# Patient Record
Sex: FEMALE | Race: WHITE | Employment: OTHER | URBAN - METROPOLITAN AREA
[De-identification: names, ages, dates, MRNs, and addresses within clinical notes are randomized per-mention and may not be internally consistent; named-entity substitution may affect disease eponyms.]

---

## 2018-05-15 ENCOUNTER — TELEPHONE (OUTPATIENT)
Dept: INTERNAL MEDICINE CLINIC | Age: 78
End: 2018-05-15

## 2018-05-15 ENCOUNTER — OFFICE VISIT (OUTPATIENT)
Dept: INTERNAL MEDICINE CLINIC | Age: 78
End: 2018-05-15

## 2018-05-15 VITALS
HEART RATE: 76 BPM | BODY MASS INDEX: 28.1 KG/M2 | WEIGHT: 158.6 LBS | RESPIRATION RATE: 12 BRPM | HEIGHT: 63 IN | DIASTOLIC BLOOD PRESSURE: 80 MMHG | TEMPERATURE: 97.3 F | SYSTOLIC BLOOD PRESSURE: 126 MMHG | OXYGEN SATURATION: 96 %

## 2018-05-15 DIAGNOSIS — S46.001S OSTEOARTHRITIS OF RIGHT SHOULDER DUE TO ROTATOR CUFF INJURY: ICD-10-CM

## 2018-05-15 DIAGNOSIS — M54.50 LUMBAR PAIN WITH RADIATION DOWN LEFT LEG: Primary | ICD-10-CM

## 2018-05-15 DIAGNOSIS — M79.605 LUMBAR PAIN WITH RADIATION DOWN LEFT LEG: Primary | ICD-10-CM

## 2018-05-15 DIAGNOSIS — E78.00 HYPERCHOLESTEROLEMIA: ICD-10-CM

## 2018-05-15 DIAGNOSIS — Z78.0 POSTMENOPAUSAL: ICD-10-CM

## 2018-05-15 DIAGNOSIS — M19.111 OSTEOARTHRITIS OF RIGHT SHOULDER DUE TO ROTATOR CUFF INJURY: ICD-10-CM

## 2018-05-15 DIAGNOSIS — F41.9 ANXIETY DISORDER, UNSPECIFIED TYPE: ICD-10-CM

## 2018-05-15 RX ORDER — DESONIDE 0.5 MG/ML
LOTION TOPICAL 2 TIMES DAILY
COMMUNITY

## 2018-05-15 RX ORDER — PREDNISONE 20 MG/1
20 TABLET ORAL
Qty: 7 TAB | Refills: 0 | Status: SHIPPED | OUTPATIENT
Start: 2018-05-15 | End: 2018-09-05

## 2018-05-15 NOTE — MR AVS SNAPSHOT
[address] [facility] 
 
 
 [address], [address] 200 [facility] 
[phone] Patient: Gorge Mendoza MRN: [id number] Visit Information Date & Time Provider Department Dept. Phone Encounter #  
 5/15/2018  8:30 AM Leta Carrera MD [facility] [phone] [id number] Follow-up Instructions Return in about 3 months (around 8/15/2018). Your Appointments 8/8/2018  9:55 AM  
LAB with [facility] NURSE VISIT [facility] ([facility]) Appt Note: lab  
 [address], [address] [address] [address] [place]  
  
   
 [address], [address]  
  
    
 8/15/2018  8:30 AM  
Office Visit with Leta Carrera MD  
[facility]) Appt Note: ov 3mos jeremias  
 [address] [facility], [address] [address] [address] [place]  
  
   
 [address], [address]  
  
    
  
 10/30/2018  1:00 PM  
Any with Chioma Lewis MD  
[facility] ([facility]) Appt Note: Return in about 6 months (around 10/27/2018) for follow up. [address] [facility] [address] [facility]  
  
   
 [address] [facility] [address] [address] Upcoming Health Maintenance Date Due DTaP/Tdap/Td series (1 - Tdap) 5/26/1962 ZOSTER VACCINE AGE 60> 3/26/2001 GLAUCOMA SCREENING Q2Y 5/26/2006 Bone Densitometry (Dexa) Screening 5/26/2006 Pneumococcal 65+ Low/Medium Risk (1 of 2 - PCV13) 5/26/2006 MEDICARE YEARLY EXAM 3/14/2018 Influenza Age 5 to Adult 8/1/2018 Allergies as of 5/15/2018  Review Complete On: 5/2/2018 By: Chioma Lewis MD  
  
 Severity Noted Reaction Type Reactions Hydrocodone Bitartrate High 07/17/2015    Nausea Only Ibuprofen High 07/17/2015    Other (comments) Platelets drop Iodinated Contrast- Oral And Iv Dye High 07/17/2015    Other (comments) Iodine  03/02/2018    Swelling Current Immunizations  Never Reviewed No immunizations on file. Not reviewed this visit You Were Diagnosed With   
  
 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 [phone] Your Updated Medication List  
  
   
This list is accurate as of 5/15/18 10:49 AM.  Always use your most recent med list.  
  
  
  
  
 ALIVE WOMEN'S GUMMY VITAMINS PO Take  by mouth.  
  
 busPIRone 10 mg tablet Commonly known as:  BUSPAR  
  
 celecoxib 200 mg capsule Commonly known as:  CELEBREX Take 200 mg by mouth two (2) times a day. clobetasol 0.05 % external solution Commonly known as:  TEMOVATE  
  
 cyclobenzaprine 5 mg tablet Commonly known as:  FLEXERIL  
CYCLOBENZAPRINE HCL 10MG, 1 (ONE) TABLET TABLET EVERY SIX HOURS, AS NEEDED # 90, 03/17/2015, REF. X3. ACTIVE  
  
 desonide 0.05 % topical lotion Commonly known as:  Dorthula Newcomer Apply  to affected area two (2) times a day. diclofenac 1 % Gel Commonly known as:  VOLTAREN FIBER SUPPLEMENT PO Take  by mouth.  
  
 gabapentin 300 mg capsule Commonly known as:  NEURONTIN Take 300 mg by mouth three (3) times daily. hydroCHLOROthiazide 25 mg tablet Commonly known as:  HYDRODIURIL Take 25 mg by mouth daily. ketoconazole 2 % shampoo Commonly known as:  NIZORAL  
  
 nystatin-triamcinolone 100,000-0.1 unit/gram-% ointment Commonly known as:  MYCOLOG  
nystatin-triamcinolone 100,000 unit/gram-0.1 % topical ointment  
  
 predniSONE 20 mg tablet Commonly known as:  Leverne Lawrence Take 1 Tab by mouth daily (with breakfast). raloxifene 60 mg tablet Commonly known as:  EVISTA  
  
 simvastatin 20 mg tablet Commonly known as:  ZOCOR  
TAKE 1 TABLET BY MOUTH EVERY NIGHT AT BEDTIME. tacrolimus 0.1 % ointment Commonly known as:  PROTOPIC  
tacrolimus 0.1 % topical ointment  
  
 venlafaxine-SR 75 mg capsule Commonly known as:  EFFEXOR-XR Take 75 mg by mouth daily. VITAMIN D2 50,000 unit capsule Generic drug:  ergocalciferol TAKE 1 CAPSULE BY MOUTH EVERY THURSDAY. Prescriptions Sent to Pharmacy Refills  
 predniSONE (DELTASONE) 20 mg tablet 0 Sig: Take 1 Tab by mouth daily (with breakfast). Class: Normal  
 Pharmacy: [facility] [address] [place] [address],[address], [address]  #: [phone] Route: Oral  
  
Follow-up Instructions Return in about 3 months (around 8/15/2018). Patient Instructions Advance Directives: Care Instructions Your Care Instructions An advance directive is a legal way to state your wishes at the end of your life. It tells your family and your doctor what to do if you can no longer say what you want. There are two main types of advance directives. You can change them any time that your wishes change. · A living will tells your family and your doctor your wishes about life support and other treatment. · A durable power of  for health care lets you name a person to make treatment decisions for you when you can't speak for yourself. This person is called a health care agent. If you do not have an advance directive, decisions about your medical care may be made by a doctor or a  who doesn't know you. It may help to think of an advance directive as a gift to the people who care for you. If you have one, they won't have to make tough decisions by themselves. Follow-up care is a key part of your treatment and safety. Be sure to make and go to all appointments, and call your doctor if you are having problems. It's also a good idea to know your test results and keep a list of the medicines you take. How can you care for yourself at home? · Discuss your wishes with your loved ones and your doctor. This way, there are no surprises. · Many states have a unique form. Or you might use a universal form that has been approved by many states. This kind of form can sometimes be completed and stored online. Your electronic copy will then be available wherever you have a connection to the Internet. In most cases, doctors will respect your wishes even if you have a form from a different state. · You don't need a  to do an advance directive. But you may want to get legal advice. · Think about these questions when you prepare an advance directive: ¨ Who do you want to make decisions about your medical care if you are not able to? Many people choose a family member or close friend. ¨ Do you know enough about life support methods that might be used? If not, talk to your doctor so you understand. ¨ What are you most afraid of that might happen? You might be afraid of having pain, losing your independence, or being kept alive by machines. ¨ Where would you prefer to die? Choices include your home, a hospital, or a nursing home. ¨ Would you like to have information about hospice care to support you and your family? ¨ Do you want to donate organs when you die? ¨ Do you want certain Taoism practices performed before you die? If so, put your wishes in the advance directive. · Read your advance directive every year, and make changes as needed. When should you call for help? Be sure to contact your doctor if you have any questions. Where can you learn more? Go to http://robbin-duglas.info/. Enter R264 in the search box to learn more about \"Advance Directives: Care Instructions. \" Current as of: September 24, 2016 Content Version: 11.4 © 2549-3128 [employer]. Care instructions adapted under license by [employer] (which disclaims liability or warranty for this information). If you have questions about a medical condition or this instruction, always ask your healthcare professional. [address] any warranty or liability for your use of this information. Introducing [facility] & HEALTH SERVICES! Dear Princess Matta: Thank you for requesting a Connolly account. Our records indicate that you already have an active Connolly account. You can access your account anytime at [email or web address] Did you know that you can access your hospital and ER discharge instructions at any time in Connolly? You can also review all of your test results from your hospital stay or ER visit. Additional Information If you have questions, please visit the Frequently Asked Questions section of the Connolly website at [email or web address] Remember, Connolly is NOT to be used for urgent needs. For medical emergencies, dial 911. Now available from your iPhone and Android! Please provide this summary of care documentation to your next provider. Your primary care clinician is listed as Khoa Sewell. If you have any questions after today's visit, please call [phone].

## 2018-05-15 NOTE — PROGRESS NOTES
1. Have you been to the ER, urgent care clinic or hospitalized since your last visit? NO.     2. Have you seen or consulted any other health care providers outside of the [address] [place] [address] since your last visit (Include any pap smears or colon screening)? YES  Dr Rogelio Nice ortho, Dr Hernández July [facility], spine doctor Dr Andrzej Mariscal, Dr Neo Sethi Neurology for spine issues, Dr Nikia Hernandez, she will be having a CT scan on 5/23 for feelings of fullness even though she has not ate anything, Dr Ileana Mathews Rheumatology     Do you have an Advanced Directive? NO    Would you like information on Advanced Directives?  Yes

## 2018-05-15 NOTE — PATIENT INSTRUCTIONS
Advance Directives: Care Instructions  Your Care Instructions  An advance directive is a legal way to state your wishes at the end of your life. It tells your family and your doctor what to do if you can no longer say what you want. There are two main types of advance directives. You can change them any time that your wishes change. · A living will tells your family and your doctor your wishes about life support and other treatment. · A durable power of  for health care lets you name a person to make treatment decisions for you when you can't speak for yourself. This person is called a health care agent. If you do not have an advance directive, decisions about your medical care may be made by a doctor or a  who doesn't know you. It may help to think of an advance directive as a gift to the people who care for you. If you have one, they won't have to make tough decisions by themselves. Follow-up care is a key part of your treatment and safety. Be sure to make and go to all appointments, and call your doctor if you are having problems. It's also a good idea to know your test results and keep a list of the medicines you take. How can you care for yourself at home? · Discuss your wishes with your loved ones and your doctor. This way, there are no surprises. · Many states have a unique form. Or you might use a universal form that has been approved by many states. This kind of form can sometimes be completed and stored online. Your electronic copy will then be available wherever you have a connection to the Internet. In most cases, doctors will respect your wishes even if you have a form from a different state. · You don't need a  to do an advance directive. But you may want to get legal advice. · Think about these questions when you prepare an advance directive:  ¨ Who do you want to make decisions about your medical care if you are not able to?  Many people choose a family member or close friend. ¨ Do you know enough about life support methods that might be used? If not, talk to your doctor so you understand. ¨ What are you most afraid of that might happen? You might be afraid of having pain, losing your independence, or being kept alive by machines. ¨ Where would you prefer to die? Choices include your home, a hospital, or a nursing home. ¨ Would you like to have information about hospice care to support you and your family? ¨ Do you want to donate organs when you die? ¨ Do you want certain Taoism practices performed before you die? If so, put your wishes in the advance directive. · Read your advance directive every year, and make changes as needed. When should you call for help? Be sure to contact your doctor if you have any questions. Where can you learn more? Go to http://robbin-duglas.info/. Enter R264 in the search box to learn more about \"Advance Directives: Care Instructions. \"  Current as of: September 24, 2016  Content Version: 11.4  © 3002-1455 [employer]wise[employer]orporated. Care instructions adapted under license by [employer] (which disclaims liability or warranty for this information). If you have questions about a medical condition or this instruction, always ask your healthcare professional. [address] any warranty or liability for your use of this information.

## 2018-05-16 PROBLEM — N32.81 OVERACTIVE BLADDER: Status: ACTIVE | Noted: 2018-05-16

## 2018-05-16 PROBLEM — Z87.440 HISTORY OF RECURRENT UTIS: Status: ACTIVE | Noted: 2018-05-16

## 2018-05-16 NOTE — PROGRESS NOTES
JUSTEN Allison is a 69 y.o. female with relevant past medical history of chronic pain, arthritis, hyperlipidemia, HTN, recurrent UTIs, OAB, who presents today to establish medical care. Patient's PCP is retiring. Overall the patient feels well, except for chronic low back pain and R shoulder pain secondary to rotator cuff injury. Patient is following with ortho and is undergoing work up. She has had corticosteroid injection to her R shoulder, once with great control of her pain, but a second injection did not have the same response. She is not interested much in doing PT. She is right handed. Denies any h/o trauma or surgeries. She tells me her back pain radiates to her left lower extremity, with shooting pain, severe. Denies any motor or sensory deficits. Has an MRI pending for evaluation of this pain. Urology notes from 3/2/18 noted. OAB, life style changes recommended before medication trial. Recurrent UTIs (3 in the past year), Caesar Ek on UC from 12/2017. UTI prevention discussed, cranberry recommended. ROS  As above included in HPI. Otherwise 11 point review of systems negative including constitutional, skin, HENT, eyes, respiratory, cardiovascular, gastrointestinal, genitourinary, musculoskeletal, endocrine, hematologic, allergy, and neurologic. Past Medical History  Past Medical History:   Diagnosis Date    Arthritis     Breast tumor     Chronic pain     Hypercholesterolemia      History reviewed. No pertinent surgical history. Family History  Family History   Problem Relation Age of Onset    Hypertension Mother     Diabetes Mother     Stroke Father     Heart Disease Brother        Social History  She  reports that she has never smoked. She has never used smokeless tobacco.   History   Alcohol Use    Yes     Comment: socially       Immunization History    There is no immunization history on file for this patient.     Allergies  Allergies   Allergen Reactions    Hydrocodone Bitartrate Nausea Only    Ibuprofen Other (comments)     Platelets drop     Iodinated Contrast- Oral And Iv Dye Other (comments)    Iodine Swelling       Medications  Current Outpatient Prescriptions   Medication Sig    desonide (TRIDESILON) 0.05 % topical lotion Apply  to affected area two (2) times a day.  psyllium seed, with sugar, (FIBER SUPPLEMENT PO) Take  by mouth.  multivit-min/folic [id number] (ALIVE WOMEN'S GUMMY VITAMINS PO) Take  by mouth.  predniSONE (DELTASONE) 20 mg tablet Take 1 Tab by mouth daily (with breakfast).  clobetasol (TEMOVATE) 0.05 % external solution     cyclobenzaprine (FLEXERIL) 5 mg tablet CYCLOBENZAPRINE HCL 10MG, 1 (ONE) TABLET TABLET EVERY SIX HOURS, AS NEEDED # 90, 03/17/2015, REF. X3. ACTIVE    diclofenac (VOLTAREN) 1 % gel     nystatin-triamcinolone (MYCOLOG) 100,000-0.1 unit/gram-% ointment nystatin-triamcinolone 100,000 unit/gram-0.1 % topical ointment    busPIRone (BUSPAR) 10 mg tablet     celecoxib (CELEBREX) 200 mg capsule Take 200 mg by mouth two (2) times a day.  VITAMIN D2 50,000 unit capsule TAKE 1 CAPSULE BY MOUTH EVERY THURSDAY.  gabapentin (NEURONTIN) 300 mg capsule Take 300 mg by mouth three (3) times daily.  hydroCHLOROthiazide (HYDRODIURIL) 25 mg tablet Take 25 mg by mouth daily.  ketoconazole (NIZORAL) 2 % shampoo     raloxifene (EVISTA) 60 mg tablet     simvastatin (ZOCOR) 20 mg tablet TAKE 1 TABLET BY MOUTH EVERY NIGHT AT BEDTIME.  venlafaxine-SR (EFFEXOR-XR) 75 mg capsule Take 75 mg by mouth daily.  tacrolimus (PROTOPIC) 0.1 % ointment tacrolimus 0.1 % topical ointment     No current facility-administered medications for this visit. Visit Vitals    /80 (BP 1 Location: Left arm, BP Patient Position: Sitting)    Pulse 76    Temp 97.3 °F (36.3 °C) (Oral)    Resp 12    Ht 5' 3\" (1.6 m)    Wt 158 lb 9.6 oz (71.9 kg)    SpO2 96%    BMI 28.09 kg/m2     Body mass index is 28.09 kg/(m^2).     Physical Exam Constitutional: She is oriented to person, place, and time and well-developed, well-nourished, and in no distress. HENT:   Head: Normocephalic and atraumatic. Right Ear: External ear normal.   Left Ear: External ear normal.   Nose: Nose normal.   Mouth/Throat: Oropharynx is clear and moist.   Eyes: EOM are normal. Pupils are equal, round, and reactive to light. Neck: Normal range of motion. Neck supple. Cardiovascular: Normal rate, regular rhythm, normal heart sounds and intact distal pulses. Pulmonary/Chest: Effort normal and breath sounds normal. No respiratory distress. Abdominal: Soft. Bowel sounds are normal.   Musculoskeletal: Normal range of motion. She exhibits no edema. Right shoulder pain with internal rotation and abduction. Full ROM. Neurological: She is alert and oriented to person, place, and time. Skin: Skin is warm. No rash noted. No erythema. No pallor. Psychiatric: Mood and affect normal.   Nursing note and vitals reviewed.         REVIEW OF DATA    Labs  Office Visit on 04/27/2018   Component Date Value Ref Range Status    Color (UA POC) 04/27/2018 Yellow   Final    Clarity (UA POC) 04/27/2018 Clear   Final    Glucose (UA POC) 04/27/2018 Negative  Negative Final    Bilirubin (UA POC) 04/27/2018 Negative  Negative Final    Ketones (UA POC) 04/27/2018 Negative  Negative Final    Specific gravity (UA POC) 04/27/2018 1.010  1.001 - 1.035 Final    Blood (UA POC) 04/27/2018 Trace  Negative Final    pH (UA POC) 04/27/2018 7.0  4.6 - 8.0 Final    Protein (UA POC) 04/27/2018 Negative  Negative Final    Urobilinogen (UA POC) 04/27/2018 0.2 mg/dL  0.2 - 1 Final    Nitrites (UA POC) 04/27/2018 Negative  Negative Final    Leukocyte esterase (UA POC) 04/27/2018 Negative  Negative Final         CT Results (most recent):    Results from Hospital Encounter encounter on 11/30/15   CT CHEST WO CONT   Narrative EXAMINATION: CT chest without contrast.    HISTORY:  Thoracic spine pain. Right chest and rib pain 9-10 weeks. No history  of trauma. TECHNIQUE:  Multiple contiguous axial CT images at 5 mm increments were obtained from the  apex of the lungs to the  domes of the diaphragm without intravenous contrast.    FINDINGS:    Minimal bibasilar discoid atelectasis versus scarring. No lung masses. Pretracheal lymph node 12 x 18 mm. Several smaller right paratracheal lymph  nodes. Small aortic pulmonic lymph nodes. No gross hilar lymph node enlargement. Mild degenerative spurring minimal thoracic levels. No compression deformities. No blastic or lytic lesions in the thoracic spine. No discrete rib lesions  identified. No evidence of healing rib fracture. IMPRESSION:  1. Nonspecific prominent pretracheal lymph nodes likely reactive. Recommend  followup in 6 months with intravenous contrast to ensure stability. 2.  Mild degenerative changes of the thoracic spine without evidence of acute  compression fracture or lesion. 3.  No discrete rib lesions identified. ST/acw    Electronically signed by: Vivek Jacques Date:  11/30/2015 21:11         XR Results (most recent):    Results from [place] encounter on 02/16/16   XR RIBS UNILATERAL   Narrative HISTORY: Right rib pain. IMPRESSION: Three views of the right ribs reveal no rib fracture, hemothorax, or  pneumothorax. VDL/pab    Electronically signed by: Le Lockwood Date:  02/16/2016 14:43       CT   All Micro Results     None             HCM  I will obtain records from previous PCP Dr. Joce Hernandez. Per patient up to date. DIAGNOSIS AND PLAN  Patient Active Problem List   Diagnosis Code    Breast tumor D49.3    Chronic pain G89.29    Hypercholesterolemia E78.00    Arthritis M19.90    History of recurrent UTIs Z87.440    Overactive bladder N32.81     1. Lumbar pain with radiation down left leg  - Pending MRI for evaluation.   - Following with ortho.   - Pain responds to gabapentin partiallly and celecoxib  - Trial of prednisone for radiculopathic symptoms while awaiting assessment and management recommendations by ortho after imaging study. 2. 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  - Following with ortho. - May benefit from intra-articular corticosteroid injections (done in the past with variable response) and PT. 3. Hypercholesterolemia/HTN  - Controlled with simvastatin. Patient had lipid panel done by prior PCP ([address]) 4/2018.   - Controlled with HCTZ, patient reports use for diuresis of LE edema occasionally. 4. Anxiety disorder, postmenopausal status  Has been taking venlafaxine for hot flashes and buspirone for anxiety, prescribed by GYN, recently seen by them. Up to date with pap smear and mammograms (records requested). Follow-up Disposition:  Return in about 3 months (around 8/15/2018). Elen Macario Mt, MD

## 2018-07-16 ENCOUNTER — OFFICE VISIT (OUTPATIENT)
Dept: INTERNAL MEDICINE CLINIC | Age: 78
End: 2018-07-16

## 2018-07-16 VITALS
HEART RATE: 95 BPM | DIASTOLIC BLOOD PRESSURE: 60 MMHG | HEIGHT: 63 IN | TEMPERATURE: 98.4 F | BODY MASS INDEX: 28.53 KG/M2 | SYSTOLIC BLOOD PRESSURE: 104 MMHG | OXYGEN SATURATION: 98 % | RESPIRATION RATE: 14 BRPM | WEIGHT: 161 LBS

## 2018-07-16 DIAGNOSIS — M79.605 LUMBAR PAIN WITH RADIATION DOWN LEFT LEG: ICD-10-CM

## 2018-07-16 DIAGNOSIS — Z01.818 PREOPERATIVE CLEARANCE: Primary | ICD-10-CM

## 2018-07-16 DIAGNOSIS — S46.001S OSTEOARTHRITIS OF RIGHT SHOULDER DUE TO ROTATOR CUFF INJURY: ICD-10-CM

## 2018-07-16 DIAGNOSIS — M19.111 OSTEOARTHRITIS OF RIGHT SHOULDER DUE TO ROTATOR CUFF INJURY: ICD-10-CM

## 2018-07-16 DIAGNOSIS — M54.50 LUMBAR PAIN WITH RADIATION DOWN LEFT LEG: ICD-10-CM

## 2018-07-16 RX ORDER — DESONIDE 0.5 MG/ML
LOTION TOPICAL 2 TIMES DAILY
Qty: 59 ML | Status: CANCELLED | OUTPATIENT
Start: 2018-07-16

## 2018-07-16 RX ORDER — CYCLOBENZAPRINE HCL 5 MG
TABLET ORAL
Qty: 60 TAB | Refills: 2 | Status: SHIPPED | OUTPATIENT
Start: 2018-07-16 | End: 2018-08-15 | Stop reason: SDUPTHER

## 2018-07-16 RX ORDER — DICLOFENAC SODIUM 10 MG/G
GEL TOPICAL 4 TIMES DAILY
Qty: 100 G | Refills: 1 | Status: SHIPPED | OUTPATIENT
Start: 2018-07-16

## 2018-07-16 NOTE — PROGRESS NOTES
Chief Complaint   Patient presents with    Pre-op Exam     Patient present for a medical clearance for right shoulder surgery to be performed by Dr. Sven Orozco on July 20, 2018 at [facility]. Patient states that her EKG and blood work was completed before this visit. Health Maintenance Due   Topic Date Due    DTaP/Tdap/Td series (1 - Tdap) 05/26/1962    ZOSTER VACCINE AGE 60>  03/26/2001    GLAUCOMA SCREENING Q2Y  05/26/2006    Bone Densitometry (Dexa) Screening  05/26/2006    Pneumococcal 65+ Low/Medium Risk (1 of 2 - PCV13) 05/26/2006    MEDICARE YEARLY EXAM  03/14/2018     1. Have you been to the ER, urgent care clinic or hospitalized since your last visit? NO.     2. Have you seen or consulted any other health care providers outside of the [address] [place] [address] since your last visit (Include any pap smears or colon screening)? YES, Last seen on July 2, 2018 with Dr. Irving Lake from [facility]. Do you have an Advanced Directive? YES, Patient states she has it somewhere in her home, but her  knows what she wants.

## 2018-07-16 NOTE — PROGRESS NOTES
HPI     Yarelis Gutierres is a 69 y.o. female with relevant past medical history of chronic pain, arthritis, hyperlipidemia, HTN, recurrent UTIs, OAB, who presents today to preop clearance for R shoulder rotator cuff repair, by Dr. Adonay Ferrer on 7/20/18 with general anesthesia. Overall the patient feels well, except for chronic low back pain and R shoulder pain secondary to rotator cuff injury. Denies any h/o trauma or surgeries. Reports shoulder pain is moderate to severe, worse with movement. Patient denies any chest pain, palpitations, leg swelling, dizziness, headaches, nausea, vomiting, diarrhea, abdominal pain, urinary symptoms including dysuria, urinary frequency, or hematuria. ROS  As above included in HPI. Otherwise 11 point review of systems negative including constitutional, skin, HENT, eyes, respiratory, cardiovascular, gastrointestinal, genitourinary, musculoskeletal, endocrine, hematologic, allergy, and neurologic. Past Medical History  Past Medical History:   Diagnosis Date    Arthritis     Breast tumor     Chronic pain     Hypercholesterolemia      History reviewed. No pertinent surgical history. Family History  Family History   Problem Relation Age of Onset    Hypertension Mother     Diabetes Mother     Stroke Father     Heart Disease Brother        Social History  She  reports that she has quit smoking. She has never used smokeless tobacco.   History   Alcohol Use    0.6 oz/week    1 Glasses of wine per week     Comment: socially       Immunization History    There is no immunization history on file for this patient.     Allergies  Allergies   Allergen Reactions    Hydrocodone Bitartrate Nausea Only    Ibuprofen Other (comments)     Platelets drop     Iodinated Contrast- Oral And Iv Dye Other (comments)    Iodine Swelling       Medications  Current Outpatient Prescriptions   Medication Sig    desonide (TRIDESILON) 0.05 % topical lotion Apply  to affected area two (2) times a day.    psyllium seed, with sugar, (FIBER SUPPLEMENT PO) Take  by mouth.  clobetasol (TEMOVATE) 0.05 % external solution     diclofenac (VOLTAREN) 1 % gel     nystatin-triamcinolone (MYCOLOG) 100,000-0.1 unit/gram-% ointment nystatin-triamcinolone 100,000 unit/gram-0.1 % topical ointment    busPIRone (BUSPAR) 10 mg tablet     celecoxib (CELEBREX) 200 mg capsule Take 200 mg by mouth two (2) times a day.  VITAMIN D2 50,000 unit capsule TAKE 1 CAPSULE BY MOUTH EVERY THURSDAY.  gabapentin (NEURONTIN) 300 mg capsule Take 300 mg by mouth four (4) times daily.  hydroCHLOROthiazide (HYDRODIURIL) 25 mg tablet Take 25 mg by mouth daily.  ketoconazole (NIZORAL) 2 % shampoo     raloxifene (EVISTA) 60 mg tablet     simvastatin (ZOCOR) 20 mg tablet TAKE 1 TABLET BY MOUTH EVERY NIGHT AT BEDTIME.  predniSONE (DELTASONE) 20 mg tablet Take 1 Tab by mouth daily (with breakfast).  cyclobenzaprine (FLEXERIL) 5 mg tablet CYCLOBENZAPRINE HCL 10MG, 1 (ONE) TABLET TABLET EVERY SIX HOURS, AS NEEDED # 90, 03/17/2015, REF. X3. ACTIVE    tacrolimus (PROTOPIC) 0.1 % ointment tacrolimus 0.1 % topical ointment    venlafaxine-SR (EFFEXOR-XR) 75 mg capsule Take 75 mg by mouth daily. No current facility-administered medications for this visit. Visit Vitals    /60 (BP 1 Location: Left arm, BP Patient Position: Sitting)    Pulse 95    Temp 98.4 °F (36.9 °C) (Oral)    Resp 14    Ht 5' 3\" (1.6 m)    Wt 161 lb (73 kg)    SpO2 98%    BMI 28.52 kg/m2     Body mass index is 28.52 kg/(m^2). Physical Exam   Constitutional: She is oriented to person, place, and time and well-developed, well-nourished, and in no distress. HENT:   Head: Normocephalic and atraumatic. Right Ear: External ear normal.   Left Ear: External ear normal.   Nose: Nose normal.   Mouth/Throat: Oropharynx is clear and moist.   Eyes: EOM are normal. Pupils are equal, round, and reactive to light. Neck: Normal range of motion. Neck supple. Cardiovascular: Normal rate, regular rhythm, normal heart sounds and intact distal pulses. Pulmonary/Chest: Effort normal and breath sounds normal. No respiratory distress. Abdominal: Soft. Bowel sounds are normal.   Musculoskeletal: Normal range of motion. She exhibits no edema. Right shoulder pain with internal rotation and abduction. Full ROM. Neurological: She is alert and oriented to person, place, and time. Skin: Skin is warm. No rash noted. No erythema. No pallor. Psychiatric: Mood and affect normal.   Nursing note and vitals reviewed. REVIEW OF DATA    Labs  No visits with results within 1 Month(s) from this visit. Latest known visit with results is:    Office Visit on 04/27/2018   Component Date Value Ref Range Status    Color (UA POC) 04/27/2018 Yellow   Final    Clarity (UA POC) 04/27/2018 Clear   Final    Glucose (UA POC) 04/27/2018 Negative  Negative Final    Bilirubin (UA POC) 04/27/2018 Negative  Negative Final    Ketones (UA POC) 04/27/2018 Negative  Negative Final    Specific gravity (UA POC) 04/27/2018 1.010  1.001 - 1.035 Final    Blood (UA POC) 04/27/2018 Trace  Negative Final    pH (UA POC) 04/27/2018 7.0  4.6 - 8.0 Final    Protein (UA POC) 04/27/2018 Negative  Negative Final    Urobilinogen (UA POC) 04/27/2018 0.2 mg/dL  0.2 - 1 Final    Nitrites (UA POC) 04/27/2018 Negative  Negative Final    Leukocyte esterase (UA POC) 04/27/2018 Negative  Negative Final         CT Results (most recent):    Results from Hospital Encounter encounter on 11/30/15   CT CHEST WO CONT   Narrative EXAMINATION: CT chest without contrast.    HISTORY:  Thoracic spine pain. Right chest and rib pain 9-10 weeks. No history  of trauma.     TECHNIQUE:  Multiple contiguous axial CT images at 5 mm increments were obtained from the  apex of the lungs to the  domes of the diaphragm without intravenous contrast.    FINDINGS:    Minimal bibasilar discoid atelectasis versus scarring. No lung masses. Pretracheal lymph node 12 x 18 mm. Several smaller right paratracheal lymph  nodes. Small aortic pulmonic lymph nodes. No gross hilar lymph node enlargement. Mild degenerative spurring minimal thoracic levels. No compression deformities. No blastic or lytic lesions in the thoracic spine. No discrete rib lesions  identified. No evidence of healing rib fracture. IMPRESSION:  1. Nonspecific prominent pretracheal lymph nodes likely reactive. Recommend  followup in 6 months with intravenous contrast to ensure stability. 2.  Mild degenerative changes of the thoracic spine without evidence of acute  compression fracture or lesion. 3.  No discrete rib lesions identified. ST/acw    Electronically signed by: Audrey Lyon Date:  11/30/2015 21:11         XR Results (most recent):    Results from [place] encounter on 02/16/16   XR RIBS UNILATERAL   Narrative HISTORY: Right rib pain. IMPRESSION: Three views of the right ribs reveal no rib fracture, hemothorax, or  pneumothorax. VDL/pab    Electronically signed by: Kyree Weaver Date:  02/16/2016 14:43       CT   All Micro Results     None             HCM  I will obtain records from previous PCP Dr. Lion Glasgow. Per patient up to date. DIAGNOSIS AND PLAN  Patient Active Problem List   Diagnosis Code    Breast tumor D49.3    Chronic pain G89.29    Hypercholesterolemia E78.00    Arthritis M19.90    History of recurrent UTIs Z87.440    Overactive bladder N32.81     1. Lumbar pain with radiation down left leg  - Following with ortho. - Pain responds to gabapentin partiallly and celecoxib  - Trial of prednisone for radiculopathic symptoms  Helped according to patient    2. 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  Surgical Preoperative Clearance  -Patient here for preop clearance, according to patient planning to do a rotator cuff surgical repair, by Dr. Sunitha Lucas on July 20, 2018, with general anesthesia moderate risk procedure, and a low risk patient, hemodynamically stable and with no symptoms or signs of cardiovascular decompensation at this time. Labs and EKG done on July 12, 2018 reviewed, EKG unremarkable, CBC, basic metabolic panel and coagulation factors all within normal range. The patient cleared for the planned right shoulder rotator cuff repair. I will try to obtain records from most recent surgical evaluation including surgical plan, and follow-up with patient after procedure. Follow-up Disposition: Not on File     Elen Pendleton MD

## 2018-07-16 NOTE — Clinical Note
Could you please fax my notes along with the patient's labs and EKG for preop clearance with Dr. Daniel Marcus, ortho, [address] and request from them their last notes on the patient with their surgical plan.  Thanks

## 2018-07-16 NOTE — MR AVS SNAPSHOT
Nahum Ego 
 
 
 [address], [facility] [address] [facility] 
[phone] Patient: Alin Grant MRN: [id number] DPE:[id number]/[id number] Visit Information Date & Time Provider Department Dept. Phone Encounter #  
 7/16/2018  2:30 PM Donna Fleming MD [facility] [address] [place] [address] [phone] [id number] Your Appointments 8/8/2018  9:55 AM  
LAB with IOC NURSE VISIT [facility] [address] [place] [address] ([address]) Appt Note: lab  
 [address], [address] [place] [address] [place]  
  
   
 [address], [address]  
  
    
 8/15/2018 11:30 AM  
Office Visit with Donna Fleming MD  
[facility] [address] [place] [address] [address] [place] [address]) Appt Note: ov 3mos jeremias; ov 3mos jeremias  
 [address] [facility] [address] [address] [place]  
  
   
 [address], [address]  
  
    
  
 10/30/2018  1:00 PM  
Any with Cathleen Siddiqi MD  
[facility] ([address] [place] [address]) Appt Note: Return in about 6 months (around 10/27/2018) for follow up. [address] [facility] [address] [address] [address]  
  
   
 [address] [facility] 2301 [facility] 100 200 [facility] Upcoming Health Maintenance Date Due DTaP/Tdap/Td series (1 - Tdap) 5/26/1962 ZOSTER VACCINE AGE 60> 3/26/2001 GLAUCOMA SCREENING Q2Y 5/26/2006 Bone Densitometry (Dexa) Screening 5/26/2006 Pneumococcal 65+ Low/Medium Risk (1 of 2 - PCV13) 5/26/2006 MEDICARE YEARLY EXAM 3/14/2018 Influenza Age 5 to Adult 8/1/2018 Allergies as of 7/16/2018  Review Complete On: 7/16/2018 By: Kerry Jerez Severity Noted Reaction Type Reactions Hydrocodone Bitartrate High 07/17/2015    Nausea Only Ibuprofen High 07/17/2015    Other (comments) Platelets drop Iodinated Contrast- Oral And Iv Dye High 07/17/2015    Other (comments) Iodine  03/02/2018    Swelling Current Immunizations  Never Reviewed No immunizations on file. Not reviewed this visit You Were Diagnosed With   
  
 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 [phone] Your Updated Medication List  
  
   
This list is accurate as of 7/16/18  3:37 PM.  Always use your most recent med list.  
  
  
  
  
 busPIRone 10 mg tablet Commonly known as:  BUSPAR  
  
 celecoxib 200 mg capsule Commonly known as:  CELEBREX Take 200 mg by mouth two (2) times a day. clobetasol 0.05 % external solution Commonly known as:  TEMOVATE  
  
 cyclobenzaprine 5 mg tablet Commonly known as:  FLEXERIL  
CYCLOBENZAPRINE HCL 10MG, 1 (ONE) TABLET TABLET EVERY SIX HOURS, AS NEEDED # 90, 03/17/2015, REF. X3. ACTIVE  
  
 desonide 0.05 % topical lotion Commonly known as:  Neita Sieve Apply  to affected area two (2) times a day. diclofenac 1 % Gel Commonly known as:  VOLTAREN FIBER SUPPLEMENT PO Take  by mouth.  
  
 gabapentin 300 mg capsule Commonly known as:  NEURONTIN Take 300 mg by mouth four (4) times daily. hydroCHLOROthiazide 25 mg tablet Commonly known as:  HYDRODIURIL  
 Take 25 mg by mouth daily. ketoconazole 2 % shampoo Commonly known as:  NIZORAL  
  
 nystatin-triamcinolone 100,000-0.1 unit/gram-% ointment Commonly known as:  MYCOLOG  
nystatin-triamcinolone 100,000 unit/gram-0.1 % topical ointment  
  
 predniSONE 20 mg tablet Commonly known as:  Nanine Knife Take 1 Tab by mouth daily (with breakfast). raloxifene 60 mg tablet Commonly known as:  EVISTA  
  
 simvastatin 20 mg tablet Commonly known as:  ZOCOR  
TAKE 1 TABLET BY MOUTH EVERY NIGHT AT BEDTIME. tacrolimus 0.1 % ointment Commonly known as:  PROTOPIC  
tacrolimus 0.1 % topical ointment  
  
 venlafaxine-SR 75 mg capsule Commonly known as:  EFFEXOR-XR Take 75 mg by mouth daily. VITAMIN D2 50,000 unit capsule Generic drug:  ergocalciferol TAKE 1 CAPSULE BY MOUTH EVERY THURSDAY. Introducing [facility]! Dear Jayne Lal: Thank you for requesting a at[employer] account. Our records indicate that you already have an active atVenu account. You can access your account anytime at [email or web address] Did you know that you can access your hospital and ER discharge instructions at any time in atVenu? You can also review all of your test results from your hospital stay or ER visit. Additional Information If you have questions, please visit the Frequently Asked Questions section of the atVenu website at [email or web address] Remember, atVenu is NOT to be used for urgent needs. For medical emergencies, dial 911. Now available from your iPhone and Android! Please provide this summary of care documentation to your next provider. Your primary care clinician is listed as Rocio Ling. If you have any questions after today's visit, please call [phone].

## 2018-07-17 ENCOUNTER — DOCUMENTATION ONLY (OUTPATIENT)
Dept: INTERNAL MEDICINE CLINIC | Age: 78
End: 2018-07-17

## 2018-07-17 ENCOUNTER — TELEPHONE (OUTPATIENT)
Dept: INTERNAL MEDICINE CLINIC | Age: 78
End: 2018-07-17

## 2018-07-17 NOTE — PROGRESS NOTES
Med clearance note, labs & ekg results (printed out by Dr Pietro Oreilly) were faxed to Dr Merlinda Hastings at [facility] at [phone].

## 2018-07-30 RX ORDER — SIMVASTATIN 20 MG/1
TABLET, FILM COATED ORAL
Qty: 90 TAB | Refills: 3 | Status: SHIPPED | OUTPATIENT
Start: 2018-07-30 | End: 2019-04-29 | Stop reason: SDUPTHER

## 2018-07-30 NOTE — TELEPHONE ENCOUNTER
Pt stated she was getting rx zocor from Dr. Dori Doherty, she is no longer being seen by her and req a refill on it,pls [facility] to [facility] on [facility] CHERELLE

## 2018-08-15 ENCOUNTER — HOSPITAL ENCOUNTER (OUTPATIENT)
Dept: LAB | Age: 78
Discharge: HOME | End: 2018-08-15
Payer: MEDICARE

## 2018-08-15 ENCOUNTER — OFFICE VISIT (OUTPATIENT)
Dept: INTERNAL MEDICINE CLINIC | Age: 78
End: 2018-08-15

## 2018-08-15 VITALS
RESPIRATION RATE: 18 BRPM | HEART RATE: 87 BPM | BODY MASS INDEX: 28.53 KG/M2 | SYSTOLIC BLOOD PRESSURE: 120 MMHG | TEMPERATURE: 97.6 F | HEIGHT: 63 IN | WEIGHT: 161 LBS | OXYGEN SATURATION: 99 % | DIASTOLIC BLOOD PRESSURE: 60 MMHG

## 2018-08-15 DIAGNOSIS — F41.9 ANXIETY DISORDER, UNSPECIFIED TYPE: ICD-10-CM

## 2018-08-15 DIAGNOSIS — M19.90 OSTEOARTHRITIS, UNSPECIFIED OSTEOARTHRITIS TYPE, UNSPECIFIED SITE: ICD-10-CM

## 2018-08-15 DIAGNOSIS — R20.0 NUMBNESS AND TINGLING: ICD-10-CM

## 2018-08-15 DIAGNOSIS — E55.9 VITAMIN D DEFICIENCY: ICD-10-CM

## 2018-08-15 DIAGNOSIS — E78.5 HYPERLIPIDEMIA, UNSPECIFIED HYPERLIPIDEMIA TYPE: ICD-10-CM

## 2018-08-15 DIAGNOSIS — R20.2 NUMBNESS AND TINGLING: ICD-10-CM

## 2018-08-15 DIAGNOSIS — R73.03 PREDIABETES: ICD-10-CM

## 2018-08-15 DIAGNOSIS — M85.80 OSTEOPENIA, UNSPECIFIED LOCATION: ICD-10-CM

## 2018-08-15 DIAGNOSIS — M67.911 ROTATOR CUFF DISORDER, RIGHT: Primary | ICD-10-CM

## 2018-08-15 LAB
ALBUMIN SERPL-MCNC: 3.7 G/DL (ref 3.4–5)
ALBUMIN/GLOB SERPL: 1.1 {RATIO} (ref 0.8–1.7)
ALP SERPL-CCNC: 62 U/L (ref 45–117)
ALT SERPL-CCNC: 31 U/L (ref 13–56)
ANION GAP SERPL CALC-SCNC: 9 MMOL/L (ref 3–18)
AST SERPL-CCNC: 21 U/L (ref 15–37)
BASOPHILS # BLD: 0 K/UL (ref 0–0.1)
BASOPHILS NFR BLD: 0 % (ref 0–2)
BILIRUB SERPL-MCNC: 0.2 MG/DL (ref 0.2–1)
BUN SERPL-MCNC: 16 MG/DL (ref 7–18)
BUN/CREAT SERPL: 24 (ref 12–20)
CALCIUM SERPL-MCNC: 9.2 MG/DL (ref 8.5–10.1)
CHLORIDE SERPL-SCNC: 94 MMOL/L (ref 100–108)
CHOLEST SERPL-MCNC: 130 MG/DL
CO2 SERPL-SCNC: 29 MMOL/L (ref 21–32)
CREAT SERPL-MCNC: 0.68 MG/DL (ref 0.6–1.3)
DIFFERENTIAL METHOD BLD: ABNORMAL
EOSINOPHIL # BLD: 0.2 K/UL (ref 0–0.4)
EOSINOPHIL NFR BLD: 3 % (ref 0–5)
ERYTHROCYTE [DISTWIDTH] IN BLOOD BY AUTOMATED COUNT: 13.3 % (ref 11.6–14.5)
GLOBULIN SER CALC-MCNC: 3.4 G/DL (ref 2–4)
GLUCOSE SERPL-MCNC: 90 MG/DL (ref 74–99)
HBA1C MFR BLD: 5.8 % (ref 4.2–5.6)
HCT VFR BLD AUTO: 37 % (ref 35–45)
HDLC SERPL-MCNC: 54 MG/DL (ref 40–60)
HDLC SERPL: 2.4 {RATIO} (ref 0–5)
HGB BLD-MCNC: 12.5 G/DL (ref 12–16)
LDLC SERPL CALC-MCNC: 47.8 MG/DL (ref 0–100)
LIPID PROFILE,FLP: NORMAL
LYMPHOCYTES # BLD: 1.6 K/UL (ref 0.9–3.6)
LYMPHOCYTES NFR BLD: 26 % (ref 21–52)
MCH RBC QN AUTO: 30.6 PG (ref 24–34)
MCHC RBC AUTO-ENTMCNC: 33.8 G/DL (ref 31–37)
MCV RBC AUTO: 90.7 FL (ref 74–97)
MONOCYTES # BLD: 0.8 K/UL (ref 0.05–1.2)
MONOCYTES NFR BLD: 13 % (ref 3–10)
NEUTS SEG # BLD: 3.5 K/UL (ref 1.8–8)
NEUTS SEG NFR BLD: 58 % (ref 40–73)
PLATELET # BLD AUTO: 269 K/UL (ref 135–420)
PMV BLD AUTO: 10 FL (ref 9.2–11.8)
POTASSIUM SERPL-SCNC: 3.6 MMOL/L (ref 3.5–5.5)
PROT SERPL-MCNC: 7.1 G/DL (ref 6.4–8.2)
RBC # BLD AUTO: 4.08 M/UL (ref 4.2–5.3)
SODIUM SERPL-SCNC: 132 MMOL/L (ref 136–145)
T4 FREE SERPL-MCNC: 1 NG/DL (ref 0.7–1.5)
TRIGL SERPL-MCNC: 141 MG/DL (ref ?–150)
TSH SERPL DL<=0.05 MIU/L-ACNC: 1.9 UIU/ML (ref 0.36–3.74)
VLDLC SERPL CALC-MCNC: 28.2 MG/DL
WBC # BLD AUTO: 6.1 K/UL (ref 4.6–13.2)

## 2018-08-15 PROCEDURE — 80061 LIPID PANEL: CPT | Performed by: INTERNAL MEDICINE

## 2018-08-15 PROCEDURE — 80053 COMPREHEN METABOLIC PANEL: CPT | Performed by: INTERNAL MEDICINE

## 2018-08-15 PROCEDURE — 85025 COMPLETE CBC W/AUTO DIFF WBC: CPT | Performed by: INTERNAL MEDICINE

## 2018-08-15 PROCEDURE — 82306 VITAMIN D 25 HYDROXY: CPT | Performed by: INTERNAL MEDICINE

## 2018-08-15 PROCEDURE — 84439 ASSAY OF FREE THYROXINE: CPT | Performed by: INTERNAL MEDICINE

## 2018-08-15 PROCEDURE — 83036 HEMOGLOBIN GLYCOSYLATED A1C: CPT | Performed by: INTERNAL MEDICINE

## 2018-08-15 RX ORDER — DOCUSATE SODIUM 100 MG/1
CAPSULE, LIQUID FILLED ORAL
Refills: 0 | COMMUNITY
Start: 2018-07-20 | End: 2019-08-28 | Stop reason: ALTCHOICE

## 2018-08-15 RX ORDER — CYCLOBENZAPRINE HCL 10 MG
TABLET ORAL
Qty: 90 TAB | Refills: 2 | Status: SHIPPED | OUTPATIENT
Start: 2018-08-15 | End: 2019-07-26 | Stop reason: SDUPTHER

## 2018-08-15 RX ORDER — BUSPIRONE HYDROCHLORIDE 10 MG/1
10 TABLET ORAL 3 TIMES DAILY
Qty: 90 TAB | Refills: 3 | Status: SHIPPED | OUTPATIENT
Start: 2018-08-15

## 2018-08-15 NOTE — MR AVS SNAPSHOT
[address] [facility] 
 
 
 [address], [address] [facility] 
[phone] Patient: Shari Leal MRN: [id number] SPENCER:[birth year] Visit Information Date & Time Provider Department Dept. Phone Encounter #  
 8/15/2018 11:30 AM Rancho Sanchez MD [facility]  Follow-up Instructions Return in about 4 months (around 12/15/2018). Your Appointments 12/12/2018 10:00 AM  
Office Visit with Rancho Sanchez MD  
[facility] Appt Note: 4 month f/u  
 54[address] [facility], [facility] [address] [address] [address] [facility] [place]  
  
   
 [address], [address]  
  
    
  
 10/30/2018  1:00 PM  
Any with Magaly Bunn MD  
[facility] ([facility] Appt Note: Return in about 6 months (around 10/27/2018) for follow up. [address] [facility] [address] [address] [address]  
  
   
 [address]57 [facility] [address] [place] [address] 200 [facility] Upcoming Health Maintenance Date Due DTaP/Tdap/Td series (1 - Tdap) 5/26/1962 ZOSTER VACCINE AGE 60> 3/26/2001 GLAUCOMA SCREENING Q2Y 5/26/2006 Bone Densitometry (Dexa) Screening 5/26/2006 Pneumococcal 65+ Low/Medium Risk (1 of 2 - PCV13) 5/26/2006 MEDICARE YEARLY EXAM 3/14/2018 Influenza Age 5 to Adult 8/1/2018 Allergies as of 8/15/2018  Review Complete On: 8/15/2018 By: Rancho Sanchez MD  
  
 Severity Noted Reaction Type Reactions Hydrocodone Bitartrate High 07/17/2015    Nausea Only Ibuprofen High 07/17/2015    Other (comments) Platelets drop Iodinated Contrast- Oral And Iv Dye High 07/17/2015    Other (comments) Iodine  03/02/2018    Swelling Current Immunizations  Never Reviewed No immunizations on file. Not reviewed this visit You Were Diagnosed With   
 Codes Comments Rotator cuff disorder, right    -  Primary ICD-10-CM: E80.023 ICD-9-CM: 726.10 Osteoarthritis, unspecified osteoarthritis type, unspecified site     ICD-10-CM: M19.90 ICD-9-CM: 715.90 Numbness and tingling     ICD-10-CM: R20.0, R20.2 ICD-9-CM: 991. 0 Vitamin D deficiency     ICD-10-CM: E55.9 ICD-9-CM: 268.9 Osteopenia, unspecified location     ICD-10-CM: M85.80 ICD-9-CM: 733.90 Anxiety disorder, unspecified type     ICD-10-CM: F41.9 ICD-9-CM: 300.00 Hyperlipidemia, unspecified hyperlipidemia type     ICD-10-CM: E78.5 ICD-9-CM: 272.4 Prediabetes     ICD-10-CM: R73.03 
ICD-9-CM: 790.29 Lumbar pain with radiation down left leg     ICD-10-CM: M54.5 ICD-9-CM: 724.2 Vitals BP Pulse Temp Resp Height(growth percentile) Weight(growth percentile) 120/60 (BP 1 Location: Left arm, BP Patient Position: Sitting) 87 97.6 °F (36.4 °C) (Oral) 18 5' 3\" (1.6 m) 161 lb (73 kg) SpO2 BMI OB Status Smoking Status 99% 28.52 kg/m2 Menopause Former Smoker Vitals History BMI and BSA Data Body Mass Index Body Surface Area 28.52 kg/m 2 1.8 m 2 [facility]erred [facility] Name Phone [facility] [facility] [address] [phone] Your Updated Medication List  
  
   
This list is accurate as of 8/15/18 12:31 PM.  Always use your most recent med list.  
  
  
  
  
 busPIRone 10 mg tablet Commonly known as:  BUSPAR Take 1 Tab by mouth three (3) times daily. celecoxib 200 mg capsule Commonly known as:  CELEBREX Take 200 mg by mouth two (2) times a day. clobetasol 0.05 % external solution Commonly known as:  TEMOVATE  
  
 COL-RITE 100 mg capsule Generic drug:  docusate sodium  
take 1 capsule by mouth once daily  
  
 cyclobenzaprine 10 mg tablet Commonly known as:  FLEXERIL  
CYCLOBENZAPRINE HCL 10MG, 1 (ONE) TABLET TABLET EVERY 8 HOURS, AS NEEDED # 90, 03/17/2015, REF. X3. ACTIVE  
  
 desonide 0.05 % topical lotion Commonly known as:  Lady Elvira Apply  to affected area two (2) times a day. * diclofenac 1 % Gel Commonly known as:  VOLTAREN  
  
 * diclofenac 1 % Gel Commonly known as:  VOLTAREN Apply  to affected area four (4) times daily. FIBER SUPPLEMENT PO Take  by mouth.  
  
 gabapentin 300 mg capsule Commonly known as:  NEURONTIN Take 300 mg by mouth four (4) times daily. hydroCHLOROthiazide 25 mg tablet Commonly known as:  HYDRODIURIL Take 25 mg by mouth daily. ketoconazole 2 % shampoo Commonly known as:  NIZORAL  
  
 nystatin-triamcinolone 100,000-0.1 unit/gram-% ointment Commonly known as:  MYCOLOG  
nystatin-triamcinolone 100,000 unit/gram-0.1 % topical ointment  
  
 predniSONE 20 mg tablet Commonly known as:  Susen Arbour Take 1 Tab by mouth daily (with breakfast). raloxifene 60 mg tablet Commonly known as:  EVISTA  
  
 simvastatin 20 mg tablet Commonly known as:  ZOCOR  
TAKE 1 TABLET BY MOUTH EVERY NIGHT AT BEDTIME. tacrolimus 0.1 % ointment Commonly known as:  PROTOPIC  
tacrolimus 0.1 % topical ointment  
  
 venlafaxine-SR 75 mg capsule Commonly known as:  EFFEXOR-XR Take 75 mg by mouth daily. VITAMIN D2 50,000 unit capsule Generic drug:  ergocalciferol TAKE 1 CAPSULE BY MOUTH EVERY THURSDAY. * Notice: This list has 2 medication(s) that are the same as other medications prescribed for you. Read the directions carefully, and ask your doctor or other care provider to review them with you. Prescriptions Sent to Pharmacy Refills  
 cyclobenzaprine (FLEXERIL) 10 mg tablet 2 Sig: CYCLOBENZAPRINE HCL 10MG, 1 (ONE) TABLET TABLET EVERY 8 HOURS, AS NEEDED # 90, 03/17/2015, REF. X3. ACTIVE  Class: Normal  
 [facility] #: [phone]  
 busPIRone (BUSPAR) 10 mg tablet 3  
 Sig: Take 1 Tab by mouth three (3) times daily. Class: Normal  
 Pharmacy: [id number] [address] [facility], [address]  #: [phone] Route: Oral  
  
Follow-up Instructions Return in about 4 months (around 12/15/2018). To-Do List   
 08/15/2018 Lab:  CBC WITH AUTOMATED DIFF Around 08/15/2018 Lab:  HEMOGLOBIN A1C W/O EAG   
  
 08/15/2018 Lab:  LIPID PANEL   
  
 08/15/2018 Lab:  METABOLIC PANEL, COMPREHENSIVE   
  
 08/15/2018 Lab:  TSH AND FREE T4   
  
 08/15/2018 Lab:  VITAMIN D, 25 HYDROXY   
  
 09/04/2018 10:00 AM  
  Appointment with Nenita Metz PT at [facility] PT Taylor DE SOUZA ([phone]) Introducing [address]! Dear Raegan Olivarez: Thank you for requesting a [employer] account. Our records indicate that you already have an active [employer] account. You can access your account anytime at [email or web address] Did you know that you can access your hospital and ER discharge instructions at any time in [employer] sent[employer]? You can also review all of your test results from your hospital stay or ER visit. Additional Information If you have questions, please visit the Frequently Asked Questions section of the [employer] website at [email or web address] Remember, [employer] sent[employer]ics is NOT to be used for urgent needs. For medical emergencies, dial 911. Now available from your iPhone and Android! Please provide this summary of care documentation to your next provider. Your primary care clinician is listed as Aman Rios. If you have any questions after today's visit, please call [phone].

## 2018-08-15 NOTE — PROGRESS NOTES
[facility]     Lorrie Oconnor is a 69 y.o. female with relevant past medical history of chronic pain, arthritis, hyperlipidemia, HTN, recurrent UTIs, OAB, osteopenia, vit D deficiency, prediabetes, neuropathy, who presents today for follow up after R shoulder rotator cuff repair, by Dr. Amie Lynn on 7/20/18 with general anesthesia, unventful. Overall the patient feels well, except for chronic low back pain and R shoulder pain secondary recent surgery. Patient denies any chest pain, palpitations, leg swelling, dizziness, headaches, nausea, vomiting, diarrhea, abdominal pain, urinary symptoms including dysuria, urinary frequency, or hematuria. She does report occasional intermittent BL toes numbness and tingling, not painful, no local skin changes in color or temperature. This problem she says it is not new, it has been on going for many months now. She denies any leg pain, leg swelling, recent trauma or h/o LE surgery. She bring records from previous PCP about health maintenance. Copied below. ROS  As above included in [facility]. Otherwise 11 point review of systems negative including constitutional, skin, HENT, eyes, respiratory, cardiovascular, gastrointestinal, genitourinary, musculoskeletal, endocrine, hematologic, allergy, and neurologic. Past Medical History  Past Medical History:   Diagnosis Date    Arthritis     Breast tumor     Chronic pain     Hypercholesterolemia      No past surgical history on file. Family History  Family History   Problem Relation Age of Onset    Hypertension Mother     Diabetes Mother     Stroke Father     Heart Disease Brother        Social History  She  reports that she has quit smoking. She has never used smokeless tobacco.   History   Alcohol Use    0.6 oz/week    1 Glasses of wine per week     Comment: socially       Immunization History    There is no immunization history on file for this patient.     Allergies  Allergies   Allergen Reactions    Hydrocodone Bitartrate Nausea Only    Ibuprofen Other (comments)     Platelets drop     Iodinated Contrast- Oral And Iv Dye Other (comments)    Iodine Swelling       Medications  Current Outpatient Prescriptions   Medication Sig    COL-RITE 100 mg capsule take 1 capsule by mouth once daily    cyclobenzaprine (FLEXERIL) 10 mg tablet CYCLOBENZAPRINE HCL 10MG, 1 (ONE) TABLET TABLET EVERY 8 HOURS, AS NEEDED # 90, 03/17/2015, REF. X3. ACTIVE    busPIRone (BUSPAR) 10 mg tablet Take 1 Tab by mouth three (3) times daily.  simvastatin (ZOCOR) 20 mg tablet TAKE 1 TABLET BY MOUTH EVERY NIGHT AT BEDTIME.  diclofenac (VOLTAREN) 1 % gel Apply  to affected area four (4) times daily.  desonide (TRIDESILON) 0.05 % topical lotion Apply  to affected area two (2) times a day.  psyllium seed, with sugar, (FIBER SUPPLEMENT PO) Take  by mouth.  predniSONE (DELTASONE) 20 mg tablet Take 1 Tab by mouth daily (with breakfast).  clobetasol (TEMOVATE) 0.05 % external solution     diclofenac (VOLTAREN) 1 % gel     nystatin-triamcinolone (MYCOLOG) 100,000-0.1 unit/gram-% ointment nystatin-triamcinolone 100,000 unit/gram-0.1 % topical ointment    tacrolimus (PROTOPIC) 0.1 % ointment tacrolimus 0.1 % topical ointment    celecoxib (CELEBREX) 200 mg capsule Take 200 mg by mouth two (2) times a day.  VITAMIN D2 50,000 unit capsule TAKE 1 CAPSULE BY MOUTH EVERY THURSDAY.  gabapentin (NEURONTIN) 300 mg capsule Take 300 mg by mouth four (4) times daily.  hydroCHLOROthiazide (HYDRODIURIL) 25 mg tablet Take 25 mg by mouth daily.  ketoconazole (NIZORAL) 2 % shampoo     raloxifene (EVISTA) 60 mg tablet     venlafaxine-SR (EFFEXOR-XR) 75 mg capsule Take 75 mg by mouth daily. No current facility-administered medications for this visit.           Visit Vitals    /60 (BP 1 Location: Left arm, BP Patient Position: Sitting)    Pulse 87    Temp 97.6 °F (36.4 °C) (Oral)    Resp 18    Ht 5' 3\" (1.6 m)    Wt 161 lb (73 kg)    SpO2 99%    BMI 28.52 kg/m2     Body mass index is 28.52 kg/(m^2). Physical Exam   Constitutional: She is oriented to person, place, and time and well-developed, well-nourished, and in no distress. HENT:   Head: Normocephalic and atraumatic. Right Ear: External ear normal.   Left Ear: External ear normal.   Nose: Nose normal.   Mouth/Throat: Oropharynx is clear and moist.   Eyes: EOM are normal. Pupils are equal, round, and reactive to light. Neck: Normal range of motion. Neck supple. Cardiovascular: Normal rate, regular rhythm, normal heart sounds and intact distal pulses. Pulmonary/Chest: Effort normal and breath sounds normal. No respiratory distress. Abdominal: Soft. Bowel sounds are normal.   Musculoskeletal: Normal range of motion. She exhibits no edema. Right shoulder pain with internal rotation and abduction. Full ROM. Neurological: She is alert and oriented to person, place, and time. Skin: Skin is warm. No rash noted. No erythema. No pallor. Psychiatric: Mood and affect normal.   Nursing note and vitals reviewed. REVIEW OF DATA    Labs  No visits with results within 1 Month(s) from this visit.   Latest known visit with results is:    Office Visit on 04/27/2018   Component Date Value Ref Range Status    Color (UA POC) 04/27/2018 Yellow   Final    Clarity (UA POC) 04/27/2018 Clear   Final    Glucose (UA POC) 04/27/2018 Negative  Negative Final    Bilirubin (UA POC) 04/27/2018 Negative  Negative Final    Ketones (UA POC) 04/27/2018 Negative  Negative Final    Specific gravity (UA POC) 04/27/2018 1.010  1.001 - 1.035 Final    Blood (UA POC) 04/27/2018 Trace  Negative Final    pH (UA POC) 04/27/2018 7.0  4.6 - 8.0 Final    Protein (UA POC) 04/27/2018 Negative  Negative Final    Urobilinogen (UA POC) 04/27/2018 0.2 mg/dL  0.2 - 1 Final    Nitrites (UA POC) 04/27/2018 Negative  Negative Final    Leukocyte esterase (UA POC) 04/27/2018 Negative  Negative Final         CT Results (most recent):    Results from [place] encounter on 11/30/15   CT CHEST WO CONT   Narrative EXAMINATION: CT chest without contrast.    HISTORY:  Thoracic spine pain. Right chest and rib pain 9-10 weeks. No history  of trauma. TECHNIQUE:  Multiple contiguous axial CT images at 5 mm increments were obtained from the  apex of the lungs to the  domes of the diaphragm without intravenous contrast.    FINDINGS:    Minimal bibasilar discoid atelectasis versus scarring. No lung masses. Pretracheal lymph node 12 x 18 mm. Several smaller right paratracheal lymph  nodes. Small aortic pulmonic lymph nodes. No gross hilar lymph node enlargement. Mild degenerative spurring minimal thoracic levels. No compression deformities. No blastic or lytic lesions in the thoracic spine. No discrete rib lesions  identified. No evidence of healing rib fracture. IMPRESSION:  1. Nonspecific prominent pretracheal lymph nodes likely reactive. Recommend  followup in 6 months with intravenous contrast to ensure stability. 2.  Mild degenerative changes of the thoracic spine without evidence of acute  compression fracture or lesion. 3.  No discrete rib lesions identified. ST/acw    Electronically signed by: Charles Larry Date:  11/30/2015 21:11         XR Results (most recent):    Results from [place] encounter on 02/16/16   XR RIBS UNILATERAL   Narrative HISTORY: Right rib pain. IMPRESSION: Three views of the right ribs reveal no rib fracture, hemothorax, or  pneumothorax. VDL/pab    Electronically signed by: Channing Reyes   Date:  02/16/2016 14:43       CT   All Micro Results     None             HCM  Last mammogram 1/30/18 per patient unremarkable  Last colonoscopy 11/12/2014 per patient unremarkable  Last DEXA scan 7/13/17 per patient osteopenia  Last glaucoma screening 8/17/17 per patient unremarkable  Pap smear 5/21/18 benign, neg HPV  VZV vaccine 4/27/18 first dose Shingrix  Pneumococcal vaccination 3/3/11  Influenza vaccine 9/19/17  DIAGNOSIS AND PLAN  Patient Active Problem List   Diagnosis Code    Breast tumor D49.3    Chronic pain G89.29    Hypercholesterolemia E78.00    Arthritis M19.90    History of recurrent UTIs Z87.440    Overactive bladder N32.81     1. Numbness and Tingling  Of BL Toes  - Following with ortho. - Pain responds to gabapentin partiallly and celecoxib  - Trial of prednisone for radiculopathic symptoms  Helped according to patient  - will check HbA1c given h/o prediabetes, concern for worsening to diabetes causing neuropathy, will call patient with results. 2. 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 Surgical Preoperative Clearance  - S/p rotator cuff surgical repair, by Dr. Harinder Felix on July 20, 2201, with no complications, planning to transition to PT/rehab soon. Patient requesting flexeril refill, reports it helps with her pain. 3. Vit D deficiency  4. Osteopenia  - Per patient could not tolerate bisphosphonates before due to GI side effects  - Will monitor vitamin D levels. Depending on results ergocalciferol therapy dosage modification. I will contact patient with results. - Recommended to start Calcium carbonate 600mg PO daily  - Patient reports last DEXA bone scan was done 7/13/2017. Repeat in 1 year  - Following with rheumatology    5. Anxiety disorder  Stable. Buspirone refill requested- done    6. Hyperlipidemia  - Lipid panel today, c/w simvastatin as prescribed for now    7. Prediabetes  Will check HbA1c, concern for progression, causing neuropathy. Will contact patient with results. Follow-up Disposition:  Return in about 4 months (around 12/15/2018). Elen Morales MD

## 2018-08-16 LAB — 25(OH)D3 SERPL-MCNC: 75 NG/ML (ref 30–100)

## 2018-09-04 ENCOUNTER — HOSPITAL ENCOUNTER (OUTPATIENT)
Dept: PHYSICAL THERAPY | Age: 78
Discharge: HOME | End: 2018-09-04
Payer: MEDICARE

## 2018-09-04 PROCEDURE — G8984 CARRY CURRENT STATUS: HCPCS

## 2018-09-04 PROCEDURE — G8985 CARRY GOAL STATUS: HCPCS

## 2018-09-04 PROCEDURE — 97110 THERAPEUTIC EXERCISES: CPT

## 2018-09-04 PROCEDURE — 97162 PT EVAL MOD COMPLEX 30 MIN: CPT

## 2018-09-04 PROCEDURE — 97140 MANUAL THERAPY 1/> REGIONS: CPT

## 2018-09-04 NOTE — PROGRESS NOTES
[facility] - [facility]              [address]        [place], [address]   [phone] fax    Plan of Care/ Statement of Necessity for Physical Therapy Services    Patient name: Danika Mayes Start of Care: 2018   Referral source: Jared Riley* : 1940    Medical Diagnosis: Pain in right shoulder [M25.511]   Onset Date:DoS 2018    Treatment Diagnosis: s/p Massive RCR   Prior Hospitalization: see medical history Provider#: [id number]   Medications: Verified on Patient summary List    Comorbidities: Arthritis, Osteoporosis, Right CMC arthritis, Back Pain   Prior Level of Function: RHD, (I) Functional ADLs, (I) Driving, Retired, No previous cervical nor shoulder injuries      The Plan of Care and following information is based on the information from the initial evaluation. Assessment:    Patient is a RHD female who presents s/p right RCR 2018 secondary to chronic discomfort with patient reporting prior to undergoing surgical intervention receival of physical therapy services and steroid injection with minimal long-term benefit. Secondary to patient with massive rotator cuff tear patient with use of sling x6 weeks without prescription of physical therapy services until after 6 weeks post-operatively to increase post-operative success. Patient presents with complete healing of arthroscopic surgical incisions with a normal neurovascular screen with patient subjectively reporting full compliance with MD prescribed post-operative precautions. Patient demonstrates appropriate right shoulder PROM, in accordance with post-operative state, with empty end-feels noted at end-range in all directions. Diminished scapular lift up demonstrated with poor scapulothoracic rhythm with UE elevation demonstrated.  Reviewed with patient post-operative precautions and modification of self-care and household ADLs to improve independence and reduce risk of reinjury. Patient to be progressed in accordance with patient tolerance and MD protocol to promote improved ease with return to PLOF and full functional use of the right UE.      Patient will continue to benefit from skilled PT services to modify and progress therapeutic interventions, address functional mobility deficits, address ROM deficits, address strength deficits, analyze and address soft tissue restrictions, analyze and cue movement patterns and analyze and modify body mechanics/ergonomics to attain remaining goals. Key Information:    BP: 124/78 mmHg  Posture: Bilateral rounded shoulders with forward head posture     Surgical Incision: 5 fully healed arthroscopic incisions visualized. Neurological Screen: 2+ Radial Pulse, Intact sensation to light touch     ROM:  [] Unable to assess at this time                                             AROM                                                                PROM    Left Right   Left Right   Flexion 150 80 Flexion   135   Extension 50 NT Extension       Abduction 118 48 Scaption/ABD   105   Functional ER C7 Right Ear ER @ 90 Degrees   40   Functional IR T7 NT IR @ 90 Degrees       **Right Elbow 0-140 degrees     Strength:   [] Unable to assess at this time                                                                             L (1-5) R (1-5)   Flexors 5 2+   Abductors 4 2+   External Rotators 4 4   Internal Rotators 5 5   Extensors 5 5   Elbow Flexion 5 5   Elbow Extension 5 5      Joint Mobility: Normal scapular mobility with limited scapular inferior angle lift off     Evaluation Complexity History MEDIUM  Complexity : 1-2 comorbidities / personal factors will impact the outcome/ POC ; Examination MEDIUM Complexity : 3 Standardized tests and measures addressing body structure, function, activity limitation and / or participation in recreation  ;Presentation MEDIUM Complexity : Evolving with changing characteristics  ; Clinical Decision Making MEDIUM Complexity : FOTO score of 26-74  Overall Complexity Rating: MEDIUM  Problem List: pain affecting function, decrease ROM, decrease strength, edema affecting function, decrease ADL/ functional abilitiies, decrease activity tolerance, decrease flexibility/ joint mobility and decrease transfer abilities   Treatment Plan may include any combination of the following: Therapeutic exercise, Therapeutic activities, Neuromuscular re-education, Physical agent/modality, Manual therapy, Patient education, Self Care training, Functional mobility training and Home safety training  Patient / Family readiness to learn indicated by: asking questions, trying to perform skills and interest  Persons(s) to be included in education: patient (P)  Barriers to Learning/Limitations: None  Patient Goal (s): I want to be pain-free and be able to freely use my right arm  Patient Self Reported Health Status: good  Rehabilitation Potential: good    Short Term Goals: To be accomplished in 3 weeks:  1. Patient will subjectively report full compliance with prescribed HEP. 2. Patient will demonstrate right shoulder flexion and abduction PROM >/=160 degrees to improve ease with bathing. 3. Patient will demonstrate right shoulder flexion and abduction AROM >/=140 degrees to improve ease with functional overhead ADLs. Long Term Goals: To be accomplished in 6 weeks:  1. Patient will demonstrate a significant functional improvement as demonstrated by a score of >/=61 on FOTO. 2. Patient will demonstrate right shoulder flexion, abduction and ER MMT >/=4+/5 to improve ease with overhead lifting. 3. Patient will demonstrate right shoulder functional ER >/=C4 and functional IR >/=L3 to improve ease with dressing. Frequency / Duration: Patient to be seen 2 times per week for 8 weeks.     Patient/ Caregiver education and instruction: Diagnosis, prognosis, self care, activity modification and exercises   [x]  Plan of care has been reviewed with PTA    G-Codes (GP)  Carry   Current  CK= 40-59%   [id number] Goal  CJ= 20-39%    The severity rating is based on clinical judgment and the FOTO score. Certification Period: 9/4/2018 - 11/3/2018  Yudelka Strauss, PT 9/4/2018 8:24 AM  ________________________________________________________________________    I certify that the above Therapy Services are being furnished while the patient is under my care. I agree with the treatment plan and certify that this therapy is necessary.     Physician's Signature:____________Date:_________TIME:________    ** Signature, Date and Time must be completed for valid certification **  Please sign and return to [facility] - [address], [address]   [phone] fax

## 2018-09-04 NOTE — PROGRESS NOTES
PT DAILY TREATMENT NOTE - [facility]     Patient Name: Gurwinder Campbell  Date:2018  : 1940  [x]  Patient  Verified  [place] / Plan: [place] & B / Product Type: Medicare /    In time:1005  Out time:1053  Total Treatment Time (min): 48  Total Timed Codes (min): 23  1:1 Treatment Time ( only): 50   Visit #: 1 of 16    Treatment Area: Pain in right shoulder [M25.511]    Physical Therapy Evaluation - Shoulder    SUBJECTIVE      Any medication changes, allergies to medications, adverse drug reactions, diagnosis change, or new procedure performed?: [x] No    [] Yes (see summary sheet for update)    Subjective functional status/changes:     PLOF: RHD, (I) Functional ADLs, (I) Driving, Retired, No previous cervical nor shoulder injuries  Current Functional Status: Limited use of right UE with functional household and self-care ADLs  Work Hx: Retired  Living Situation: Lives with   Comorbidities: Arthritis, Osteoporosis, Right CMC arthritis, Back Pain  Medications: None taken    Pain Intensity (0-10, VAS): Current 2, Worst 5, Best 0    Patient Goals: \"I want to be pain-free and be able to freely use my right arm\"    OBJECTIVE EXAMINATION    BP: 124/78 mmHg  Posture: Bilateral rounded shoulders with forward head posture    Surgical Incision: 5 fully healed arthroscopic incisions visualized.    Neurological Screen: 2+ Radial Pulse, Intact sensation to light touch    ROM:  [] Unable to assess at this time                                             AROM                                                                PROM   Left Right  Left Right   Flexion 150 80 Flexion  135   Extension 50 NT Extension     Abduction 118 48 Scaption/ABD  105   Functional ER C7 Right Ear ER @ 90 Degrees  4-   Functional IR T7 NT IR @ 90 Degrees     **Right Elbow 0-140 degrees    Strength:   [] Unable to assess at this time                                                                            L (1-5) R (1-5)   Flexors 5 2+   Abductors 4 2+   External Rotators 4 4   Internal Rotators 5 5   Extensors 5 5   Elbow Flexion 5 5   Elbow Extension 5 5     Joint Mobility: Normal scapular mobility with limited scapular inferior angle lift off    OBJECTIVE    25 min [x]Eval                  []Re-Eval     8 min Therapeutic Exercise:  [x] See flow sheet : Patient educated regarding completion of prescribed HEP and provided with written HEP instructions, Patient educated regarding physical therapy post-operative protocol   Rationale: increase ROM and increase strength to improve the patients ability to improve ease with household ADLs    5 min Therapeutic Activity:  [x]  See flow sheet : Reviewed modifications with completion of self-care and household ADLs to promote improved independence without risk of reinjury, Reviewed importance of regaining a normalized arm swing   Rationale: increase ROM and increase strength  to improve the patients ability to improve ease with self-care ADLs     10 min Manual Therapy:    Left Sidelying, Right Scapular Medial Glide  Supine, Right Shoulder Physiological Flexion Grade II-III Mobilization  Supine, Right Shoulder Physiological Flexion Grade II-III Mobilization   Rationale: decrease pain, increase ROM and increase tissue extensibility to improve ease with functional ADLs          With   [] TE   [] TA   [] neuro   [] other: Patient Education: [x] Review HEP    [] Progressed/Changed HEP based on:   [] positioning   [] body mechanics   [] transfers   [] heat/ice application    [] other:      Pain Level (0-10 scale) post treatment: 2    ASSESSMENT/Changes in Function: Patient is a RHD female who presents s/p right RCR 7/20/2018 secondary to chronic discomfort with patient reporting prior to undergoing surgical intervention receival of physical therapy services and steroid injection with minimal long-term benefit.  Secondary to patient with massive rotator cuff tear patient with use of sling x6 weeks without prescription of physical therapy services until after 6 weeks post-operatively to increase post-operative success. Patient presents with complete healing of arthroscopic surgical incisions with a normal neurovascular screen with patient subjectively reporting full compliance with MD prescribed post-operative precautions. Patient demonstrates appropriate right shoulder PROM, in accordance with post-operative state, with empty end-feels noted at end-range in all directions. Diminished scapular lift up demonstrated with poor scapulothoracic rhythm with UE elevation demonstrated. Reviewed with patient post-operative precautions and modification of self-care and household ADLs to improve independence and reduce risk of reinjury. Patient to be progressed in accordance with patient tolerance and MD protocol to promote improved ease with return to PLOF and full functional use of the right UE. Patient will continue to benefit from skilled PT services to modify and progress therapeutic interventions, address functional mobility deficits, address ROM deficits, address strength deficits, analyze and address soft tissue restrictions, analyze and cue movement patterns and analyze and modify body mechanics/ergonomics to attain remaining goals. [x]  See Plan of Care  []  See progress note/recertification  []  See Discharge Summary         Progress towards goals / Updated goals:    Short Term Goals: To be accomplished in 3 weeks:  1. Patient will subjectively report full compliance with prescribed HEP. Eval: HEP provided  2. Patient will demonstrate right shoulder flexion and abduction PROM >/=160 degrees to improve ease with bathing. Eval: Right Shoulder Flexion  PROM 135 deg, Right Shoulder Abduction PROM 105 deg  3. Patient will demonstrate right shoulder flexion and abduction AROM >/=140 degrees to improve ease with functional overhead ADLs.   Eval; Right Shoulder Flexion AROM 80 deg, Right Shoulder Abduction AROM 48 deg    Long Term Goals: To be accomplished in 6 weeks:  1. Patient will demonstrate a significant functional improvement as demonstrated by a score of >/=61 on FOTO. Eval: FOTO = 50  2. Patient will demonstrate right shoulder flexion, abduction and ER MMT >/=4+/5 to improve ease with overhead lifting. Eval: Flexion 2+/5, Abduction 2+/5, ER 4/5  3. Patient will demonstrate right shoulder functional ER >/=C4 and functional IR >/=L3 to improve ease with dressing.   Eval; Functional ER = Right Ear, Functional IR NT    PLAN  [x]  Upgrade activities as tolerated     []  Continue plan of care  []  Update interventions per flow sheet       []  Discharge due to:_  []  Other:_      Sharron Nguyen PT 9/4/2018  8:23 AM    Future Appointments  Date Time Provider Amalia Concepcion   9/4/2018 10:00 AM Sharron Nguyen PT [facility]   9/5/2018 11:30 AM [facility]   12/12/2018 10:00 AM Delfina Koehler MD [facility]

## 2018-09-05 ENCOUNTER — OFFICE VISIT (OUTPATIENT)
Dept: INTERNAL MEDICINE CLINIC | Age: 78
End: 2018-09-05

## 2018-09-05 VITALS
BODY MASS INDEX: 28.7 KG/M2 | DIASTOLIC BLOOD PRESSURE: 58 MMHG | OXYGEN SATURATION: 98 % | RESPIRATION RATE: 14 BRPM | HEART RATE: 97 BPM | HEIGHT: 63 IN | SYSTOLIC BLOOD PRESSURE: 122 MMHG | TEMPERATURE: 97.9 F | WEIGHT: 162 LBS

## 2018-09-05 DIAGNOSIS — M25.471 ANKLE EDEMA, BILATERAL: Primary | ICD-10-CM

## 2018-09-05 DIAGNOSIS — M25.472 ANKLE EDEMA, BILATERAL: Primary | ICD-10-CM

## 2018-09-05 NOTE — PROGRESS NOTES
1. Have you been to the ER, urgent care clinic or hospitalized since your last visit? NO.     2. Have you seen or consulted any other health care providers outside of the [address] [place] [address] since your last visit (Include any pap smears or colon screening)? NO      Do you have an Advanced Directive?  Yes

## 2018-09-05 NOTE — PROGRESS NOTES
HPI/History  Gurwinder Campbell is a 69 y.o.  female who presents for evaluation. Pt reports having bilat ankle edema last week but improved to resolution since the weekend. No signs of thrombosis at any point; no cardiopulmonary sxs. No other sxs/complaints. Excess salt intake generally an issue per report. Also less active following shoulder surgery but no immobility. She consistently uses celebrex and some other meds that may predispose to edema as below. Labs on 8/15 unremarkable aside from sodium being slightly low. Weight stable. Patient Active Problem List   Diagnosis Code    Breast tumor D49.3    Chronic pain G89.29    Hypercholesterolemia E78.00    Arthritis M19.90    History of recurrent UTIs Z87.440    Overactive bladder N32.81     Past Medical History:   Diagnosis Date    Arthritis     Breast tumor     Chronic pain     Hypercholesterolemia      History reviewed. No pertinent surgical history. Social History     Social History    Marital status:      Spouse name: N/A    Number of children: N/A    Years of education: N/A     Occupational History    Not on file. Social History Main Topics    Smoking status: Former Smoker    Smokeless tobacco: Never Used      Comment: in college    Alcohol use 0.6 oz/week     1 Glasses of wine per week      Comment: socially    Drug use: No    Sexual activity: Not Currently     Partners: Male     Other Topics Concern    Not on file     Social History Narrative     Family History   Problem Relation Age of Onset    Hypertension Mother     Diabetes Mother     Stroke Father     Heart Disease Brother      Current Outpatient Prescriptions   Medication Sig    COL-RITE 100 mg capsule take 1 capsule by mouth once daily    cyclobenzaprine (FLEXERIL) 10 mg tablet CYCLOBENZAPRINE HCL 10MG, 1 (ONE) TABLET TABLET EVERY 8 HOURS, AS NEEDED # 90, 03/17/2015, REF.  X3. ACTIVE    busPIRone (BUSPAR) 10 mg tablet Take 1 Tab by mouth three (3) times daily.  simvastatin (ZOCOR) 20 mg tablet TAKE 1 TABLET BY MOUTH EVERY NIGHT AT BEDTIME.  diclofenac (VOLTAREN) 1 % gel Apply  to affected area four (4) times daily.  desonide (TRIDESILON) 0.05 % topical lotion Apply  to affected area two (2) times a day.  psyllium seed, with sugar, (FIBER SUPPLEMENT PO) Take  by mouth.  clobetasol (TEMOVATE) 0.05 % external solution     diclofenac (VOLTAREN) 1 % gel     nystatin-triamcinolone (MYCOLOG) 100,000-0.1 unit/gram-% ointment nystatin-triamcinolone 100,000 unit/gram-0.1 % topical ointment    tacrolimus (PROTOPIC) 0.1 % ointment tacrolimus 0.1 % topical ointment    celecoxib (CELEBREX) 200 mg capsule Take 200 mg by mouth two (2) times a day.  VITAMIN D2 50,000 unit capsule TAKE 1 CAPSULE BY MOUTH EVERY THURSDAY.  gabapentin (NEURONTIN) 300 mg capsule Take 300 mg by mouth four (4) times daily.  hydroCHLOROthiazide (HYDRODIURIL) 25 mg tablet Take 25 mg by mouth daily.  ketoconazole (NIZORAL) 2 % shampoo     raloxifene (EVISTA) 60 mg tablet     venlafaxine-SR (EFFEXOR-XR) 75 mg capsule Take 75 mg by mouth daily. No current facility-administered medications for this visit. Allergies   Allergen Reactions    Hydrocodone Bitartrate Nausea Only    Ibuprofen Other (comments)     Platelets drop     Iodinated Contrast- Oral And Iv Dye Other (comments)    Iodine Swelling       Review of Systems  Aside from those included in HPI, remainder of ROS negative. Physical Examination  Visit Vitals    /58 (BP 1 Location: Left arm, BP Patient Position: Sitting)    Pulse 97    Temp 97.9 °F (36.6 °C) (Oral)    Resp 14    Ht 5' 3\" (1.6 m)    Wt 162 lb (73.5 kg)    SpO2 98%    BMI 28.7 kg/m2       General - Alert and in no acute distress. Pt appears well, comfortable, and in good spirits. Pleasant, engaging. Nontoxic. Not anxious, non-diaphoretic.   Mental status - Appropriate mood, behavior, speech content, dress, and thought processes. Pulm - No cough. Full, complete sentences. No tachypnea, retractions, or cyanosis. Good respiratory effort. Clear to auscultation bilat. No appreciable wheezes, rales, or rhonchi. Cardiovascular - Normal rate, regular rhythm. No appreciable murmurs or gallops noted. Extremities - No peripheral or LE edema noted currently. No signs of thrombosis; negative Verónica's bilat. Distal pulses intact. No other findings. Assessment and Plan  1. Bilat ankle edema last week which has resolved. No concerning sxs at any point or currently. Likely multifactorial and discussed at length. For now, elevate and be mindful of excess sodium. Other considerations and measures discussed. Will monitor for now with further planning (labs, other) as warranted and pending progression. Pt happily agrees with plan. PLEASE NOTE:   This document has been produced using voice recognition software. Unrecognized errors in transcription may be present.     [facility] [place]  [phone]  9/5/2018

## 2018-09-05 NOTE — MR AVS SNAPSHOT
[address] [facility] 
 
 
 [address], [address] 
[phone] Patient: Irene Garvey MRN: [id number] Visit Information Date & Time Provider Department Dept. Phone Encounter #  
 2018 11:30 AM Rafael Peng [facility] [id number] Your Appointments 2018 10:00 AM  
Office Visit with Marleny cA MD  
[facility]) Appt Note: 4 month f/u  
 [address] [facility], [address] [id number] [address] [place]  
  
   
 [address], [address] Upcoming Health Maintenance Date Due DTaP/Tdap/Td series (1 - Tdap) 1961 ZOSTER VACCINE AGE 60> 3/26/2001 GLAUCOMA SCREENING Q2Y 2006 Bone Densitometry (Dexa) Screening 2006 Pneumococcal 65+ Low/Medium Risk (1 of 2 - PCV13) 2006 MEDICARE YEARLY EXAM 3/14/2018 Influenza Age 5 to Adult 2018 Allergies as of 2018  Review Complete On: 2018 By: Rachele Zarate LPN Severity Noted Reaction Type Reactions Hydrocodone Bitartrate High 2015    Nausea Only Ibuprofen High 2015    Other (comments) Platelets drop Iodinated Contrast- Oral And Iv Dye High 2015    Other (comments) Iodine  2018    Swelling Current Immunizations  Never Reviewed No immunizations on file. Not reviewed this visit Vitals BP Pulse Temp Resp Height(growth percentile) Weight(growth percentile) 122/58 (BP 1 Location: Left arm, BP Patient Position: Sitting) 97 97.9 °F (36.6 °C) (Oral) 14 5' 3\" (1.6 m) 162 lb (73.5 kg) SpO2 BMI OB Status Smoking Status 98% 28.7 kg/m2 Menopause Former Smoker Vitals History BMI and BSA Data Body Mass Index Body Surface Area 28.7 kg/m 2 1.81 m 2 Preferred Pharmacy Pharmacy Name Phone [facility] [address] [facility], [facility] [address] [phone] Your Updated Medication List  
  
   
This list is accurate as of 9/5/18 11:36 AM.  Always use your most recent med list.  
  
  
  
  
 busPIRone 10 mg tablet Commonly known as:  BUSPAR Take 1 Tab by mouth three (3) times daily. celecoxib 200 mg capsule Commonly known as:  CELEBREX Take 200 mg by mouth two (2) times a day. clobetasol 0.05 % external solution Commonly known as:  TEMOVATE  
  
 COL-RITE 100 mg capsule Generic drug:  docusate sodium  
take 1 capsule by mouth once daily  
  
 cyclobenzaprine 10 mg tablet Commonly known as:  FLEXERIL  
CYCLOBENZAPRINE HCL 10MG, 1 (ONE) TABLET TABLET EVERY 8 HOURS, AS NEEDED # 90, 03/17/2015, REF. X3. ACTIVE  
  
 desonide 0.05 % topical lotion Commonly known as:  Maisha Shashank Apply  to affected area two (2) times a day. * diclofenac 1 % Gel Commonly known as:  VOLTAREN  
  
 * diclofenac 1 % Gel Commonly known as:  VOLTAREN Apply  to affected area four (4) times daily. FIBER SUPPLEMENT PO Take  by mouth.  
  
 gabapentin 300 mg capsule Commonly known as:  NEURONTIN Take 300 mg by mouth four (4) times daily. hydroCHLOROthiazide 25 mg tablet Commonly known as:  HYDRODIURIL Take 25 mg by mouth daily. ketoconazole 2 % shampoo Commonly known as:  NIZORAL  
  
 nystatin-triamcinolone 100,000-0.1 unit/gram-% ointment Commonly known as:  MYCOLOG  
nystatin-triamcinolone 100,000 unit/gram-0.1 % topical ointment  
  
 predniSONE 20 mg tablet Commonly known as:  Kelsea Aver Take 1 Tab by mouth daily (with breakfast). raloxifene 60 mg tablet Commonly known as:  EVISTA  
  
 simvastatin 20 mg tablet Commonly known as:  ZOCOR  
TAKE 1 TABLET BY MOUTH EVERY NIGHT AT BEDTIME. tacrolimus 0.1 % ointment Commonly known as:  PROTOPIC  
tacrolimus 0.1 % topical ointment  
  
 venlafaxine-SR 75 mg capsule Commonly known as:  EFFEXOR-XR Take 75 mg by mouth daily. VITAMIN D2 50,000 unit capsule Generic drug:  ergocalciferol TAKE 1 CAPSULE BY MOUTH EVERY THURSDAY. * Notice: This list has 2 medication(s) that are the same as other medications prescribed for you. Read the directions carefully, and ask your doctor or other care provider to review them with you. To-Do List   
 09/07/2018 9:00 AM  
  Appointment with Becca Weathers PT at [facility]  [address] [facility] ([phone])  
  
 09/10/2018 11:00 AM  
  Appointment with Becca Weathers PT at [facility]  [address] [facility] ([phone])  
  
 09/13/2018 8:30 AM  
  Appointment with Becca Weatehrs PT at [facility]  [address] [facility] ([phone])  
  
 09/18/2018 11:30 AM  
  Appointment with Becca Weathers PT at [facility]  [address] [facility] ([phone])  
  
 09/20/2018 10:00 AM  
  Appointment with Carlos Ellsworth PT at [facility]  [address] [facility] ([phone])  
  
 09/24/2018 10:30 AM  
  Appointment with Becca Weathers PT at [facility]  [address] [facility] ([phone])  
  
 09/26/2018 10:00 AM  
  Appointment with Carlos Ellsworth PT at [facility]  [address] [facility] ([phone])  
  
 10/01/2018 10:30 AM  
  Appointment with Becca Weathers PT at [facility]  [address] [facility] ([phone]) 10/04/2018 11:00 AM  
  Appointment with Becca Weathers PT at [facility]  [address] [facility] ([phone]) 10/09/2018 9:00 AM  
  Appointment with Becca Weathers PT at [facility]  [address] [facility] ([phone])  
  
 10/11/2018 10:00 AM  
  Appointment with Becca Weathers PT at [facility]  [address] ([phone]) Introducing [facility]! Dear Yenni Barillas: Thank you for requesting a [email or web address] account. Our records indicate that you already have an active [email or web address] account. You can access your account anytime at [email or web address] Did you know that you can access your hospital and ER discharge instructions at any time in [email or web address]?   You can also review all of your test results from your hospital stay or ER visit. Additional Information If you have questions, please visit the Frequently Asked Questions section of the [employer] website at [email or web address] Remember, [employer] is NOT to be used for urgent needs. For medical emergencies, dial 911. Now available from your iPhone and Android! Please provide this summary of care documentation to your next provider. Your primary care clinician is listed as Jase Dudley. If you have any questions after today's visit, please call [phone].

## 2018-09-07 ENCOUNTER — HOSPITAL ENCOUNTER (OUTPATIENT)
Dept: PHYSICAL THERAPY | Age: 78
Discharge: HOME | End: 2018-09-07
Payer: MEDICARE

## 2018-09-07 PROCEDURE — 97110 THERAPEUTIC EXERCISES: CPT

## 2018-09-07 PROCEDURE — 97140 MANUAL THERAPY 1/> REGIONS: CPT

## 2018-09-07 NOTE — PROGRESS NOTES
PT DAILY TREATMENT NOTE - [facility]     Patient Name: Danika Mayes  Date:2018  : 1940  [x]  Patient  Verified  [place] / Plan: [place] & B / Product Type: Medicare /    In time:0905  Out [id number]  Total Treatment Time (min): 43  Total Timed Codes (min): 33  1:1 Treatment Time ( only): 33   Visit #: 2 of 16    Treatment Area: Pain in right shoulder [M25.511]    SUBJECTIVE  Pain Level (0-10 scale): 3  Any medication changes, allergies to medications, adverse drug reactions, diagnosis change, or new procedure performed?: [x] No    [] Yes (see summary sheet for update)  Subjective functional status/changes:   [] No changes reported  Patient reports initiation of HEP as prescribed.     OBJECTIVE    Modality rationale: decrease inflammation and decrease pain to improve the patients ability to improve ease with sleep   Min Type Additional Details   10 [x]  Ice     []  heat  []  Ice massage Position: Reclined  Location: Right Shoulder, Post-Tx     23 min Therapeutic Exercise:  [x] See flow sheet : Emphasis placed on improving available shoulder AROM and strength and promotion of right shoulder PROM/AAROM   Rationale: increase ROM and increase strength to improve the patients ability to improve ease with self-care ADLs    10 min Manual Therapy:    Left Sidelying, Right Scapular Medial Glide  Supine, Right Shoulder Physiological Flexion Grade II-III Mobilization  Supine, Right Shoulder Physiological Flexion Grade II-III Mobilization   Rationale: decrease pain, increase ROM and increase tissue extensibility to improve ease with functional ADLs        With   [] TE   [] TA   [] neuro   [] other: Patient Education: [x] Review HEP    [] Progressed/Changed HEP based on:   [] positioning   [] body mechanics   [] transfers   [] heat/ice application    [] other:      Pain Level (0-10 scale) post treatment: 0    ASSESSMENT/Changes in Function: Initiated exercises per PoC with emphasis placed on promoting regaining of functional right shoulder AROM/AAROM, in accordance with MD prescription, with maintenance of correct scapulohumeral mechanics. Patient will continue to benefit from skilled PT services to modify and progress therapeutic interventions, address functional mobility deficits, address ROM deficits, address strength deficits, analyze and address soft tissue restrictions, analyze and cue movement patterns and analyze and modify body mechanics/ergonomics to attain remaining goals. []  See Plan of Care  []  See progress note/recertification  []  See Discharge Summary         Progress towards goals / Updated goals:    Short Term Goals: To be accomplished in 3 weeks:  1. Patient will subjectively report full compliance with prescribed HEP. Eval: HEP provided  Current: Met, HEP performance reported as prescribed, 9/7/2018  2. Patient will demonstrate right shoulder flexion and abduction PROM >/=160 degrees to improve ease with bathing. Eval: Right Shoulder Flexion  PROM 135 deg, Right Shoulder Abduction PROM 105 deg  3. Patient will demonstrate right shoulder flexion and abduction AROM >/=140 degrees to improve ease with functional overhead ADLs. Eval; Right Shoulder Flexion AROM 80 deg, Right Shoulder Abduction AROM 48 deg     Long Term Goals: To be accomplished in 6 weeks:  1. Patient will demonstrate a significant functional improvement as demonstrated by a score of >/=61 on FOTO. Eval: FOTO = 50  2. Patient will demonstrate right shoulder flexion, abduction and ER MMT >/=4+/5 to improve ease with overhead lifting. Eval: Flexion 2+/5, Abduction 2+/5, ER 4/5  3. Patient will demonstrate right shoulder functional ER >/=C4 and functional IR >/=L3 to improve ease with dressing.   Eval; Functional ER = Right Ear, Functional IR NT    PLAN  [x]  Upgrade activities as tolerated     [x]  Continue plan of care  []  Update interventions per flow sheet       []  Discharge due to:_  []  Other:_ Ivelisse Ledezma, PT 9/7/2018  6:54 AM    Future Appointments  Date Time Provider Amalia Concepcion   9/7/2018 9:00 AM Ivelisse Ledezma, PT [facility]   9/10/2018 11:00 AM Ivelisse Ledezma, PT [facility]   9/13/2018 8:30 AM Ivelisse Ledezma, PT [facility]   9/18/2018 11:30 AM Ivelisse Ledezma, PT [facility]   9/20/2018 10:00 AM Kristal Langston, PT [facility]   9/24/2018 10:30 AM Ivelisse Ledezma, PT [facility]   9/26/2018 10:00 AM Kristal Langston, PT [facility]   10/1/2018 10:30 AM Ivelisse Ledezma, PT [facility]   10/4/2018 11:00 AM Ivelisse Ledezma, PT [facility]   10/9/2018 9:00 AM Ivelisse Ledezma, PT [facility]   10/11/2018 10:00 AM Ivelisse Ledezma, PT [facility]   12/12/2018 10:00 AM Tai Loo MD [facility]

## 2018-09-10 ENCOUNTER — HOSPITAL ENCOUNTER (OUTPATIENT)
Dept: PHYSICAL THERAPY | Age: 78
Discharge: HOME | End: 2018-09-10
Payer: MEDICARE

## 2018-09-10 PROCEDURE — 97140 MANUAL THERAPY 1/> REGIONS: CPT

## 2018-09-10 PROCEDURE — 97110 THERAPEUTIC EXERCISES: CPT

## 2018-09-10 NOTE — PROGRESS NOTES
PT DAILY TREATMENT NOTE - [facility]     Patient Name: Shari Leal  Date:9/10/2018  : 1940  [x]  Patient  Verified  Payor: Jez Taylor / Plan: [place] & B / Product Type: Medicare /    In time:1105  Out time:1155  Total Treatment Time (min): 50  Total Timed Codes (min): 40  1:1 Treatment Time ([facility] only): 40   Visit #: 3 of 16    Treatment Area: Pain in right shoulder [M25.511]    SUBJECTIVE  Pain Level (0-10 scale): 2  Any medication changes, allergies to medications, adverse drug reactions, diagnosis change, or new procedure performed?: [x] No    [] Yes (see summary sheet for update)  Subjective functional status/changes:   [] No changes reported  Patient reports the ability to lift her arm up overhead in front of her this AM without pain.     OBJECTIVE    Modality rationale: decrease inflammation and decrease pain to improve the patients ability to improve ease with sleep   Min Type Additional Details   10 [x]  Ice     []  heat  []  Ice massage Position: Reclined  Location: Right Shoulder, Post-Tx     25 min Therapeutic Exercise:  [x] See flow sheet : Emphasis placed on improving available shoulder AROM and strength and promotion of right shoulder PROM/AAROM   Rationale: increase ROM and increase strength to improve the patients ability to improve ease with self-care ADLs     15   min Manual Therapy:    Left Sidelying, Right Scapular Medial Glide  Supine, Right Shoulder Physiological Flexion Grade II-III Mobilization  Supine, Right Shoulder Physiological Flexion Grade II-III Mobilization   Rationale: decrease pain, increase ROM and increase tissue extensibility to improve ease with functional ADLs        With   [] TE   [] TA   [] neuro   [] other: Patient Education: [x] Review HEP    [] Progressed/Changed HEP based on:   [] positioning   [] body mechanics   [] transfers   [] heat/ice application    [] other:      Pain Level (0-10 scale) post treatment: 0    ASSESSMENT/Changes in Function: Patient demonstrates a significant objective improvement in right shoulder AROM with success with progression of AAROM exercises demonstrated. Patient progressing well in accordance with post-operative protocol. Patient will continue to benefit from skilled PT services to modify and progress therapeutic interventions, address functional mobility deficits, address ROM deficits, address strength deficits, analyze and address soft tissue restrictions and analyze and cue movement patterns to attain remaining goals. []  See Plan of Care  []  See progress note/recertification  []  See Discharge Summary         Progress towards goals / Updated goals:    Short Term Goals: To be accomplished in 3 weeks:  1. Patient will subjectively report full compliance with prescribed HEP. Eval: HEP provided  Current: Met, HEP performance reported as prescribed, 9/7/2018  2. Patient will demonstrate right shoulder flexion and abduction PROM >/=160 degrees to improve ease with bathing. Eval: Right Shoulder Flexion  PROM 135 deg, Right Shoulder Abduction PROM 105 deg  3. Patient will demonstrate right shoulder flexion and abduction AROM >/=140 degrees to improve ease with functional overhead ADLs. Eval; Right Shoulder Flexion AROM 80 deg, Right Shoulder Abduction AROM 48 deg  Current: Progressing, Right Shoulder Flexion AROM 127 deg, Right Shoulder Abduction AROM 73 deg, 9/10/2018      Long Term Goals: To be accomplished in 6 weeks:  1. Patient will demonstrate a significant functional improvement as demonstrated by a score of >/=61 on FOTO. Eval: FOTO = 50  2. Patient will demonstrate right shoulder flexion, abduction and ER MMT >/=4+/5 to improve ease with overhead lifting. Eval: Flexion 2+/5, Abduction 2+/5, ER 4/5  3. Patient will demonstrate right shoulder functional ER >/=C4 and functional IR >/=L3 to improve ease with dressing.   Eval; Functional ER = Right Ear, Functional IR NT    PLAN  [x]  Upgrade activities as tolerated     [x]  Continue plan of care  []  Update interventions per flow sheet       []  Discharge due to:_  []  Other:_      Pratik Garcia, PT 9/10/2018  7:09 AM    Future Appointments  Date Time Provider Amalia Carlene   9/10/2018 11:00 AM Pratik Garcia, PT [facility]   9/13/2018 8:30 AM Pratik Garcia, PT [facility]   9/18/2018 11:30 AM Pratik Garcia, PT [facility]   9/20/2018 10:00 AM Charmel Barthel, PT [facility]   9/24/2018 10:30 AM Pratik Garcia, PT [facility]   9/26/2018 10:00 AM Charmel Barthel, PT [facility]   10/1/2018 10:30 AM Pratik Garcia, PT [facility]   10/4/2018 11:00 AM Pratik Garcia, PT [facility]   10/9/2018 9:00 AM Pratik Garcia, PT [facility]   10/11/2018 10:00 AM Pratik Garcia, PT [facility]   12/12/2018 10:00 AM Uriel Breen MD [facility]

## 2018-09-13 ENCOUNTER — APPOINTMENT (OUTPATIENT)
Dept: PHYSICAL THERAPY | Age: 78
End: 2018-09-13
Payer: MEDICARE

## 2018-09-18 ENCOUNTER — HOSPITAL ENCOUNTER (OUTPATIENT)
Dept: PHYSICAL THERAPY | Age: 78
Discharge: HOME | End: 2018-09-18
Payer: MEDICARE

## 2018-09-18 PROCEDURE — 97110 THERAPEUTIC EXERCISES: CPT

## 2018-09-18 PROCEDURE — 97140 MANUAL THERAPY 1/> REGIONS: CPT

## 2018-09-18 NOTE — PROGRESS NOTES
PT DAILY TREATMENT NOTE - [facility]     Patient Name: Adelia Thomas  Date:2018  : 1940  [x]  Patient  Verified  [place] / Plan: [place] & B / Product Type: Medicare /    In time:1130  Out time:1220  Total Treatment Time (min): 50  Total Timed Codes (min): 40  1:1 Treatment Time ( [address] only): 23   Visit #: 4 of 16    Treatment Area: Pain in right shoulder [M25.511]    SUBJECTIVE  Pain Level (0-10 scale): 1130  Any medication changes, allergies to medications, adverse drug reactions, diagnosis change, or new procedure performed?: [x] No    [] Yes (see summary sheet for update)  Subjective functional status/changes:   [] No changes reported  Patient reports slight increase in pain this AM secondary to overuse of the right shoulder over the weekend.     OBJECTIVE    Modality rationale: decrease inflammation and decrease pain to improve the patients ability to improve ease with sleep   Min Type Additional Details   10 [x]  Ice     []  heat  []  Ice massage Position: Reclined  Location: Right Shoulder, Post-Tx     30 min Therapeutic Exercise:  [x] See flow sheet : Emphasis placed on improving available shoulder AROM and strength and promotion of right shoulder PROM/AAROM   Rationale: increase ROM and increase strength to improve the patients ability to improve ease with self-care ADLs      10 min Manual Therapy:    Supine, Right Shoulder Physiological Flexion Grade II-III Mobilization  Supine, Right Shoulder Physiological Scaption Grade II-III Mobilization  Supine, Right Shoulder Physiological Abduction Grade II-III Mobilization  Supine, Right Shoulder Physiological ER (90 deg abduction) Grade II-III Mobilization   Rationale: decrease pain, increase ROM and increase tissue extensibility to improve ease with functional ADLs                                                           With   [] TE   [] TA   [] neuro   [] other: Patient Education: [x] Review HEP    [] Progressed/Changed HEP based on:   [] positioning   [] body mechanics   [] transfers   [] heat/ice application    [] other:      Pain Level (0-10 scale) post treatment: 2    ASSESSMENT/Changes in Function: Patient educated regarding importance of a graded return to PLOF with continued protection of the right shoulder with completion of functional ADLs. Patient noted with a significant objective improvement in available right shoulder AROM. Patient will continue to benefit from skilled PT services to modify and progress therapeutic interventions, address functional mobility deficits, address ROM deficits, address strength deficits, analyze and address soft tissue restrictions, analyze and cue movement patterns and analyze and modify body mechanics/ergonomics to attain remaining goals. []  See Plan of Care  []  See progress note/recertification  []  See Discharge Summary         Progress towards goals / Updated goals:    Short Term Goals: To be accomplished in 3 weeks:  1. Patient will subjectively report full compliance with prescribed HEP. Eval: HEP provided  Current: Met, HEP performance reported as prescribed, 9/7/2018  2. Patient will demonstrate right shoulder flexion and abduction PROM >/=160 degrees to improve ease with bathing. Eval: Right Shoulder Flexion  PROM 135 deg, Right Shoulder Abduction PROM 105 deg  3. Patient will demonstrate right shoulder flexion and abduction AROM >/=140 degrees to improve ease with functional overhead ADLs. Eval; Right Shoulder Flexion AROM 80 deg, Right Shoulder Abduction AROM 48 deg  Current: Progressing, Right Shoulder Flexion AROM 132 deg, Right Shoulder Abduction AROM 100 deg, 9/17/2018      Long Term Goals: To be accomplished in 6 weeks:  1. Patient will demonstrate a significant functional improvement as demonstrated by a score of >/=61 on FOTO. Eval: FOTO = 50  2.  Patient will demonstrate right shoulder flexion, abduction and ER MMT >/=4+/5 to improve ease with overhead lifting. Eval: Flexion 2+/5, Abduction 2+/5, ER 4/5  3. Patient will demonstrate right shoulder functional ER >/=C4 and functional IR >/=L3 to improve ease with dressing.   Eval; Functional ER = Right Ear, Functional IR NT    PLAN  [x]  Upgrade activities as tolerated     [x]  Continue plan of care  []  Update interventions per flow sheet       []  Discharge due to:_  []  Other:_      Kenna Ahr, PT 9/18/2018  8:26 AM    Future Appointments  Date Time Provider Amalia Concepcion   9/18/2018 11:30 AM Anatolya Ahr, PT [facility]   9/20/2018 10:00 AM Citlaly Ward, PT [facility]   9/24/2018 10:30 AM Anatolya Ahr, PT [facility]   9/26/2018 10:00 AM Citlaly Ward, PT [facility]   10/1/2018 10:30 AM Anatolya Ahr, PT [facility]   10/4/2018 11:00 AM Coriesea Ahr, PT [facility]   10/9/2018 9:00 AM Anatolya Ahr, PT [facility]   10/11/2018 10:00 AM Thresea Ahr, PT [facility]   12/12/2018 10:00 AM Albaro Mcmullen MD [facility]

## 2018-09-20 ENCOUNTER — HOSPITAL ENCOUNTER (OUTPATIENT)
Dept: PHYSICAL THERAPY | Age: 78
Discharge: HOME | End: 2018-09-20
Payer: MEDICARE

## 2018-09-20 PROCEDURE — 97110 THERAPEUTIC EXERCISES: CPT | Performed by: PHYSICAL THERAPIST

## 2018-09-20 PROCEDURE — 97140 MANUAL THERAPY 1/> REGIONS: CPT | Performed by: PHYSICAL THERAPIST

## 2018-09-20 NOTE — PROGRESS NOTES
PT DAILY TREATMENT NOTE - [facility]     Patient Name: Paula Liu  Date:2018  : 1940  [x]  Patient  Verified  [place] / Plan: [place] & B / Product Type: Medicare /    In time:10:05  Out time:11:10  Total Treatment Time (min): 65  Total Timed Codes (min): 55  1:1 Treatment Time ( [address] only): 30   Visit #: 5 of 16    Treatment Area: Pain in right shoulder [M25.511]    SUBJECTIVE  Pain Level (0-10 scale): 10  Any medication changes, allergies to medications, adverse drug reactions, diagnosis change, or new procedure performed?: [x] No    [] Yes (see summary sheet for update)  Subjective functional status/changes:   [] No changes reported  Patient reports that her mobility has been improving and she is using her arm more. OBJECTIVE    45 min Therapeutic Exercise:  [] See flow sheet :   Rationale: increase ROM, increase strength and improve coordination to improve the patients ability to increase their functional activity level. 10 min Manual Therapy:  Grade 2 & 3 GH joint mobs in supine to increase elevation and rotation. Rationale: decrease pain, increase ROM and increase tissue extensibility to increase ease of motion to improve function. With   [] TE   [] TA   [] neuro   [] other: Patient Education: [x] Review HEP    [] Progressed/Changed HEP based on:   [] positioning   [] body mechanics   [] transfers   [] heat/ice application    [] other:      Other Objective/Functional Measures: PROM is [facility]. She has a springy end feel to ROM. Pain Level (0-10 scale) post treatment: 1/10    ASSESSMENT/Changes in Function: Patient with decreased subjective c/o pain with improving mobility and function.     Patient will continue to benefit from skilled PT services to modify and progress therapeutic interventions, address functional mobility deficits, address ROM deficits, address strength deficits, analyze and address soft tissue restrictions, analyze and cue movement patterns and analyze and modify body mechanics/ergonomics to attain remaining goals. [x]  See Plan of Care  []  See progress note/recertification  []  See Discharge Summary         Progress towards goals / Updated goals:     Short Term Goals: To be accomplished in 3 weeks:  1. Patient will subjectively report full compliance with prescribed HEP. Eval: HEP provided  Current: Met, HEP performance reported as prescribed, 9/7/2018  2. Patient will demonstrate right shoulder flexion and abduction PROM >/=160 degrees to improve ease with bathing. Eval: Right Shoulder Flexion  PROM 135 deg, Right Shoulder Abduction PROM 105 deg  3. Patient will demonstrate right shoulder flexion and abduction AROM >/=140 degrees to improve ease with functional overhead ADLs. Eval; Right Shoulder Flexion AROM 80 deg, Right Shoulder Abduction AROM 48 deg  Current: Progressing, Right Shoulder Flexion AROM 132 deg, Right Shoulder Abduction AROM 100 deg, 9/17/2018      Long Term Goals: To be accomplished in 6 weeks:  1. Patient will demonstrate a significant functional improvement as demonstrated by a score of >/=61 on FOTO. Eval: FOTO = 50  2. Patient will demonstrate right shoulder flexion, abduction and ER MMT >/=4+/5 to improve ease with overhead lifting. Eval: Flexion 2+/5, Abduction 2+/5, ER 4/5  3. Patient will demonstrate right shoulder functional ER >/=C4 and functional IR >/=L3 to improve ease with dressing.   Eval; Functional ER = Right Ear, Functional IR NT    PLAN  [x]  Upgrade activities as tolerated     [x]  Continue plan of care  []  Update interventions per flow sheet       []  Discharge due to:_  []  Other:_      Carmela Dias, PT 9/20/2018  10:17 AM    Future Appointments  Date Time Provider Amalia Concepcion   9/24/2018 5:30 PM Ashwini Ortiz [facility]   9/26/2018 10:00 AM [facility]ela Dias, PT [facility]   10/1/2018 10:30 AM Sharron Nguyen PT [facility]   10/4/2018 11:00 AM Sharron Nguyen PT [facility]   10/9/2018 9:00 AM Dianna Lovelace, PT [facility]   10/11/2018 10:00 AM Dianna Lovelace PT [facility]   12/12/2018 10:00 AM Tae Morton MD [facility]

## 2018-09-24 ENCOUNTER — HOSPITAL ENCOUNTER (OUTPATIENT)
Dept: PHYSICAL THERAPY | Age: 78
Discharge: HOME | End: 2018-09-24
Payer: MEDICARE

## 2018-09-24 PROCEDURE — 97110 THERAPEUTIC EXERCISES: CPT

## 2018-09-24 PROCEDURE — 97140 MANUAL THERAPY 1/> REGIONS: CPT

## 2018-09-24 NOTE — PROGRESS NOTES
PT DAILY TREATMENT NOTE - [facility]     Patient Name: Shari Leal  Date:2018  : 1940  [x]  Patient  Verified  [place] / Plan: [place] & B / Product Type: Medicare /    In time:532 Out time:5:30  Total Treatment Time (min): 58  Total Timed Codes (min): 48  1:1 Treatment Time ( [address] only): 45  Visit #: 6 of 16    Treatment Area: Pain in right shoulder [M25.511]    SUBJECTIVE  Pain Level (0-10 scale): 3  Any medication changes, allergies to medications, adverse drug reactions, diagnosis change, or new procedure performed?: [x] No    [] Yes (see summary sheet for update)  Subjective functional status/changes:   [] No changes reported  Pt notes she went to the Dr with her  and Dr mentioned not hold anything heavier than a cell phone. Notes she was in a lot of pain after performing the bicep curls with the hand weight last visit.     OBJECTIVE     Modality rationale: decrease inflammation and decrease pain to improve the patients ability to increase ease with ADLs   Min Type Additional Details    [] Estim:  []Unatt       []IFC  []Premod                        []Other:  []w/ice   []w/heat  Position:  Location:    [] Estim: []Att    []TENS instruct  []NMES                    []Other:  []w/US   []w/ice   []w/heat  Position:  Location:    []  Traction: [] Cervical       []Lumbar                       [] Prone          []Supine                       []Intermittent   []Continuous Lbs:  [] before manual  [] after manual    []  Ultrasound: []Continuous   [] Pulsed                           []1MHz   []3MHz W/cm2:  Location:    []  Iontophoresis with dexamethasone         Location: [] Take home patch   [] In clinic   10 [x]  Ice     []  heat  []  Ice massage  []  Laser   []  Anodyne Position:reclined  Location:right shoulder    []  Laser with stim  []  Other:  Position:  Location:    []  Vasopneumatic Device Pressure:       [] lo [] med [] hi   Temperature: [] lo [] med [] hi   [] Skin assessment post-treatment:  []intact []redness- no adverse reaction    []redness - adverse reaction:       38 min Therapeutic Exercise:  [x] See flow sheet :   Rationale: increase ROM, increase strength and improve coordination to improve the patients ability to increase ease with ADls      10 min Manual Therapy:  Shoulder prom, grade 2-3 HG joint mobs, slight distraction   Rationale: decrease pain, increase ROM and increase tissue extensibility to increase ease with functional tasks          With   [] TE   [] TA   [] neuro   [] other: Patient Education: [x] Review HEP    [] Progressed/Changed HEP based on:   [] positioning   [] body mechanics   [] transfers   [] heat/ice application    [] other:           Pain Level (0-10 scale) post treatment: 2    ASSESSMENT/Changes in Function: had pt perform bicep curls without weight today secondary to complaints of intense pain last visit. Patient will continue to benefit from skilled PT services to modify and progress therapeutic interventions, address functional mobility deficits, address ROM deficits, address strength deficits, analyze and address soft tissue restrictions, analyze and cue movement patterns, analyze and modify body mechanics/ergonomics, assess and modify postural abnormalities, address imbalance/dizziness and instruct in home and community integration to attain remaining goals. [x]  See Plan of Care  []  See progress note/recertification  []  See Discharge Summary         Progress towards goals / Updated goals:  Short Term Goals: To be accomplished in 3 weeks:  1. Patient will subjectively report full compliance with prescribed HEP. Eval: HEP provided  Current: Met, HEP performance reported as prescribed, 9/7/2018  2. Patient will demonstrate right shoulder flexion and abduction PROM >/=160 degrees to improve ease with bathing. Eval: Right Shoulder Flexion  PROM 135 deg, Right Shoulder Abduction PROM 105 deg  3.  Patient will demonstrate right shoulder flexion and abduction AROM >/=140 degrees to improve ease with functional overhead ADLs. Eval; Right Shoulder Flexion AROM 80 deg, Right Shoulder Abduction AROM 48 deg  Current: Progressing, Right Shoulder Flexion AROM 132 deg, Right Shoulder Abduction AROM 100 deg, 9/17/2018      Long Term Goals: To be accomplished in 6 weeks:  1. Patient will demonstrate a significant functional improvement as demonstrated by a score of >/=61 on FOTO. Eval: FOTO = 50  Current:Progressing, FOTO=52  2. Patient will demonstrate right shoulder flexion, abduction and ER MMT >/=4+/5 to improve ease with overhead lifting. Eval: Flexion 2+/5, Abduction 2+/5, ER 4/5  3. Patient will demonstrate right shoulder functional ER >/=C4 and functional IR >/=L3 to improve ease with dressing.   Eval; Functional ER = Right Ear, Functional IR NT    PLAN  [x]  Upgrade activities as tolerated     [x]  Continue plan of care  []  Update interventions per flow sheet       []  Discharge due to:_  []  Other:_      Nury Chand 9/24/2018  5:37 PM    Future Appointments  Date Time Provider Amalia Concepcion   9/26/2018 10:00 AM Marcelina Sharma PT [facility] [address]   10/1/2018 10:30 AM Garrick Isaacs PT [facility] [address]   10/4/2018 11:00 AM Garrick Isaacs PT [facility] [address]   10/9/2018 9:00 AM Garrick Isaacs PT [facility] [address]   10/11/2018 10:00 AM Garrick Isaacs PT [facility] [address]   12/12/2018 10:00 AM Cm Harvey MD [facility]

## 2018-09-26 ENCOUNTER — HOSPITAL ENCOUNTER (OUTPATIENT)
Dept: PHYSICAL THERAPY | Age: 78
Discharge: HOME | End: 2018-09-26
Payer: MEDICARE

## 2018-09-26 PROCEDURE — 97140 MANUAL THERAPY 1/> REGIONS: CPT | Performed by: PHYSICAL THERAPIST

## 2018-09-26 PROCEDURE — 97110 THERAPEUTIC EXERCISES: CPT | Performed by: PHYSICAL THERAPIST

## 2018-09-26 RX ORDER — HYDROCHLOROTHIAZIDE 25 MG/1
TABLET ORAL
Qty: 90 TAB | Refills: 1 | Status: SHIPPED | OUTPATIENT
Start: 2018-09-26 | End: 2019-03-26 | Stop reason: SDUPTHER

## 2018-09-26 NOTE — PROGRESS NOTES
PT DAILY TREATMENT NOTE - [facility]     Patient Name: Umu Crump  Date:2018  : 1940  [x]  Patient  Verified  [place] / Plan: [place] & B / Product Type: Medicare /    In time:10:00  Out time:10:53  Total Treatment Time (min): 53  Total Timed Codes (min): 43  1:1 Treatment Time ( [address] only): 30   Visit #: 7 of 16    Treatment Area: Pain in right shoulder [M25.511]    SUBJECTIVE  Pain Level (0-10 scale): 1/10  Any medication changes, allergies to medications, adverse drug reactions, diagnosis change, or new procedure performed?: [x] No    [] Yes (see summary sheet for update)  Subjective functional status/changes:   [] No changes reported  Patient reports she is doing well, she is using her right arm more for normal ADLs. OBJECTIVE    Modality rationale: decrease inflammation and decrease pain to improve the patients ability to increase ease of motion to improve function.    Min Type Additional Details    [] Estim:  []Unatt       []IFC  []Premod                        []Other:  []w/ice   []w/heat  Position:  Location:    [] Estim: []Att    []TENS instruct  []NMES                    []Other:  []w/US   []w/ice   []w/heat  Position:  Location:    []  Traction: [] Cervical       []Lumbar                       [] Prone          []Supine                       []Intermittent   []Continuous Lbs:  [] before manual  [] after manual    []  Ultrasound: []Continuous   [] Pulsed                           []1MHz   []3MHz W/cm2:  Location:    []  Iontophoresis with dexamethasone         Location: [] Take home patch   [] In clinic   10 [x]  Ice     []  heat  []  Ice massage  []  Laser   []  Anodyne Position: sitting reclined  Location:right shoulder    []  Laser with stim  []  Other:  Position:  Location:    []  Vasopneumatic Device Pressure:       [] lo [] med [] hi   Temperature: [] lo [] med [] hi   [] Skin assessment post-treatment:  []intact []redness- no adverse reaction    []redness - adverse reaction:     33 min Therapeutic Exercise:  [] See flow sheet :   Rationale: increase ROM and increase strength to improve the patients ability to increase their functional activity level. 10 min Manual Therapy:  Grade 3 & 4 GH mobs    Rationale: increase ROM and increase tissue extensibility to increase ease of motion to improve function. With   [] TE   [] TA   [] neuro   [] other: Patient Education: [x] Review HEP    [] Progressed/Changed HEP based on:   [] positioning   [] body mechanics   [] transfers   [] heat/ice application    [] other:      Other Objective/Functional Measures: Passive motion with WFL. She has a springy end feel. She notes some point tenderness over the anterior aspect of the right GH joint, bicipital groove. Pain Level (0-10 scale) post treatment: 1/10    ASSESSMENT/Changes in Function: Patient with improved functional activity level. Patient will continue to benefit from skilled PT services to modify and progress therapeutic interventions, address functional mobility deficits, address ROM deficits, address strength deficits, analyze and address soft tissue restrictions, analyze and cue movement patterns, analyze and modify body mechanics/ergonomics, assess and modify postural abnormalities and instruct in home and community integration to attain remaining goals. [x]  See Plan of Care  []  See progress note/recertification  []  See Discharge Summary         Progress towards goals / Updated goals:  Short Term Goals: To be accomplished in 3 weeks:  1. Patient will subjectively report full compliance with prescribed HEP. Eval: HEP provided  Current: Met, HEP performance reported as prescribed, 9/7/2018  2. Patient will demonstrate right shoulder flexion and abduction PROM >/=160 degrees to improve ease with bathing. Eval: Right Shoulder Flexion  PROM 135 deg, Right Shoulder Abduction PROM 105 deg  3.  Patient will demonstrate right shoulder flexion and abduction AROM >/=140 degrees to improve ease with functional overhead ADLs. Eval; Right Shoulder Flexion AROM 80 deg, Right Shoulder Abduction AROM 48 deg  Current: Progressing, Right Shoulder Flexion AROM 132 deg, Right Shoulder Abduction AROM 100 deg, 9/17/2018      Long Term Goals: To be accomplished in 6 weeks:  1. Patient will demonstrate a significant functional improvement as demonstrated by a score of >/=61 on FOTO. Eval: FOTO = 50  Current:Progressing, FOTO=52  2. Patient will demonstrate right shoulder flexion, abduction and ER MMT >/=4+/5 to improve ease with overhead lifting. Eval: Flexion 2+/5, Abduction 2+/5, ER 4/5  3. Patient will demonstrate right shoulder functional ER >/=C4 and functional IR >/=L3 to improve ease with dressing.   Eval; Functional ER = Right Ear, Functional IR NT    PLAN  [x]  Upgrade activities as tolerated     [x]  Continue plan of care  []  Update interventions per flow sheet       []  Discharge due to:_  []  Other:_      Fadi Haider PT 9/26/2018  10:14 AM    Future Appointments  Date Time Provider Amalia Concepcion   10/1/2018 10:30 AM Clide Poser, PT [facility]   10/4/2018 11:00 AM Clide Poser, PT [facility]   10/9/2018 9:00 AM Clide Poser, PT [facility]   10/11/2018 10:00 AM Clide Poser, PT [facility]   12/12/2018 10:00 AM Carina Shah MD [facility]

## 2018-10-01 ENCOUNTER — HOSPITAL ENCOUNTER (OUTPATIENT)
Dept: PHYSICAL THERAPY | Age: 78
Discharge: HOME | End: 2018-10-01
Payer: MEDICARE

## 2018-10-01 PROCEDURE — 97140 MANUAL THERAPY 1/> REGIONS: CPT

## 2018-10-01 PROCEDURE — 97110 THERAPEUTIC EXERCISES: CPT

## 2018-10-01 PROCEDURE — 97112 NEUROMUSCULAR REEDUCATION: CPT

## 2018-10-01 NOTE — PROGRESS NOTES
PT DAILY TREATMENT NOTE - [facility]     Patient Name: Iwona Parry  Date:10/1/2018  : 1940  [x]  Patient  Verified  [place] / Plan: [place] & B / Product Type: Medicare /    In BHCF:0782  Out time:1126  Total Treatment Time (min): 55  Total Timed Codes (min): 45  1:1 Treatment Time ( only): 45   Visit #: 8 of 16    Treatment Area: Pain in right shoulder [M25.511]    SUBJECTIVE  Pain Level (0-10 scale): 1  Any medication changes, allergies to medications, adverse drug reactions, diagnosis change, or new procedure performed?: [x] No    [] Yes (see summary sheet for update)  Subjective functional status/changes:   [] No changes reported  Patient reports greatest difficulty with placement of the hand behind the back (functional IR)    OBJECTIVE    Modality rationale: decrease inflammation and decrease pain to improve the patients ability to improve ease with sleep   Min Type Additional Details   10 [x]  Ice     []  heat  []  Ice massage Position: Supine  Location: Right Shoulder, Post-Tx     20 min Therapeutic Exercise:  [x] See flow sheet : Emphasis placed on improving available shoulder AROM and strength and promotion of right shoulder PROM/AAROM   Rationale: increase ROM and increase strength to improve the patients ability to improve ease with self-care ADLs      10 min Manual Therapy:    Supine, Right Shoulder Physiological Flexion Grade II-III Mobilization  Supine, Right Shoulder Physiological Scaption Grade II-III Mobilization  Supine, Right Shoulder Physiological Abduction Grade II-III Mobilization  Supine, Right Shoulder Physiological ER (90 deg abduction) Grade II-III Mobilization   Rationale: decrease pain, increase ROM and increase tissue extensibility to improve ease with functional ADLs                                    15 min Neuromuscular Re-education:  [x]  See flow sheet: Emphasis placed on promotion of activation and recruitment of the scapulothoracic and glenohumeral musculature   Rationale: increase ROM and increase strength  to improve the patients ability to improve ease with overhead lifting      With   [] TE   [] TA   [] neuro   [] other: Patient Education: [x] Review HEP    [] Progressed/Changed HEP based on:   [] positioning   [] body mechanics   [] transfers   [] heat/ice application    [] other:      Pain Level (0-10 scale) post treatment: 0    ASSESSMENT/Changes in Function: Further progressed right shoulder AAROM and AROM exercises with good tolerance with patient objectively demonstrating continued appropriate progression of shoulder AROM and strength. Patient provided with updated HEP to allow for independent progression. Patient will continue to benefit from skilled PT services to modify and progress therapeutic interventions, address functional mobility deficits, address ROM deficits, address strength deficits, analyze and address soft tissue restrictions, analyze and cue movement patterns, analyze and modify body mechanics/ergonomics and assess and modify postural abnormalities to attain remaining goals. []  See Plan of Care  []  See progress note/recertification  []  See Discharge Summary         Progress towards goals / Updated goals:    Short Term Goals: To be accomplished in 3 weeks:  1. Patient will subjectively report full compliance with prescribed HEP. Eval: HEP provided  Current: Met, HEP performance reported as prescribed, 9/7/2018  2. Patient will demonstrate right shoulder flexion and abduction PROM >/=160 degrees to improve ease with bathing. Eval: Right Shoulder Flexion  PROM 135 deg, Right Shoulder Abduction PROM 105 deg  Current: Progressing, Right Shoulder Flexion  PROM 160 deg, Right Shoulder Abduction PROM 145 deg, 10/1/2018  3. Patient will demonstrate right shoulder flexion and abduction AROM >/=140 degrees to improve ease with functional overhead ADLs.   Eval; Right Shoulder Flexion AROM 80 deg, Right Shoulder Abduction AROM 48 deg  Current: Progressing, Right Shoulder Flexion AROM 135 deg, Right Shoulder Abduction AROM 120 deg, 10/1/2018      Long Term Goals: To be accomplished in 6 weeks:  1. Patient will demonstrate a significant functional improvement as demonstrated by a score of >/=61 on FOTO. Eval: FOTO = 50  Current:Progressing, FOTO=52  2. Patient will demonstrate right shoulder flexion, abduction and ER MMT >/=4+/5 to improve ease with overhead lifting. Eval: Flexion 2+/5, Abduction 2+/5, ER 4/5  3. Patient will demonstrate right shoulder functional ER >/=C4 and functional IR >/=L3 to improve ease with dressing.   Eval; Functional ER = Right Ear, Functional IR NT  Current: Progressing, Functional ER C2, Functional IR Right SIJ, 10/1/2018    PLAN  [x]  Upgrade activities as tolerated     [x]  Continue plan of care  []  Update interventions per flow sheet       []  Discharge due to:_  []  Other:_      Sandra Moss PT 10/1/2018  7:24 AM    Future Appointments  Date Time Provider Amalia Concepcion   10/1/2018 10:30 AM Sandra Moss PT [facility]   10/4/2018 11:00 AM Sandra Moss PT [facility]   10/9/2018 9:00 AM Sandra Moss PT [facility]   10/11/2018 10:00 AM Sandra Moss PT [facility]   12/12/2018 10:00 AM Ann Hare MD [facility]

## 2018-10-04 ENCOUNTER — HOSPITAL ENCOUNTER (OUTPATIENT)
Dept: PHYSICAL THERAPY | Age: 78
Discharge: HOME | End: 2018-10-04
Payer: MEDICARE

## 2018-10-04 PROCEDURE — G8984 CARRY CURRENT STATUS: HCPCS

## 2018-10-04 PROCEDURE — G8985 CARRY GOAL STATUS: HCPCS

## 2018-10-04 PROCEDURE — 97140 MANUAL THERAPY 1/> REGIONS: CPT

## 2018-10-04 PROCEDURE — 97112 NEUROMUSCULAR REEDUCATION: CPT

## 2018-10-04 NOTE — PROGRESS NOTES
PT DAILY TREATMENT NOTE - [facility]     Patient Name: Monika Escobar  Date:10/4/2018  : 1940  [x]  Patient  Verified  [place] / Plan: [place] & B / Product Type: Medicare /    In time:1101  Out time:1155  Total Treatment Time (min): 54  Total Timed Codes (min): 25  1:1 Treatment Time (MC only): 44   Visit #: 9 of 16    Treatment Area: Pain in right shoulder [M25.511]    SUBJECTIVE  Pain Level (0-10 scale): 1.5   Any medication changes, allergies to medications, adverse drug reactions, diagnosis change, or new procedure performed?: [x] No    [] Yes (see summary sheet for update)  Subjective functional status/changes:   [] No changes reported  Patient reports increased achiness of the right shoulder yesterday with relief today.     OBJECTIVE    Modality rationale: decrease inflammation and decrease pain to improve the patients ability to improve ease with sleep   Min Type Additional Details   10 [x]  Ice     []  heat  []  Ice massage Position: Reclined  Location: Right Shoulder, Post-Tx     20 min Therapeutic Exercise:  [x] See flow sheet : Emphasis placed on improving available shoulder AROM and strength and promotion of right shoulder PROM/AAROM   Rationale: increase ROM and increase strength to improve the patients ability to improve ease with self-care ADLs      10 min Manual Therapy:    Supine, Right Shoulder Physiological Flexion Grade II-III Mobilization  Supine, Right Shoulder Physiological Scaption Grade II-III Mobilization  Supine, Right Shoulder Physiological Abduction Grade II-III Mobilization  Supine, Right Shoulder Physiological ER (90 deg abduction) Grade II-III Mobilization  Supine, Right GH Inferior Grade I-II Mobilization (OPP)   Rationale: decrease pain, increase ROM and increase tissue extensibility to improve ease with functional ADLs      14 min Neuromuscular Re-education:  [x]  See flow sheet: Emphasis placed on promotion of activation and recruitment of the scapulothoracic and glenohumeral musculature   Rationale: increase ROM and increase strength  to improve the patients ability to improve ease with overhead lifting         With   [] TE   [] TA   [] neuro   [] other: Patient Education: [x] Review HEP    [] Progressed/Changed HEP based on:   [] positioning   [] body mechanics   [] transfers   [] heat/ice application    [] other:      Pain Level (0-10 scale) post treatment: 0    ASSESSMENT/Changes in Function: Patient continues to demonstrate appropriate post-surgical progression with caution having been taken with exercise progression secondary to massive rotator cuff tear. Patient demonstrates right shoulder AROM as follow: Flexion 135 deg, Abduction 120 deg, Functional ER C2, Functional IR Right SIJ. Patient subjectively reports improved functional ease with use of the right UE with completion of self-care and household ADLs. Patient would benefit from continuation of PT services    Patient will continue to benefit from skilled PT services to modify and progress therapeutic interventions, address functional mobility deficits, address ROM deficits, address strength deficits, analyze and address soft tissue restrictions, analyze and cue movement patterns and analyze and modify body mechanics/ergonomics to attain remaining goals. []  See Plan of Care  []  See progress note/recertification  []  See Discharge Summary         Progress towards goals / Updated goals:    Short Term Goals: To be accomplished in 3 weeks:  1. Patient will subjectively report full compliance with prescribed HEP. Eval: HEP provided  Current: Met, HEP performance reported as prescribed, 9/7/2018  2. Patient will demonstrate right shoulder flexion and abduction PROM >/=160 degrees to improve ease with bathing.   Eval: Right Shoulder Flexion  PROM 135 deg, Right Shoulder Abduction PROM 105 deg  Current: Progressing, Right Shoulder Flexion  PROM 160 deg, Right Shoulder Abduction PROM 145 deg, 10/1/2018  3. Patient will demonstrate right shoulder flexion and abduction AROM >/=140 degrees to improve ease with functional overhead ADLs. Eval; Right Shoulder Flexion AROM 80 deg, Right Shoulder Abduction AROM 48 deg  Current: Progressing, Right Shoulder Flexion AROM 135 deg, Right Shoulder Abduction AROM 120 deg, 10/1/2018      Long Term Goals: To be accomplished in 6 weeks:  1. Patient will demonstrate a significant functional improvement as demonstrated by a score of >/=61 on FOTO. Eval: FOTO = 50  Current: Progressing, FOTO = 52, 9/24/2018  2. Patient will demonstrate right shoulder flexion, abduction and ER MMT >/=4+/5 to improve ease with overhead lifting. Eval: Flexion 2+/5, Abduction 2+/5, ER 4/5  Current: Progressing,  Flexion 3+/5, Abduction 3+/5, ER 4/5, 10/4/2018  3. Patient will demonstrate right shoulder functional ER >/=C4 and functional IR >/=L3 to improve ease with dressing.   Eval; Functional ER = Right Ear, Functional IR NT  Current: Progressing, Functional ER C2, Functional IR Right SIJ, 10/1/2018    PLAN  [x]  Upgrade activities as tolerated     [x]  Continue plan of care  []  Update interventions per flow sheet       []  Discharge due to:_  []  Other:_      Melissa Bingham PT 10/4/2018  7:56 AM    Future Appointments  Date Time Provider Amalia Concepcion   10/4/2018 11:00 AM Melissa Bingham PT [facility]   10/9/2018 9:00 AM Melissa Binhgam PT [facility]   10/11/2018 10:00 AM Melissa Bingham PT [facility]   10/12/2018 11:20 AM [facility] NURSE VISIT [facility] SATURNINO WYATT   12/12/2018 10:00 AM Kate Tong MD [facility]

## 2018-10-04 NOTE — PROGRESS NOTES
[facility] - [facility]              [address]        [place], [address] [place]   [phone] fax    Medicare Progress Report    Patient name: Destiny Chin Start of Care: 2018   Referral source: Hugo Mann* : 1940   Medical/Treatment Diagnosis: Pain in right shoulder [M25.511] Onset Date:DoS 2018     Prior Hospitalization: see medical history Provider#: [id number]   Medications: Verified on Patient Summary List    Comorbidities: Arthritis, Osteoporosis, Right CMC arthritis, Back Pain   Prior Level of Function: RHD, (I) Functional ADLs, (I) Driving, Retired, No previous cervical nor shoulder injuries  Visits from Start of Care: 9    Missed Visits: 0    Reporting Period: 2018 to 10/4/2018    Subjective Reports:     Short Term Goals: To be accomplished in 3 weeks:  1. Patient will subjectively report full compliance with prescribed HEP. Eval: HEP provided  At PN: Met, HEP performance reported as prescribed  2. Patient will demonstrate right shoulder flexion and abduction PROM >/=160 degrees to improve ease with bathing. Eval: Right Shoulder Flexion  PROM 135 deg, Right Shoulder Abduction PROM 105 deg  At PN: Progressing, Right Shoulder Flexion  PROM 160 deg, Right Shoulder Abduction PROM 145 deg  3. Patient will demonstrate right shoulder flexion and abduction AROM >/=140 degrees to improve ease with functional overhead ADLs. Eval; Right Shoulder Flexion AROM 80 deg, Right Shoulder Abduction AROM 48 deg  At PN: Progressing, Right Shoulder Flexion AROM 135 deg, Right Shoulder Abduction AROM 120 deg      Long Term Goals: To be accomplished in 6 weeks:  1. Patient will demonstrate a significant functional improvement as demonstrated by a score of >/=61 on FOTO. Eval: FOTO = 50  At PN: Progressing, FOTO = 52  2. Patient will demonstrate right shoulder flexion, abduction and ER MMT >/=4+/5 to improve ease with overhead lifting.   Eval: Flexion 2+/5, Abduction 2+/5, ER 4/5  At PN: Progressing,  Flexion 3+/5, Abduction 3+/5, ER 4/5  3. Patient will demonstrate right shoulder functional ER >/=C4 and functional IR >/=L3 to improve ease with dressing. Eval; Functional ER = Right Ear, Functional IR NT  At PN: Progressing, Functional ER C2, Functional IR Right SIJ    Key functional changes: See above goals. Problems/ barriers to goal attainment: None. Assessment / Recommendations:    Patient continues to demonstrate appropriate post-surgical progression with caution having been taken with exercise progression secondary to massive rotator cuff tear. Patient demonstrates right shoulder AROM as follow: Flexion 135 deg, Abduction 120 deg, Functional ER C2, Functional IR Right SIJ. Patient subjectively reports improved functional ease with use of the right UE with completion of self-care and household ADLs. Patient would benefit from continuation of PT services     Patient will continue to benefit from skilled PT services to modify and progress therapeutic interventions, address functional mobility deficits, address ROM deficits, address strength deficits, analyze and address soft tissue restrictions, analyze and cue movement patterns and analyze and modify body mechanics/ergonomics to attain remaining goals. Problem List: pain affecting function, decrease ROM, decrease strength, decrease ADL/ functional abilitiies, decrease activity tolerance, decrease flexibility/ joint mobility and decrease transfer abilities   Treatment Plan: Therapeutic exercise, Therapeutic activities, Neuromuscular re-education, Physical agent/modality, Manual therapy, Patient education, Self Care training, Functional mobility training and Home safety training    Updated Goals: Continue with unmet goals above.     Frequency / Duration: Patient to be seen 2 times per week for 5 weeks:    G-Codes (GP)  Carry   Current  CK= 40-59%    Goal  CJ= 20-39%    The severity rating is based on clinical judgment and the FOTO score.       Nori Ramos, LUIS MANUEL 10/4/2018 7:57 AM

## 2018-10-09 ENCOUNTER — HOSPITAL ENCOUNTER (OUTPATIENT)
Dept: PHYSICAL THERAPY | Age: 78
Discharge: HOME | End: 2018-10-09
Payer: MEDICARE

## 2018-10-09 PROCEDURE — 97110 THERAPEUTIC EXERCISES: CPT

## 2018-10-09 PROCEDURE — 97140 MANUAL THERAPY 1/> REGIONS: CPT

## 2018-10-09 PROCEDURE — 97112 NEUROMUSCULAR REEDUCATION: CPT

## 2018-10-09 NOTE — PROGRESS NOTES
PT DAILY TREATMENT NOTE - [facility]     Patient Name: Rylee Cristobal  Date:10/9/2018  : 1940  [x]  Patient  Verified  Payor: Tiesha Nakul / Plan: [place] & B / Product Type: Medicare /    In time:901  Out time:956  Total Treatment Time (min): 55  Total Timed Codes (min): 45  1:1 Treatment Time ( only): 38   Visit #: 10 of 16    Treatment Area: Pain in right shoulder [M25.511]    SUBJECTIVE  Pain Level (0-10 scale): 1  Any medication changes, allergies to medications, adverse drug reactions, diagnosis change, or new procedure performed?: [x] No    [] Yes (see summary sheet for update)  Subjective functional status/changes:   [] No changes reported  Patient reports MD follow up with MD reporting appropriate post-surgical improvement in ROM but continued deficits in strength    OBJECTIVE    Modality rationale: decrease inflammation and decrease pain to improve the patients ability to improve ease with sleep   Min Type Additional Details   10 [x]  Ice     []  heat  []  Ice massage Position: Reclined  Location: Right Shoulder, Post-Tx     15 min Therapeutic Exercise:  [x] See flow sheet : Emphasis placed on improving available shoulder AROM and strength and promotion of right shoulder PROM/AAROM   Rationale: increase ROM and increase strength to improve the patients ability to improve ease with self-care ADLs      10 min Manual Therapy:    Supine, Right Shoulder Physiological Flexion Grade II-III Mobilization  Supine, Right Shoulder Physiological Scaption Grade II-III Mobilization  Supine, Right Shoulder Physiological Abduction Grade II-III Mobilization  Supine, Right GH Inferior Grade I-II Mobilization (OPP)  Supine, Right GH AP Grade I-II Mobilization (OPP)   Rationale: decrease pain, increase ROM and increase tissue extensibility to improve ease with functional ADLs      20 min Neuromuscular Re-education:  [x]  See flow sheet: Emphasis placed on promotion of activation and recruitment of the scapulothoracic and glenohumeral musculature   Rationale: increase ROM and increase strength  to improve the patients ability to improve ease with overhead lifting        With   [] TE   [] TA   [] neuro   [] other: Patient Education: [x] Review HEP    [] Progressed/Changed HEP based on:   [] positioning   [] body mechanics   [] transfers   [] heat/ice application    [] other:      Pain Level (0-10 scale) post treatment: 0    ASSESSMENT/Changes in Function: Continue to progress strengthening exercises to promote improved functional ease with utilization of the right UE with ADLs. Patient with continued limitations with progression of resistance utilized with right shoulder strengthening secondary to pain experienced. Patient will continue to benefit from skilled PT services to modify and progress therapeutic interventions, address functional mobility deficits, address ROM deficits, address strength deficits, analyze and address soft tissue restrictions and analyze and cue movement patterns to attain remaining goals. []  See Plan of Care  []  See progress note/recertification  []  See Discharge Summary         Progress towards goals / Updated goals:    Short Term Goals: To be accomplished in 3 weeks:  1. Patient will subjectively report full compliance with prescribed HEP. At PN: Met, HEP performance reported as prescribed, 9/7/2018  2. Patient will demonstrate right shoulder flexion and abduction PROM >/=160 degrees to improve ease with bathing. At PN: Progressing, Right Shoulder Flexion  PROM 160 deg, Right Shoulder Abduction PROM 145 deg, 10/1/2018  Current: Remains, Right Shoulder Flexion  PROM 160 deg, Right Shoulder Abduction PROM 145 deg, 10/9/2018  3. Patient will demonstrate right shoulder flexion and abduction AROM >/=140 degrees to improve ease with functional overhead ADLs.   At PN: Progressing, Right Shoulder Flexion AROM 135 deg, Right Shoulder Abduction AROM 120 deg, 10/1/2018      Long Term Goals: To be accomplished in 6 weeks:  1. Patient will demonstrate a significant functional improvement as demonstrated by a score of >/=61 on FOTO. At PN: Lebron Griggs = 52, 9/24/2018  2. Patient will demonstrate right shoulder flexion, abduction and ER MMT >/=4+/5 to improve ease with overhead lifting. At PN: Progressing,  Flexion 3+/5, Abduction 3+/5, ER 4/5, 10/4/2018  3. Patient will demonstrate right shoulder functional ER >/=C4 and functional IR >/=L3 to improve ease with dressing.   At PN: Progressing, Functional ER C2, Functional IR Right SIJ, 10/1/2018    PLAN  [x]  Upgrade activities as tolerated     [x]  Continue plan of care  []  Update interventions per flow sheet       []  Discharge due to:_  []  Other:_      Javi Dukes PT 10/9/2018  8:21 AM    Future Appointments  Date Time Provider Amalia Concepcion   10/9/2018 9:00 AM [facility]ukes PT [facility]   10/11/2018 10:00 AM Javi Dukes PT [facility]   10/12/2018 11:20 AM [facility] NURSE VISIT [facility] WYATT   12/12/2018 10:00 AM Annette Strong MD [facility]

## 2018-10-11 ENCOUNTER — HOSPITAL ENCOUNTER (OUTPATIENT)
Dept: PHYSICAL THERAPY | Age: 78
Discharge: HOME | End: 2018-10-11
Payer: MEDICARE

## 2018-10-11 PROCEDURE — 97140 MANUAL THERAPY 1/> REGIONS: CPT

## 2018-10-11 PROCEDURE — 97112 NEUROMUSCULAR REEDUCATION: CPT

## 2018-10-11 NOTE — PROGRESS NOTES
PT DAILY TREATMENT NOTE - [facility]     Patient Name: Payal Cazares  Date:10/11/2018  : 1940  [x]  Patient  Verified  Payor: Peyman Alonsoings / Plan: [place] / Product Type: Medicare /    In time:1000  Out time:1056  Total Treatment Time (min): 56  Total Timed Codes (min): 46  1:1 Treatment Time ( [address] only): 30   Visit #: 11 of 16    Treatment Area: Pain in right shoulder [M25.511]    SUBJECTIVE  Pain Level (0-10 scale): 2  Any medication changes, allergies to medications, adverse drug reactions, diagnosis change, or new procedure performed?: [x] No    [] Yes (see summary sheet for update)  Subjective functional status/changes:   [] No changes reported  Patient reports non-compliance with HEP over last two days due to hectic schedule.      OBJECTIVE    Modality rationale: decrease inflammation and decrease pain to improve the patients ability to improve ease with sleep   Min Type Additional Details   10 [x]  Ice     []  heat  []  Ice massage Position: Reclined  Location: Right Shoulder, Post-Tx     20 min Therapeutic Exercise:  [x] See flow sheet : Emphasis placed on improving available shoulder AROM and strength and promotion of right shoulder PROM/AAROM   Rationale: increase ROM and increase strength to improve the patients ability to improve ease with self-care ADLs      8 min Manual Therapy:    Supine, Right Shoulder Physiological Flexion Grade II-III Mobilization  Supine, Right Shoulder Physiological Scaption Grade II-III Mobilization  Supine, Right Shoulder Physiological Abduction Grade II-III Mobilization  Supine, Right GH Inferior Grade I-II Mobilization (OPP)  Supine, Right Shoulder Multi-Directional Rhythmic Stabilization (90 deg flexion, scapular protraction - at wrist)  Supine, Right UE PNF D2 Rhythmic Initiation   Rationale: decrease pain, increase ROM and increase tissue extensibility to improve ease with functional ADLs      18 min Neuromuscular Re-education:  [x]  See flow sheet: Emphasis placed on promotion of activation and recruitment of the scapulothoracic and glenohumeral musculature   Rationale: increase ROM and increase strength  to improve the patients ability to improve ease with overhead lifting        With   [] TE   [] TA   [] neuro   [] other: Patient Education: [x] Review HEP    [] Progressed/Changed HEP based on:   [] positioning   [] body mechanics   [] transfers   [] heat/ice application    [] other:      Pain Level (0-10 scale) post treatment: 0    ASSESSMENT/Changes in Function: Initiated utilization of multi-directional UE rhythmic stabilizations to promote improved scapulohumeral rhythm with UE elevation. Patient noted with continued poor activation of the rotator cuff musculature as demonstrated by continued difficulty with completion of sidelying shoulder abduction with resistance. Patient will continue to benefit from skilled PT services to modify and progress therapeutic interventions, address functional mobility deficits, address ROM deficits, address strength deficits, analyze and address soft tissue restrictions, analyze and cue movement patterns and analyze and modify body mechanics/ergonomics to attain remaining goals. []  See Plan of Care  []  See progress note/recertification  []  See Discharge Summary         Progress towards goals / Updated goals:    Short Term Goals: To be accomplished in 3 weeks:  1. Patient will subjectively report full compliance with prescribed HEP. At PN: Met, HEP performance reported as prescribed, 9/7/2018  2. Patient will demonstrate right shoulder flexion and abduction PROM >/=160 degrees to improve ease with bathing. At PN: Progressing, Right Shoulder Flexion  PROM 160 deg, Right Shoulder Abduction PROM 145 deg, 10/1/2018  Current: Remains, Right Shoulder Flexion  PROM 160 deg, Right Shoulder Abduction PROM 145 deg, 10/9/2018  3.  Patient will demonstrate right shoulder flexion and abduction AROM >/=140 degrees to improve ease with functional overhead ADLs. At PN: Progressing, Right Shoulder Flexion AROM 135 deg, Right Shoulder Abduction AROM 120 deg, 10/1/2018      Long Term Goals: To be accomplished in 6 weeks:  1. Patient will demonstrate a significant functional improvement as demonstrated by a score of >/=61 on FOTO. At PN: Dhiraj Méndez = 52, 9/24/2018  2. Patient will demonstrate right shoulder flexion, abduction and ER MMT >/=4+/5 to improve ease with overhead lifting. At PN: Progressing,  Flexion 3+/5, Abduction 3+/5, ER 4/5, 10/4/2018  3. Patient will demonstrate right shoulder functional ER >/=C4 and functional IR >/=L3 to improve ease with dressing.   At PN: Progressing, Functional ER C2, Functional IR Right SIJ, 10/1/2018    PLAN  [x]  Upgrade activities as tolerated     [x]  Continue plan of care  []  Update interventions per flow sheet       []  Discharge due to:_  []  Other:_      Mimi Patel, PT 10/11/2018  7:52 AM    Future Appointments  Date Time Provider Amalia Concepcion   10/11/2018 10:00 AM Mimi Patel, PT [facility]   10/12/2018 11:20 AM [facility] NURSE VISIT [facility]   10/18/2018 11:00 AM Mimi Patel PT [facility]   10/23/2018 11:30 AM [facility] Patel, [facility]   10/25/2018 12:00 PM Mimi Patel PT [facility]   10/29/2018 10:30 AM Mimi Patel PT [facility]   12/12/2018 10:00 AM Osiris Cleaning MD [facility]

## 2018-10-12 ENCOUNTER — CLINICAL SUPPORT (OUTPATIENT)
Dept: INTERNAL MEDICINE CLINIC | Age: 78
End: 2018-10-12

## 2018-10-12 DIAGNOSIS — Z23 ENCOUNTER FOR IMMUNIZATION: ICD-10-CM

## 2018-10-18 ENCOUNTER — HOSPITAL ENCOUNTER (OUTPATIENT)
Dept: PHYSICAL THERAPY | Age: 78
Discharge: HOME | End: 2018-10-18
Payer: MEDICARE

## 2018-10-18 PROCEDURE — 97110 THERAPEUTIC EXERCISES: CPT

## 2018-10-18 PROCEDURE — 97140 MANUAL THERAPY 1/> REGIONS: CPT

## 2018-10-18 NOTE — PROGRESS NOTES
PT DAILY TREATMENT NOTE - [facility]     Patient Name: Suha Messer  Date:10/18/2018  : 1940  [x]  Patient  Verified  [place] / Plan: [place] & B / Product Type: Medicare /    In time:1104  Out time:1137  Total Treatment Time (min): 33  Total Timed Codes (min): 23  1:1 Treatment Time ( only): 23   Visit #: 12 of 16    Treatment Area: Pain in right shoulder [M25.511]    SUBJECTIVE  Pain Level (0-10 scale): 4  Any medication changes, allergies to medications, adverse drug reactions, diagnosis change, or new procedure performed?: [x] No    [] Yes (see summary sheet for update)  Subjective functional status/changes:   [] No changes reported  Patient reports severe anterior shoulder pain with onset Dave evening without known MOON. OBJECTIVE    Modality rationale: decrease inflammation and decrease pain to improve the patients ability to improve ease with sleep   Min Type Additional Details   10 [x]  Ice     []  heat  []  Ice massage Position: Reclined  Location: Right Shoulder, Post-Tx     8 min Therapeutic Exercise:  [x] See flow sheet : Secondary to high pain intensity exercises deferred at this time with patient educated in updated temporary HEP to improve and promote symptom relief.    Rationale: increase ROM and increase strength to improve the patients ability to improve ease with self-care ADLs      15 min Manual Therapy:    Supine, Right Shoulder Physiological Lateral Grade II-III Mobilization  Supine, Right Shoulder Physiological Scaption Grade II-III Mobilization  Supine, Right Shoulder Physiological Abduction Grade II-III Mobilization  Supine, Right GH Inferior Grade I-II Mobilization (OPP)  Left Sidelying, Right Pectoralis Manual Stretch   Rationale: decrease pain, increase ROM and increase tissue extensibility to improve ease with functional ADLs          With   [] TE   [] TA   [] neuro   [] other: Patient Education: [x] Review HEP    [] Progressed/Changed HEP based on:   [] positioning   [] body mechanics   [] transfers   [] heat/ice application    [] other:       Pain Level (0-10 scale) post treatment: 2    ASSESSMENT/Changes in Function: Secondary to patient presenting with increased pain with TTP noted to the proximal aspect of the biceps tendon exercise completion deferred with emphasis of within clinic treatment placed on promoting improved anterior shoulder complex flexibility. Patient provided with updated HEP and educated to continue to monitor symptoms independently with use of ice for symptom relief. To assess symptoms next treatment session with determination regarding reinitiating of exercises pending patient response. Patient will continue to benefit from skilled PT services to modify and progress therapeutic interventions, address functional mobility deficits, address ROM deficits, analyze and address soft tissue restrictions, analyze and cue movement patterns, analyze and modify body mechanics/ergonomics and assess and modify postural abnormalities to attain remaining goals. []  See Plan of Care  []  See progress note/recertification  []  See Discharge Summary         Progress towards goals / Updated goals:    Short Term Goals: To be accomplished in 3 weeks:  1. Patient will subjectively report full compliance with prescribed HEP. At PN: Met, HEP performance reported as prescribed, 9/7/2018  2. Patient will demonstrate right shoulder flexion and abduction PROM >/=160 degrees to improve ease with bathing. At PN: Progressing, Right Shoulder Flexion  PROM 160 deg, Right Shoulder Abduction PROM 145 deg, 10/1/2018  Current: Remains, Right Shoulder Flexion  PROM 160 deg, Right Shoulder Abduction PROM 145 deg, 10/9/2018  3. Patient will demonstrate right shoulder flexion and abduction AROM >/=140 degrees to improve ease with functional overhead ADLs.   At PN: Progressing, Right Shoulder Flexion AROM 135 deg, Right Shoulder Abduction AROM 120 deg, 10/1/2018  Current: Met, Right Shoulder Flexion AROM 145 deg, Right Shoulder Abduction AROM 140 deg, 10/18/2018      Long Term Goals: To be accomplished in 6 weeks:  1. Patient will demonstrate a significant functional improvement as demonstrated by a score of >/=61 on FOTO. At PN: Veronica Gee = 52, 9/24/2018  2. Patient will demonstrate right shoulder flexion, abduction and ER MMT >/=4+/5 to improve ease with overhead lifting. At PN: Progressing,  Flexion 3+/5, Abduction 3+/5, ER 4/5, 10/4/2018  3. Patient will demonstrate right shoulder functional ER >/=C4 and functional IR >/=L3 to improve ease with dressing.   At PN: Progressing, Functional ER C2, Functional IR Right SIJ, 10/1/2018    PLAN  [x]  Upgrade activities as tolerated     [x]  Continue plan of care  []  Update interventions per flow sheet       []  Discharge due to:_  []  Other:_      Ana Pratt PT 10/18/2018  8:00 AM    Future Appointments   Date Time Provider Amalia Concepcion   10/18/2018 11:00 AM Armando Soda, PT [facility]   10/23/2018 11:30 AM Armando Soda, PT [facility]   10/25/2018 12:00 PM Armando Soda, PT [facility]   10/29/2018 10:30 AM Armando Soda, PT [facility]   12/12/2018 10:00 AM Steven Dao MD [facility]

## 2018-10-23 ENCOUNTER — HOSPITAL ENCOUNTER (OUTPATIENT)
Dept: PHYSICAL THERAPY | Age: 78
Discharge: HOME | End: 2018-10-23
Payer: MEDICARE

## 2018-10-23 PROCEDURE — 97140 MANUAL THERAPY 1/> REGIONS: CPT

## 2018-10-23 PROCEDURE — 97112 NEUROMUSCULAR REEDUCATION: CPT

## 2018-10-23 PROCEDURE — 97110 THERAPEUTIC EXERCISES: CPT

## 2018-10-23 NOTE — PROGRESS NOTES
PT DAILY TREATMENT NOTE - [facility]     Patient Name: Ji Vaughn  Date:10/23/2018  : 1940  [x]  Patient  Verified  [place] / Plan: [place] & B / Product Type: Medicare /    In time:1129  Out time:1224  Total Treatment Time (min): 55  Total Timed Codes (min): 45  1:1 Treatment Time ( [address] only): 38   Visit #: 13 of 16    Treatment Area: Pain in right shoulder [M25.511]    SUBJECTIVE  Pain Level (0-10 scale): 1  Any medication changes, allergies to medications, adverse drug reactions, diagnosis change, or new procedure performed?: [x] No    [] Yes (see summary sheet for update)  Subjective functional status/changes:   [] No changes reported  Patients reports overall reduction in pain since last treatment session.     OBJECTIVE    Modality rationale: decrease inflammation and decrease pain to improve the patients ability to improve ease with sleep   Min Type Additional Details    10 [x]  Ice     []  heat  []  Ice massage Position: Reclined  Location: Right Shoulder, Post-Tx       17 min Therapeutic Exercise:  [x] See flow sheet : Emphasis placed on improving available shoulder AROM and strength and promotion of right shoulder PROM/AAROM   Rationale: increase ROM and increase strength to improve the patients ability to improve ease with self-care ADLs      8 min Manual Therapy:    Supine, Right Shoulder Physiological Flexion Grade II-III Mobilization  Supine, Right Shoulder Physiological Scaption Grade II-III Mobilization  Supine, Right Shoulder Physiological Abduction Grade II-III Mobilization  Supine, Right GH Inferior Grade I-II Mobilization (OPP)  Supine, Right Shoulder Multi-Directional Rhythmic Stabilization (90 deg flexion, scapular protraction - at wrist)   Rationale: decrease pain, increase ROM and increase tissue extensibility to improve ease with functional ADLs      20 min Neuromuscular Re-education:  [x]  See flow sheet: Emphasis placed on promotion of activation and recruitment of the scapulothoracic and glenohumeral musculature   Rationale: increase ROM and increase strength  to improve the patients ability to improve ease with overhead lifting        With   [] TE   [] TA   [] neuro   [] other: Patient Education: [x] Review HEP    [] Progressed/Changed HEP based on:   [] positioning   [] body mechanics   [] transfers   [] heat/ice application    [] other:      Pain Level (0-10 scale) post treatment: 0    ASSESSMENT/Changes in Function: Patient noted with a significant reduction in pain intensity and severity therefore reinitiation of previously completed exercises with modification, as appropriate, to ensure complete tolerance. Continued question regarding presence of acute biceps tendinopathy with patient education to promote improved self-management. Patient will continue to benefit from skilled PT services to modify and progress therapeutic interventions, address functional mobility deficits, address ROM deficits, address strength deficits and analyze and address soft tissue restrictions to attain remaining goals. []  See Plan of Care  []  See progress note/recertification  []  See Discharge Summary         Progress towards goals / Updated goals:    Short Term Goals: To be accomplished in 3 weeks:  1. Patient will subjectively report full compliance with prescribed HEP. At PN: Met, HEP performance reported as prescribed, 9/7/2018  2. Patient will demonstrate right shoulder flexion and abduction PROM >/=160 degrees to improve ease with bathing. At PN: Progressing, Right Shoulder Flexion  PROM 160 deg, Right Shoulder Abduction PROM 145 deg, 10/1/2018  Current: Remains, Right Shoulder Flexion  PROM 160 deg, Right Shoulder Abduction PROM 145 deg, 10/9/2018  3. Patient will demonstrate right shoulder flexion and abduction AROM >/=140 degrees to improve ease with functional overhead ADLs.   At PN: Progressing, Right Shoulder Flexion AROM 135 deg, Right Shoulder Abduction AROM 120 deg, 10/1/2018  Current: Met, Right Shoulder Flexion AROM 155 deg, Right Shoulder Abduction AROM 160 deg, 10/23/2018      Long Term Goals: To be accomplished in 6 weeks:  1. Patient will demonstrate a significant functional improvement as demonstrated by a score of >/=61 on FOTO. At PN: Progressing, FOTO = 52, 9/24/2018  Current: Met, FOTO = 61, 10/23/2018  2. Patient will demonstrate right shoulder flexion, abduction and ER MMT >/=4+/5 to improve ease with overhead lifting. At PN: Progressing,  Flexion 3+/5, Abduction 3+/5, ER 4/5, 10/4/2018  Current: Remains, Flexion 3+/5, Abduction 3+/5, ER 4/5, 10/23/2018  3. Patient will demonstrate right shoulder functional ER >/=C4 and functional IR >/=L3 to improve ease with dressing.   At PN: Progressing, Functional ER C2, Functional IR Right SIJ, 10/1/2018  Current: Progressing, Functional ER C5, Functional IR Right SIJ, 10/23/2018    PLAN  [x]  Upgrade activities as tolerated     [x]  Continue plan of care  []  Update interventions per flow sheet       []  Discharge due to:_  []  Other:_      Holly Bobo PT 10/23/2018  8:29 AM    Future Appointments   Date Time Provider Amalia Concepcion   10/23/2018 11:30 AM Denise Bullock PT [facility]   10/25/2018 12:00 PM Denise Bullock PT [facility]   10/29/2018 10:30 AM Denise Bullock PT [facility]   12/12/2018 10:00 AM Ji Love MD [facility]

## 2018-10-25 ENCOUNTER — HOSPITAL ENCOUNTER (OUTPATIENT)
Dept: PHYSICAL THERAPY | Age: 78
Discharge: HOME | End: 2018-10-25
Payer: MEDICARE

## 2018-10-25 PROCEDURE — 97110 THERAPEUTIC EXERCISES: CPT

## 2018-10-25 PROCEDURE — 97140 MANUAL THERAPY 1/> REGIONS: CPT

## 2018-10-25 PROCEDURE — 97112 NEUROMUSCULAR REEDUCATION: CPT

## 2018-10-26 ENCOUNTER — TELEPHONE (OUTPATIENT)
Dept: INTERNAL MEDICINE CLINIC | Age: 78
End: 2018-10-26

## 2018-10-26 DIAGNOSIS — Z00.00 WELLNESS EXAMINATION: Primary | ICD-10-CM

## 2018-10-26 DIAGNOSIS — R73.03 PREDIABETES: ICD-10-CM

## 2018-10-26 NOTE — TELEPHONE ENCOUNTER
Pt calling, states she wants to get labs before her next appt Dec 12th. She said she has always had labs done before her appts to check her platelets (every 4 months at least). No open orders in her chart. Please advise her at [phone].

## 2018-10-29 ENCOUNTER — HOSPITAL ENCOUNTER (OUTPATIENT)
Dept: PHYSICAL THERAPY | Age: 78
Discharge: HOME | End: 2018-10-29
Payer: MEDICARE

## 2018-10-29 PROCEDURE — G8986 CARRY D/C STATUS: HCPCS

## 2018-10-29 PROCEDURE — G8984 CARRY CURRENT STATUS: HCPCS

## 2018-10-29 PROCEDURE — G8985 CARRY GOAL STATUS: HCPCS

## 2018-10-29 PROCEDURE — 97110 THERAPEUTIC EXERCISES: CPT

## 2018-10-29 PROCEDURE — 97112 NEUROMUSCULAR REEDUCATION: CPT

## 2018-10-29 NOTE — PROGRESS NOTES
[facility] - [facility]              [address]        [place], [address]   [phone] fax    Continued Plan of Care/ Re-certification for Physical Therapy Services    Patient name: Vivek Hampton Start of Care: 2018   Referral source: Daniel De La Rosa : 1940   Medical/Treatment Diagnosis: Pain in right shoulder [M25.511] Onset Date:DoS 2018     Prior Hospitalization: see medical history Provider#: [id number]   Medications: Verified on Patient Summary List    Comorbidities: Arthritis, Osteoporosis, Right CMC arthritis, Back Pain   Prior Level of Function: RHD, (I) Functional ADLs, (I) Driving, Retired, No previous cervical nor shoulder injuries  Visits from Start of Care: 15    Missed Visits: 0    The Plan of Care and following information is based on the patient's current status:    Short Term Goals: To be accomplished in 3 weeks:  1. Patient will subjectively report full compliance with prescribed HEP. At Last PN: Met, HEP performance reported as prescribed  2. Patient will demonstrate right shoulder flexion and abduction PROM >/=160 degrees to improve ease with bathing. At Last PN: Progressing, Right Shoulder Flexion  PROM 160 deg, Right Shoulder Abduction PROM 145 deg  At PN: Remains, Right Shoulder Flexion  PROM 160 deg, Right Shoulder Abduction PROM 145 deg  3. Patient will demonstrate right shoulder flexion and abduction AROM >/=140 degrees to improve ease with functional overhead ADLs. At Last PN: Progressing, Right Shoulder Flexion AROM 135 deg, Right Shoulder Abduction AROM 120 deg  At PN: Met, Right Shoulder Flexion AROM 155 deg, Right Shoulder Abduction AROM 160 deg      Long Term Goals: To be accomplished in 6 weeks:  1. Patient will demonstrate a significant functional improvement as demonstrated by a score of >/=61 on FOTO. At Last PN: Progressing, FOTO = 52  At PN: Met, FOTO = 61  2.  Patient will demonstrate right shoulder flexion, abduction and ER MMT >/=4+/5 to improve ease with overhead lifting. At Last PN: Progressing,  Flexion 3+/5, Abduction 3+/5, ER 4/5  At PN: Remains, Flexion 3+/5, Abduction 3+/5, ER 4/5  3. Patient will demonstrate right shoulder functional ER >/=C4 and functional IR >/=L3 to improve ease with dressing. At Last PN: Progressing, Functional ER C2, Functional IR Right SIJ  At PN: Progressing, Functional ER C5, Functional IR Right SIJ     Key functional changes: See above goals. Problems/ barriers to goal attainment: None     Problem List: pain affecting function, decrease ROM, decrease strength, decrease ADL/ functional abilitiies, decrease activity tolerance and decrease flexibility/ joint mobility    Treatment Plan: Therapeutic exercise, Therapeutic activities, Neuromuscular re-education, Manual therapy, Patient education, Self Care training and Functional mobility training     Goals for this certification period: Continue with unmet goals above. Frequency / Duration: Patient to be placed on hold x30 days with patient to be discharged if no contact with clinic made within 30 day period. If further PT deemed appropriate patient to be seen 2 times per week for 2 weeks:    Assessment / Recommendations: At this time patient suitable to be placed on 30-day hold with patient objectively having demonstrated a significant functional improvement with patient subjectively reporting no limitations with completion of functional ADLs. Patient demonstrates right shoulder AROM WNL but continues to demonstrate weakness upon assessment of strength with patient with some mild proximal long head of the biceps tendinopathy with relief with activity modification and exercise modification. Patient provided with updated HEP to allow for independent progression with patient educated that if any issues arise patient to contact clinic to allow for return follow up for further assessment.  If no contact with clinic made within 30-day period patient in agreement with discharge. G-Codes (GP)  Carry   Current  CJ= 20-39%   [id number] Goal  CJ= 20-39%    The severity rating is based on clinical judgment and the FOTO score. Certification Period: 10/29/2018 - 11/28/2018    Margarito Donaldson, PT 10/29/2018 11:17 AM    ________________________________________________________________________  I certify that the above Therapy Services are being furnished while the patient is under my care. I agree with the treatment plan and certify that this therapy is necessary. [] I have read the above and request that my patient continue as recommended.   [] I have read the above report and request that my patient continue therapy with the following changes/special instructions: _______________________________________  [] I have read the above report and request that my patient be discharged from therapy    Physician's Signature:____________Date:_________TIME:________    ** Signature, Date and Time must be completed for valid certification **  Please sign and return to [facility] - [address] [place], [address]   [phone] fax

## 2018-10-29 NOTE — PROGRESS NOTES
PT DAILY TREATMENT NOTE - [facility]     Patient Name: Marcel Booth  Date:10/29/2018  : 1940  [x]  Patient  Verified  [place] / Plan: [place] & B / Product Type: Medicare /    In time:1035  Out time:1125  Total Treatment Time (min): 50  Total Timed Codes (min): 40  1:1 Treatment Time ([facility] only): 25   Visit #: 15 of 16    Treatment Area: Pain in right shoulder [M25.511]    SUBJECTIVE  Pain Level (0-10 scale): 1  Any medication changes, allergies to medications, adverse drug reactions, diagnosis change, or new procedure performed?: [x] No    [] Yes (see summary sheet for update)  Subjective functional status/changes:   [] No changes reported  Patient reports no change in symptoms since last treatment session. OBJECTIVE    Modality rationale: decrease inflammation and decrease pain to improve the patients ability to improve ease with sleep   Min Type Additional Details     10 [x]  Ice     []  heat  []  Ice massage Position: Reclined  Location: Right Shoulder, Post-Tx        15 min Therapeutic Exercise:  [x] See flow sheet : Emphasis placed on improving available shoulder AROM and strength and promotion of right shoulder PROM/AAROM   Rationale: increase ROM and increase strength to improve the patients ability to improve ease with self-care ADLs        25 min Neuromuscular Re-education:  [x]  See flow sheet: Emphasis placed on promotion of activation and recruitment of the scapulothoracic and glenohumeral musculature   Rationale: increase ROM and increase strength  to improve the patients ability to improve ease with overhead lifting       With   [] TE   [] TA   [] neuro   [] other: Patient Education: [x] Review HEP    [] Progressed/Changed HEP based on:   [] positioning   [] body mechanics   [] transfers   [] heat/ice application    [] other:      Pain Level (0-10 scale) post treatment: 0-1    ASSESSMENT/Changes in Function:  At this time patient suitable to be placed on 30-day hold with patient objectively having demonstrated a significant functional improvement with patient subjectively reporting no limitations with completion of functional ADLs. Patient demonstrates right shoulder AROM WNL but continues to demonstrate weakness upon assessment of strength with patient with some mild proximal long head of the biceps tendinopathy with relief with activity modification and exercise modification. Patient provided with updated HEP to allow for independent progression with patient educated that if any issues arise patient to contact clinic to allow for return follow up for further assessment. If no contact with clinic made within 30-day period patient in agreement with discharge. []  See Plan of Care  [x]  See progress note/recertification  []  See Discharge Summary         Progress towards goals / Updated goals:    Short Term Goals: To be accomplished in 3 weeks:  1. Patient will subjectively report full compliance with prescribed HEP. At PN: Met, HEP performance reported as prescribed, 9/7/2018  2. Patient will demonstrate right shoulder flexion and abduction PROM >/=160 degrees to improve ease with bathing. At PN: Progressing, Right Shoulder Flexion  PROM 160 deg, Right Shoulder Abduction PROM 145 deg, 10/1/2018  Current: Remains, Right Shoulder Flexion  PROM 160 deg, Right Shoulder Abduction PROM 145 deg, 10/9/2018  3. Patient will demonstrate right shoulder flexion and abduction AROM >/=140 degrees to improve ease with functional overhead ADLs. At PN: Progressing, Right Shoulder Flexion AROM 135 deg, Right Shoulder Abduction AROM 120 deg, 10/1/2018  Current: Met, Right Shoulder Flexion AROM 155 deg, Right Shoulder Abduction AROM 160 deg, 10/23/2018      Long Term Goals: To be accomplished in 6 weeks:  1. Patient will demonstrate a significant functional improvement as demonstrated by a score of >/=61 on FOTO.   At PN: Progressing, FOTO = 52, 9/24/2018  Current: Met, FOTO = 61, 10/23/2018  2. Patient will demonstrate right shoulder flexion, abduction and ER MMT >/=4+/5 to improve ease with overhead lifting. At PN: Progressing,  Flexion 3+/5, Abduction 3+/5, ER 4/5, 10/4/2018  Current: Remains, Flexion 3+/5, Abduction 3+/5, ER 4/5, 10/23/2018  3. Patient will demonstrate right shoulder functional ER >/=C4 and functional IR >/=L3 to improve ease with dressing.   At PN: Progressing, Functional ER C2, Functional IR Right SIJ, 10/1/2018  Current: Progressing, Functional ER C5, Functional IR Right SIJ, 10/29/2018    PLAN  []  Upgrade activities as tolerated     []  Continue plan of care  []  Update interventions per flow sheet       []  Discharge due to:_  [x]  Other:_ Hold x30 days     Ana Pratt PT 10/29/2018  6:55 AM    Future Appointments   Date Time Provider Amalia Jenkinsi   10/29/2018 10:30 AM Armando Light PT [facility]   12/7/2018 10:25 AM IO NURSE VISIT [facility] SCHED   12/12/2018 10:00 AM Steven Dao MD [facility]

## 2018-12-04 NOTE — PROGRESS NOTES
[facility] - [facility]              [address]        [place], [address]   [phone] fax      Discharge Summary  Patient name: Monika Escobar Start of Care: 2018   Referral source: Jalyn Sena* : 1940   Medical/Treatment Diagnosis: Pain in right shoulder [M25.511]  Payor: [employer] / Plan: [place] & B / Product Type: Medicare /  Onset Date:DoS 2018     Prior Hospitalization: see medical history Provider#: [id number]   Medications: Verified on Patient Summary List    Comorbidities: Arthritis, Osteoporosis, Right CMC arthritis, Back Pain   Prior Level of Function: RHD, (I) Functional ADLs, (I) Driving, Retired, No previous cervical nor shoulder injuries  Visits from Start of Care: 15                                            Missed Visits: 0  Reporting Period : 2018 to 10/29/2018      Summary of Care:    Short Term Goals: To be accomplished in 3 weeks:  1. Patient will subjectively report full compliance with prescribed HEP. At Last PN: Met, HEP performance reported as prescribed  2. Patient will demonstrate right shoulder flexion and abduction PROM >/=160 degrees to improve ease with bathing. At Last PN: Progressing, Right Shoulder Flexion  PROM 160 deg, Right Shoulder Abduction PROM 145 deg  At DC: Remains, Right Shoulder Flexion  PROM 160 deg, Right Shoulder Abduction PROM 145 deg  3. Patient will demonstrate right shoulder flexion and abduction AROM >/=140 degrees to improve ease with functional overhead ADLs. At Last PN: Progressing, Right Shoulder Flexion AROM 135 deg, Right Shoulder Abduction AROM 120 deg  At DC: Met, Right Shoulder Flexion AROM 155 deg, Right Shoulder Abduction AROM 160 deg      Long Term Goals: To be accomplished in 6 weeks:  1. Patient will demonstrate a significant functional improvement as demonstrated by a score of >/=61 on FOTO.   At Last PN: Progressing, FOTO = 52  At DC: Met, FOTO = 61  2. Patient will demonstrate right shoulder flexion, abduction and ER MMT >/=4+/5 to improve ease with overhead lifting. At Last PN: Progressing,  Flexion 3+/5, Abduction 3+/5, ER 4/5  At DC: Remains, Flexion 3+/5, Abduction 3+/5, ER 4/5  3. Patient will demonstrate right shoulder functional ER >/=C4 and functional IR >/=L3 to improve ease with dressing. At Last PN: Progressing, Functional ER C2, Functional IR Right SIJ  At DC: Progressing, Functional ER C5, Functional IR Right SIJ    G-Codes (GP)  Carry    Goal  CJ= 20-39%   D/C  CJ= 20-39%    The severity rating is based on clinical judgment and the FOTO Score score. ASSESSMENT/RECOMMENDATIONS:    At this time patient to be discharged in accordance with clinic 30 day policy with patient having last been seen 10/29/2018. At time of last visit patient was placed on 30 day hold secondary to significant functional improvement having been demonstrated with patient having been provided with updated HEP to allow for independent progression. Patient without contact with clinic within 30 day period.     [x]Discontinue therapy: [x]Patient has reached or is progressing toward set goals      []Patient is non-compliant or has abdicated      []Due to lack of appreciable progress towards set 55 Marlena Genao, PT 12/4/2018 1:07 PM

## 2018-12-07 ENCOUNTER — APPOINTMENT (OUTPATIENT)
Dept: INTERNAL MEDICINE CLINIC | Age: 78
End: 2018-12-07

## 2018-12-07 ENCOUNTER — HOSPITAL ENCOUNTER (OUTPATIENT)
Dept: LAB | Age: 78
Discharge: HOME | End: 2018-12-07
Payer: MEDICARE

## 2018-12-07 DIAGNOSIS — Z00.00 WELLNESS EXAMINATION: ICD-10-CM

## 2018-12-07 DIAGNOSIS — R73.03 PREDIABETES: ICD-10-CM

## 2018-12-07 LAB
ALBUMIN SERPL-MCNC: 3.9 G/DL (ref 3.4–5)
ALBUMIN/GLOB SERPL: 1.3 {RATIO} (ref 0.8–1.7)
ALP SERPL-CCNC: 57 U/L (ref 45–117)
ALT SERPL-CCNC: 34 U/L (ref 13–56)
ANION GAP SERPL CALC-SCNC: 7 MMOL/L (ref 3–18)
AST SERPL-CCNC: 20 U/L (ref 15–37)
BASOPHILS # BLD: 0 K/UL (ref 0–0.1)
BASOPHILS NFR BLD: 1 % (ref 0–2)
BILIRUB SERPL-MCNC: 0.2 MG/DL (ref 0.2–1)
BUN SERPL-MCNC: 17 MG/DL (ref 7–18)
BUN/CREAT SERPL: 20 (ref 12–20)
CALCIUM SERPL-MCNC: 9.8 MG/DL (ref 8.5–10.1)
CHLORIDE SERPL-SCNC: 102 MMOL/L (ref 100–108)
CO2 SERPL-SCNC: 27 MMOL/L (ref 21–32)
CREAT SERPL-MCNC: 0.86 MG/DL (ref 0.6–1.3)
DIFFERENTIAL METHOD BLD: NORMAL
EOSINOPHIL # BLD: 0.2 K/UL (ref 0–0.4)
EOSINOPHIL NFR BLD: 4 % (ref 0–5)
ERYTHROCYTE [DISTWIDTH] IN BLOOD BY AUTOMATED COUNT: 14.1 % (ref 11.6–14.5)
GLOBULIN SER CALC-MCNC: 3.1 G/DL (ref 2–4)
GLUCOSE SERPL-MCNC: 108 MG/DL (ref 74–99)
HBA1C MFR BLD: 5.9 % (ref 4.2–5.6)
HCT VFR BLD AUTO: 40.7 % (ref 35–45)
HGB BLD-MCNC: 13.2 G/DL (ref 12–16)
LYMPHOCYTES # BLD: 1.7 K/UL (ref 0.9–3.6)
LYMPHOCYTES NFR BLD: 30 % (ref 21–52)
MCH RBC QN AUTO: 30.4 PG (ref 24–34)
MCHC RBC AUTO-ENTMCNC: 32.4 G/DL (ref 31–37)
MCV RBC AUTO: 93.8 FL (ref 74–97)
MONOCYTES # BLD: 0.5 K/UL (ref 0.05–1.2)
MONOCYTES NFR BLD: 9 % (ref 3–10)
NEUTS SEG # BLD: 3.2 K/UL (ref 1.8–8)
NEUTS SEG NFR BLD: 56 % (ref 40–73)
PLATELET # BLD AUTO: 225 K/UL (ref 135–420)
PMV BLD AUTO: 10.7 FL (ref 9.2–11.8)
POTASSIUM SERPL-SCNC: 4.3 MMOL/L (ref 3.5–5.5)
PROT SERPL-MCNC: 7 G/DL (ref 6.4–8.2)
RBC # BLD AUTO: 4.34 M/UL (ref 4.2–5.3)
SODIUM SERPL-SCNC: 136 MMOL/L (ref 136–145)
WBC # BLD AUTO: 5.6 K/UL (ref 4.6–13.2)

## 2018-12-07 PROCEDURE — 85025 COMPLETE CBC W/AUTO DIFF WBC: CPT

## 2018-12-07 PROCEDURE — 36415 COLL VENOUS BLD VENIPUNCTURE: CPT

## 2018-12-07 PROCEDURE — 80053 COMPREHEN METABOLIC PANEL: CPT

## 2018-12-07 PROCEDURE — 83036 HEMOGLOBIN GLYCOSYLATED A1C: CPT

## 2018-12-12 ENCOUNTER — OFFICE VISIT (OUTPATIENT)
Dept: INTERNAL MEDICINE CLINIC | Age: 78
End: 2018-12-12

## 2018-12-12 VITALS
DIASTOLIC BLOOD PRESSURE: 76 MMHG | BODY MASS INDEX: 29.09 KG/M2 | HEART RATE: 96 BPM | RESPIRATION RATE: 14 BRPM | HEIGHT: 63 IN | OXYGEN SATURATION: 96 % | WEIGHT: 164.2 LBS | SYSTOLIC BLOOD PRESSURE: 116 MMHG | TEMPERATURE: 97.5 F

## 2018-12-12 DIAGNOSIS — R68.2 DRY MOUTH: ICD-10-CM

## 2018-12-12 DIAGNOSIS — R60.0 BILATERAL LEG EDEMA: Primary | ICD-10-CM

## 2018-12-12 DIAGNOSIS — R63.5 WEIGHT GAIN: ICD-10-CM

## 2018-12-12 DIAGNOSIS — M19.90 ARTHRITIS: ICD-10-CM

## 2018-12-12 DIAGNOSIS — E78.00 HYPERCHOLESTEROLEMIA: ICD-10-CM

## 2018-12-12 DIAGNOSIS — R73.03 PREDIABETES: ICD-10-CM

## 2018-12-12 RX ORDER — FUROSEMIDE 20 MG/1
TABLET ORAL
Qty: 30 TAB | Refills: 0 | Status: SHIPPED | OUTPATIENT
Start: 2018-12-12

## 2018-12-12 NOTE — PATIENT INSTRUCTIONS
Advance Directives: Care Instructions  Your Care Instructions  An advance directive is a legal way to state your wishes at the end of your life. It tells your family and your doctor what to do if you can no longer say what you want. There are two main types of advance directives. You can change them any time that your wishes change. · A living will tells your family and your doctor your wishes about life support and other treatment. · A durable power of  for health care lets you name a person to make treatment decisions for you when you can't speak for yourself. This person is called a health care agent. If you do not have an advance directive, decisions about your medical care may be made by a doctor or a  who doesn't know you. It may help to think of an advance directive as a gift to the people who care for you. If you have one, they won't have to make tough decisions by themselves. Follow-up care is a key part of your treatment and safety. Be sure to make and go to all appointments, and call your doctor if you are having problems. It's also a good idea to know your test results and keep a list of the medicines you take. How can you care for yourself at home? · Discuss your wishes with your loved ones and your doctor. This way, there are no surprises. · Many states have a unique form. Or you might use a universal form that has been approved by many states. This kind of form can sometimes be completed and stored online. Your electronic copy will then be available wherever you have a connection to the Internet. In most cases, doctors will respect your wishes even if you have a form from a different state. · You don't need a  to do an advance directive. But you may want to get legal advice. · Think about these questions when you prepare an advance directive:  ? Who do you want to make decisions about your medical care if you are not able to?  Many people choose a family member or close friend. ? Do you know enough about life support methods that might be used? If not, talk to your doctor so you understand. ? What are you most afraid of that might happen? You might be afraid of having pain, losing your independence, or being kept alive by machines. ? Where would you prefer to die? Choices include your home, a hospital, or a nursing home. ? Would you like to have information about hospice care to support you and your family? ? Do you want to donate organs when you die? ? Do you want certain Protestant practices performed before you die? If so, put your wishes in the advance directive. · Read your advance directive every year, and make changes as needed. When should you call for help? Be sure to contact your doctor if you have any questions. Where can you learn more? Go to http://robbin-duglas.info/. Enter R264 in the search box to learn more about \"Advance Directives: Care Instructions. \"  Current as of: April 19, 2018  Content Version: 11.8  © 8843-5036 [employer]. Care instructions adapted under license by [employer] (which disclaims liability or warranty for this information). If you have questions about a medical condition or this instruction, always ask your healthcare professional. [place] [address] any warranty or liability for your use of this information. Leg and Ankle Edema: Care Instructions  Your Care Instructions  Swelling in the legs, ankles, and feet is called edema. It is common after you sit or stand for a while. Long plane flights or car rides often cause swelling in the legs and feet. You may also have swelling if you have to stand for long periods of time at your job. Problems with the veins in the legs (varicose veins) and changes in hormones can also cause swelling.  Sometimes the swelling in the ankles and feet is caused by a more serious problem, such as heart failure, infection, blood clots, or liver or kidney disease. Follow-up care is a key part of your treatment and safety. Be sure to make and go to all appointments, and call your doctor if you are having problems. It's also a good idea to know your test results and keep a list of the medicines you take. How can you care for yourself at home? · If your doctor gave you medicine, take it as prescribed. Call your doctor if you think you are having a problem with your medicine. · Whenever you are resting, raise your legs up. Try to keep the swollen area higher than the level of your heart. · Take breaks from standing or sitting in one position. ? Walk around to increase the blood flow in your lower legs. ? Move your feet and ankles often while you stand, or tighten and relax your leg muscles. · Wear support stockings. Put them on in the morning, before swelling gets worse. · Eat a balanced diet. Lose weight if you need to. · Limit the amount of salt (sodium) in your diet. Salt holds fluid in the body and may increase swelling. When should you call for help? Call 911 anytime you think you may need emergency care. For example, call if:    · You have symptoms of a blood clot in your lung (called a pulmonary embolism). These may include:  ? Sudden chest pain. ? Trouble breathing. ? Coughing up blood.    Call your doctor now or seek immediate medical care if:    · You have signs of a blood clot, such as:  ? Pain in your calf, back of the knee, thigh, or groin. ? Redness and swelling in your leg or groin.     · You have symptoms of infection, such as:  ? Increased pain, swelling, warmth, or redness. ? Red streaks or pus. ? A fever.    Watch closely for changes in your health, and be sure to contact your doctor if:    · Your swelling is getting worse.     · You have new or worsening pain in your legs.     · You do not get better as expected. Where can you learn more? Go to http://robbin-duglas.info/.   Enter Q099 in the search box to learn more about \"Leg and Ankle Edema: Care Instructions. \"  Current as of: November 20, 2017  Content Version: 11.8  © 5900-1217 [employer], [employer]. Care instructions adapted under license by [employer] (which disclaims liability or warranty for this information). If you have questions about a medical condition or this instruction, always ask your healthcare professional. [place] [address] any warranty or liability for your use of this information.

## 2018-12-12 NOTE — PROGRESS NOTES
1. Have you been to the ER, urgent care clinic or hospitalized since your last visit? NO.     2. Have you seen or consulted any other health care providers outside of the [address] [place] [address] since your last visit (Include any pap smears or colon screening)? YES      Do you have an Advanced Directive? NO    Would you like information on Advanced Directives?  YES

## 2018-12-12 NOTE — PROGRESS NOTES
HPI     Kassy Pina is a 69 y.o. female with relevant past medical history of chronic pain, arthritis, hyperlipidemia, HTN, recurrent UTIs, OAB, osteopenia, vit D deficiency, prediabetes, neuropathy, h/o R shoulder rotator cuff repair, by Dr. Hannah Marie on 7/20/18. Reports leg edema, seen here in Sept for same reason. Reports it comes and goes, sometimes wakes up with it, but most days its more noticeable in the evening. No local redness, warmth, tenderness. Denies any recent trauma. Had a car trip to [place], but reports taking multiple breaks and walking. Denies any SOB, dizziness, CP, malaise, N/V/D. Sometimes she wakes up with dry mouth and feels very thirsty so she drinks abundant water on those days. The leg edema, dry mouth and thirst are not every day. Denies any claudication. Leg surgery. Edema started around 7/2018 on and off. Diet has not changed significantly she says. Reports no UTI symptoms. Reports compliance with medications. Most recent labs reviewed with patient. ROS  As above included in HPI. Otherwise 11 point review of systems negative including constitutional, skin, HENT, eyes, respiratory, cardiovascular, gastrointestinal, genitourinary, musculoskeletal, endocrine, hematologic, allergy, and neurologic. Past Medical History  Past Medical History:   Diagnosis Date    Arthritis     Breast tumor     Chronic pain     Hypercholesterolemia      History reviewed. No pertinent surgical history. Family History  Family History   Problem Relation Age of Onset    Hypertension Mother     Diabetes Mother     Stroke Father     Heart Disease Brother        Social History  She  reports that she has quit smoking.  she has never used smokeless tobacco.   Social History     Substance and Sexual Activity   Alcohol Use Yes    Alcohol/week: 0.6 oz    Types: 1 Glasses of wine per week    Comment: socially       Immunization History  Immunization History   Administered Date(s) Administered   Loreta Atwood Influenza Vaccine (Tri) Adjuvanted 10/12/2018    Zoster Recombinant 06/06/2018, 07/11/2018       Allergies  Allergies   Allergen Reactions    Hydrocodone Bitartrate Nausea Only    Ibuprofen Other (comments)     Platelets drop     Iodinated Contrast- Oral And Iv Dye Other (comments)    Iodine Swelling       Medications  Current Outpatient Medications   Medication Sig    furosemide (LASIX) 20 mg tablet Take by mouth daily as needed for severe swelling in legs only    hydroCHLOROthiazide (HYDRODIURIL) 25 mg tablet take 1 tablet by mouth once daily at bedtime    COL-RITE 100 mg capsule take 1 capsule by mouth once daily    cyclobenzaprine (FLEXERIL) 10 mg tablet CYCLOBENZAPRINE HCL 10MG, 1 (ONE) TABLET TABLET EVERY 8 HOURS, AS NEEDED # 90, 03/17/2015, REF. X3. ACTIVE    busPIRone (BUSPAR) 10 mg tablet Take 1 Tab by mouth three (3) times daily.  simvastatin (ZOCOR) 20 mg tablet TAKE 1 TABLET BY MOUTH EVERY NIGHT AT BEDTIME.  diclofenac (VOLTAREN) 1 % gel Apply  to affected area four (4) times daily.  desonide (TRIDESILON) 0.05 % topical lotion Apply  to affected area two (2) times a day.  psyllium seed, with sugar, (FIBER SUPPLEMENT PO) Take  by mouth.  clobetasol (TEMOVATE) 0.05 % external solution     diclofenac (VOLTAREN) 1 % gel     nystatin-triamcinolone (MYCOLOG) 100,000-0.1 unit/gram-% ointment nystatin-triamcinolone 100,000 unit/gram-0.1 % topical ointment    tacrolimus (PROTOPIC) 0.1 % ointment tacrolimus 0.1 % topical ointment    celecoxib (CELEBREX) 200 mg capsule Take 200 mg by mouth two (2) times a day.  VITAMIN D2 50,000 unit capsule TAKE 1 CAPSULE BY MOUTH EVERY THURSDAY.  gabapentin (NEURONTIN) 300 mg capsule Take 300 mg by mouth four (4) times daily.  ketoconazole (NIZORAL) 2 % shampoo     raloxifene (EVISTA) 60 mg tablet     venlafaxine-SR (EFFEXOR-XR) 75 mg capsule Take 75 mg by mouth daily. No current facility-administered medications for this visit. Visit Vitals  /76 (BP 1 Location: Left arm, BP Patient Position: Sitting)   Pulse 96   Temp 97.5 °F (36.4 °C) (Oral)   Resp 14   Ht 5' 3\" (1.6 m)   Wt 164 lb 3.2 oz (74.5 kg)   SpO2 96%   BMI 29.09 kg/m²     Body mass index is 29.09 kg/m². Physical Exam   Constitutional: She is oriented to person, place, and time. No distress. HENT:   Head: Normocephalic and atraumatic. Mouth/Throat: Oropharynx is clear and moist.   Eyes: Conjunctivae are normal. Pupils are equal, round, and reactive to light. Neck: Neck supple. No JVD present. Cardiovascular: Normal rate and regular rhythm. Pulmonary/Chest: Effort normal.   Abdominal: Soft. Musculoskeletal: She exhibits no edema or deformity. Neurological: She is alert and oriented to person, place, and time. Skin: Skin is warm. No rash noted. She is not diaphoretic. Nursing note and vitals reviewed. [address] Outpatient Visit on 12/07/2018   Component Date Value Ref Range Status    WBC 12/07/2018 5.6  4.6 - 13.2 K/uL Final    RBC 12/07/2018 4.34  4.20 - 5.30 M/uL Final    HGB 12/07/2018 13.2  12.0 - 16.0 g/dL Final    HCT 12/07/2018 40.7  35.0 - 45.0 % Final    MCV 12/07/2018 93.8  74.0 - 97.0 FL Final    MCH 12/07/2018 30.4  24.0 - 34.0 PG Final    MCHC 12/07/2018 32.4  31.0 - 37.0 g/dL Final    RDW 12/07/2018 14.1  11.6 - 14.5 % Final    PLATELET 90/84/8462 565  135 - 420 K/uL Final    MPV 12/07/2018 10.7  9.2 - 11.8 FL Final    NEUTROPHILS 12/07/2018 56  40 - 73 % Final    LYMPHOCYTES 12/07/2018 30  21 - 52 % Final    MONOCYTES 12/07/2018 9  3 - 10 % Final    EOSINOPHILS 12/07/2018 4  0 - 5 % Final    BASOPHILS 12/07/2018 1  0 - 2 % Final    ABS. NEUTROPHILS 12/07/2018 3.2  1.8 - 8.0 K/UL Final    ABS. LYMPHOCYTES 12/07/2018 1.7  0.9 - 3.6 K/UL Final    ABS. MONOCYTES 12/07/2018 0.5  0.05 - 1.2 K/UL Final    ABS. EOSINOPHILS 12/07/2018 0.2  0.0 - 0.4 K/UL Final    ABS.  BASOPHILS 12/07/2018 0.0 0.0 - 0.1 K/UL Final    DF 12/07/2018 AUTOMATED    Final    Sodium 12/07/2018 136  136 - 145 mmol/L Final    Potassium 12/07/2018 4.3  3.5 - 5.5 mmol/L Final    Chloride 12/07/2018 102  100 - 108 mmol/L Final    CO2 12/07/2018 27  21 - 32 mmol/L Final    Anion gap 12/07/2018 7  3.0 - 18 mmol/L Final    Glucose 12/07/2018 108* 74 - 99 mg/dL Final    BUN 12/07/2018 17  7.0 - 18 MG/DL Final    Creatinine 12/07/2018 0.86  0.6 - 1.3 MG/DL Final    BUN/Creatinine ratio 12/07/2018 20  12 - 20   Final    GFR est AA 12/07/2018 >60  >60 ml/min/1.73m2 Final    GFR est non-AA 12/07/2018 >60  >60 ml/min/1.73m2 Final    Calcium 12/07/2018 9.8  8.5 - 10.1 MG/DL Final    Bilirubin, total 12/07/2018 0.2  0.2 - 1.0 MG/DL Final    ALT (SGPT) 12/07/2018 34  13 - 56 U/L Final    AST (SGOT) 12/07/2018 20  15 - 37 U/L Final    Alk. phosphatase 12/07/2018 57  45 - 117 U/L Final    Protein, total 12/07/2018 7.0  6.4 - 8.2 g/dL Final    Albumin 12/07/2018 3.9  3.4 - 5.0 g/dL Final    Globulin 12/07/2018 3.1  2.0 - 4.0 g/dL Final    A-G Ratio 12/07/2018 1.3  0.8 - 1.7   Final    Hemoglobin A1c 12/07/2018 5.9* 4.2 - 5.6 % Final         CT Results (most recent):  Results from Hospital Encounter encounter on 11/30/15   CT CHEST WO CONT    Narrative EXAMINATION: CT chest without contrast.    HISTORY:  Thoracic spine pain. Right chest and rib pain 9-10 weeks. No history  of trauma. TECHNIQUE:  Multiple contiguous axial CT images at 5 mm increments were obtained from the  apex of the lungs to the  domes of the diaphragm without intravenous contrast.    FINDINGS:    Minimal bibasilar discoid atelectasis versus scarring. No lung masses. Pretracheal lymph node 12 x 18 mm. Several smaller right paratracheal lymph  nodes. Small aortic pulmonic lymph nodes. No gross hilar lymph node enlargement. Mild degenerative spurring minimal thoracic levels. No compression deformities. No blastic or lytic lesions in the thoracic spine. No discrete rib lesions  identified. No evidence of healing rib fracture. IMPRESSION:  1. Nonspecific prominent pretracheal lymph nodes likely reactive. Recommend  followup in 6 months with intravenous contrast to ensure stability. 2.  Mild degenerative changes of the thoracic spine without evidence of acute  compression fracture or lesion. 3.  No discrete rib lesions identified. ST/acw    Electronically signed by: Pavan Calle Date:  11/30/2015 21:11        XR Results (most recent):  Results from [place] encounter on 02/16/16   XR RIBS UNILATERAL    Narrative HISTORY: Right rib pain. IMPRESSION: Three views of the right ribs reveal no rib fracture, hemothorax, or  pneumothorax. VDL/pab    Electronically signed by: Edna Her Date:  02/16/2016 14:43      CT   All Micro Results     None                DIAGNOSIS AND PLAN  Patient Active Problem List   Diagnosis Code    Breast tumor D49.3    Chronic pain G89.29    Hypercholesterolemia E78.00    Arthritis M19.90    History of recurrent UTIs Z87.440    Overactive bladder N32.81     1. Bilateral leg edema  Likely due to mild venous insufficiency  Low sodium intake recommended. Leg elevation, if severe, okay to take low dose furosemide as needed. Work up as follows  - ECHO ADULT COMPLETE; Future  - DUPLEX LOWER EXT VENOUS BILAT; Future  - CBC WITH AUTOMATED DIFF; Future  - METABOLIC PANEL, COMPREHENSIVE; Future    2. Hypercholesterolemia  Well controlled. 3. Arthritis  Stable. Following with rheum and ortho. 4. Prediabetes  HbA1c 5.9 on last labs 12/7/18. Will monitor. Diet discussed, see below. 5. Dry mouth  Possibly due to sleeping with mouth open, recommended to avoid high pillows, and use a humidifier at bed time. If not improving, may need to discuss with rheum. 6. Weight gain  No significant fluid retention noticed on exam today.  Focus was done on healthy life style interventions, we spent more than 50% of this 30 minute encounter on diet recommendations including frequency of meals, portion control, caloric and macronutrient recommendations, and regular exercise as tolerated. Low sodium diet to avoid fluid retention. Follow-up Disposition:  Return in about 4 months (around 4/12/2019). Elen Pritchett MD

## 2018-12-19 ENCOUNTER — HOSPITAL ENCOUNTER (OUTPATIENT)
Dept: NON INVASIVE DIAGNOSTICS | Age: 78
Discharge: HOME | End: 2018-12-19
Attending: INTERNAL MEDICINE
Payer: MEDICARE

## 2018-12-19 ENCOUNTER — HOSPITAL ENCOUNTER (OUTPATIENT)
Dept: VASCULAR SURGERY | Age: 78
Discharge: HOME | End: 2018-12-19
Attending: INTERNAL MEDICINE
Payer: MEDICARE

## 2018-12-19 VITALS
DIASTOLIC BLOOD PRESSURE: 76 MMHG | HEIGHT: 63 IN | SYSTOLIC BLOOD PRESSURE: 116 MMHG | BODY MASS INDEX: 29.06 KG/M2 | WEIGHT: 164 LBS

## 2018-12-19 DIAGNOSIS — R60.0 BILATERAL LEG EDEMA: ICD-10-CM

## 2018-12-19 LAB
ECHO AO ROOT DIAM: 2.98 CM
ECHO LA AREA 4C: 10.9 CM2
ECHO LA VOL 2C: 37.8 ML (ref 22–52)
ECHO LA VOL 4C: 19.74 ML (ref 22–52)
ECHO LA VOL BP: 30.4 ML (ref 22–52)
ECHO LA VOL/BSA BIPLANE: 17.1 ML/M2 (ref 16–28)
ECHO LA VOLUME INDEX A2C: 21.27 ML/M2 (ref 16–28)
ECHO LA VOLUME INDEX A4C: 11.11 ML/M2 (ref 16–28)
ECHO LV E' LATERAL VELOCITY: 7.99 CM/S
ECHO LV E' SEPTAL VELOCITY: 6.88 CM/S
ECHO LV INTERNAL DIMENSION DIASTOLIC: 3.23 CM (ref 3.9–5.3)
ECHO LV INTERNAL DIMENSION SYSTOLIC: 2.1 CM
ECHO LV IVSD: 0.88 CM (ref 0.6–0.9)
ECHO LV MASS 2D: 94.9 G (ref 67–162)
ECHO LV MASS INDEX 2D: 53.4 G/M2 (ref 43–95)
ECHO LV POSTERIOR WALL DIASTOLIC: 1.06 CM (ref 0.6–0.9)
ECHO LVOT DIAM: 1.98 CM
ECHO LVOT PEAK GRADIENT: 4.2 MMHG
ECHO LVOT PEAK VELOCITY: 102.38 CM/S
ECHO LVOT VTI: 22.1 CM
ECHO MV A VELOCITY: 92.62 CM/S
ECHO MV E DECELERATION TIME (DT): 203.6 MS
ECHO MV E VELOCITY: 0.83 CM/S
ECHO MV E/A RATIO: 0.01
ECHO MV E/E' LATERAL: 0.1
ECHO MV E/E' RATIO (AVERAGED): 0.11
ECHO MV E/E' SEPTAL: 0.12
ECHO PULMONARY ARTERY SYSTOLIC PRESSURE (PASP): 21 MMHG
ECHO RV TAPSE: 1.82 CM (ref 1.5–2)
ECHO TV REGURGITANT MAX VELOCITY: 213.84 CM/S
ECHO TV REGURGITANT PEAK GRADIENT: 18.3 MMHG

## 2018-12-19 PROCEDURE — 93306 TTE W/DOPPLER COMPLETE: CPT

## 2018-12-19 PROCEDURE — 93970 EXTREMITY STUDY: CPT

## 2019-01-04 ENCOUNTER — TELEPHONE (OUTPATIENT)
Dept: INTERNAL MEDICINE CLINIC | Age: 79
End: 2019-01-04

## 2019-01-04 RX ORDER — GABAPENTIN 300 MG/1
300 CAPSULE ORAL 4 TIMES DAILY
Qty: 120 CAP | Refills: 3 | Status: SHIPPED | OUTPATIENT
Start: 2019-01-04 | End: 2019-08-28 | Stop reason: SDUPTHER

## 2019-01-04 NOTE — TELEPHONE ENCOUNTER
Last OV: 12/12/2018      Pharmacy requesting refill of Duloxetine HCL 60mg. Do not show this medication on patient's chart. Refill request states Take 1 capsule by Mouth Twice a day. Pharmacy requesting 60 capsules with 2 refills.

## 2019-01-07 NOTE — TELEPHONE ENCOUNTER
I don't believe I have prescribed this medication for her. Perhaps pharmacy can provide information of previous prescriber and contact them.  Thanks

## 2019-01-09 NOTE — TELEPHONE ENCOUNTER
Called and spoke to Reji Montelongo at the pharmacy and she stated that they must have sent it on the wrong patient to our office.   Pharmacy stated that we can disregard this request.

## 2019-01-16 RX ORDER — ERGOCALCIFEROL 1.25 MG/1
CAPSULE ORAL
Qty: 12 CAP | Refills: 3 | Status: SHIPPED | OUTPATIENT
Start: 2019-01-16

## 2019-03-19 LAB — MAMMOGRAPHY, EXTERNAL: NORMAL

## 2019-03-27 RX ORDER — HYDROCHLOROTHIAZIDE 25 MG/1
TABLET ORAL
Qty: 90 TAB | Refills: 1 | Status: SHIPPED | OUTPATIENT
Start: 2019-03-27 | End: 2019-09-20 | Stop reason: SDUPTHER

## 2019-03-29 ENCOUNTER — APPOINTMENT (OUTPATIENT)
Dept: INTERNAL MEDICINE CLINIC | Age: 79
End: 2019-03-29

## 2019-03-29 ENCOUNTER — HOSPITAL ENCOUNTER (OUTPATIENT)
Dept: LAB | Age: 79
Discharge: HOME | End: 2019-03-29
Payer: MEDICARE

## 2019-03-29 DIAGNOSIS — R60.0 BILATERAL LEG EDEMA: ICD-10-CM

## 2019-03-29 DIAGNOSIS — E78.00 HYPERCHOLESTEROLEMIA: ICD-10-CM

## 2019-03-29 DIAGNOSIS — R73.03 PREDIABETES: ICD-10-CM

## 2019-03-29 LAB
ALBUMIN SERPL-MCNC: 3.8 G/DL (ref 3.4–5)
ALBUMIN/GLOB SERPL: 1.4 {RATIO} (ref 0.8–1.7)
ALP SERPL-CCNC: 57 U/L (ref 45–117)
ALT SERPL-CCNC: 32 U/L (ref 13–56)
ANION GAP SERPL CALC-SCNC: 9 MMOL/L (ref 3–18)
AST SERPL-CCNC: 19 U/L (ref 15–37)
BASOPHILS # BLD: 0 K/UL (ref 0–0.1)
BASOPHILS NFR BLD: 0 % (ref 0–2)
BILIRUB SERPL-MCNC: 0.3 MG/DL (ref 0.2–1)
BUN SERPL-MCNC: 17 MG/DL (ref 7–18)
BUN/CREAT SERPL: 21 (ref 12–20)
CALCIUM SERPL-MCNC: 8.9 MG/DL (ref 8.5–10.1)
CHLORIDE SERPL-SCNC: 101 MMOL/L (ref 100–108)
CHOLEST SERPL-MCNC: 126 MG/DL
CO2 SERPL-SCNC: 29 MMOL/L (ref 21–32)
CREAT SERPL-MCNC: 0.81 MG/DL (ref 0.6–1.3)
DIFFERENTIAL METHOD BLD: ABNORMAL
EOSINOPHIL # BLD: 0.2 K/UL (ref 0–0.4)
EOSINOPHIL NFR BLD: 4 % (ref 0–5)
ERYTHROCYTE [DISTWIDTH] IN BLOOD BY AUTOMATED COUNT: 13.4 % (ref 11.6–14.5)
GLOBULIN SER CALC-MCNC: 2.8 G/DL (ref 2–4)
GLUCOSE SERPL-MCNC: 106 MG/DL (ref 74–99)
HBA1C MFR BLD: 6 % (ref 4.2–5.6)
HCT VFR BLD AUTO: 39.3 % (ref 35–45)
HDLC SERPL-MCNC: 45 MG/DL (ref 40–60)
HDLC SERPL: 2.8 {RATIO} (ref 0–5)
HGB BLD-MCNC: 13 G/DL (ref 12–16)
LDLC SERPL CALC-MCNC: 53.6 MG/DL (ref 0–100)
LIPID PROFILE,FLP: NORMAL
LYMPHOCYTES # BLD: 1.5 K/UL (ref 0.9–3.6)
LYMPHOCYTES NFR BLD: 31 % (ref 21–52)
MCH RBC QN AUTO: 30.9 PG (ref 24–34)
MCHC RBC AUTO-ENTMCNC: 33.1 G/DL (ref 31–37)
MCV RBC AUTO: 93.3 FL (ref 74–97)
MONOCYTES # BLD: 0.5 K/UL (ref 0.05–1.2)
MONOCYTES NFR BLD: 11 % (ref 3–10)
NEUTS SEG # BLD: 2.6 K/UL (ref 1.8–8)
NEUTS SEG NFR BLD: 54 % (ref 40–73)
PLATELET # BLD AUTO: 226 K/UL (ref 135–420)
PMV BLD AUTO: 10.2 FL (ref 9.2–11.8)
POTASSIUM SERPL-SCNC: 4 MMOL/L (ref 3.5–5.5)
PROT SERPL-MCNC: 6.6 G/DL (ref 6.4–8.2)
RBC # BLD AUTO: 4.21 M/UL (ref 4.2–5.3)
SODIUM SERPL-SCNC: 139 MMOL/L (ref 136–145)
TRIGL SERPL-MCNC: 137 MG/DL (ref ?–150)
VLDLC SERPL CALC-MCNC: 27.4 MG/DL
WBC # BLD AUTO: 4.8 K/UL (ref 4.6–13.2)

## 2019-03-29 PROCEDURE — 80061 LIPID PANEL: CPT

## 2019-03-29 PROCEDURE — 36415 COLL VENOUS BLD VENIPUNCTURE: CPT

## 2019-03-29 PROCEDURE — 83036 HEMOGLOBIN GLYCOSYLATED A1C: CPT

## 2019-03-29 PROCEDURE — 85025 COMPLETE CBC W/AUTO DIFF WBC: CPT

## 2019-03-29 PROCEDURE — 80053 COMPREHEN METABOLIC PANEL: CPT

## 2019-04-10 ENCOUNTER — OFFICE VISIT (OUTPATIENT)
Dept: INTERNAL MEDICINE CLINIC | Age: 79
End: 2019-04-10

## 2019-04-10 VITALS
RESPIRATION RATE: 15 BRPM | WEIGHT: 161 LBS | TEMPERATURE: 97.7 F | HEART RATE: 82 BPM | OXYGEN SATURATION: 98 % | DIASTOLIC BLOOD PRESSURE: 62 MMHG | BODY MASS INDEX: 28.53 KG/M2 | HEIGHT: 63 IN | SYSTOLIC BLOOD PRESSURE: 100 MMHG

## 2019-04-10 DIAGNOSIS — K76.0 FATTY LIVER: Primary | ICD-10-CM

## 2019-04-10 DIAGNOSIS — E78.00 HYPERCHOLESTEROLEMIA: ICD-10-CM

## 2019-04-10 DIAGNOSIS — M19.90 ARTHRITIS: ICD-10-CM

## 2019-04-10 DIAGNOSIS — M85.80 OSTEOPENIA, UNSPECIFIED LOCATION: ICD-10-CM

## 2019-04-10 DIAGNOSIS — R73.03 PREDIABETES: ICD-10-CM

## 2019-04-10 DIAGNOSIS — K75.81 NASH (NONALCOHOLIC STEATOHEPATITIS): ICD-10-CM

## 2019-04-10 NOTE — PROGRESS NOTES
1. Have you been to the ER, urgent care clinic or hospitalized since your last visit? NO.     2. Have you seen or consulted any other health care providers outside of the [address] [place] [address] since your last visit (Include any pap smears or colon screening)? YES      Do you have an Advanced Directive? NO    Would you like information on Advanced Directives?  NO

## 2019-04-10 NOTE — PROGRESS NOTES
HPI     Danika Mayes is a 69 y.o. female with relevant past medical history of  chronic pain, arthritis, hyperlipidemia, HTN, recurrent UTIs, OAB, osteopenia, vit D deficiency, prediabetes, neuropathy, h/o R shoulder rotator cuff repair, by Dr. Mindy Lam on 7/20/18. Reports feeling well. Occasionally noticing her left eye a little red, given steroid drops by ophthalmology before, but it is back, planning to consult ophthalmology again. No pruritus, FB sensation, pain or photophobia, no eye tearing or discharge. Reports h/o fatty liver and asking for follow up on that. Brings report of colonoscopy result from 11/2014 with Moderate diverticulosis found, but otherwise normal colonoscopy done. Said she had another one recently unremarkable. The patient is aware she does not need to keep getting colonoscopies or pap smears, or mammograms at this point, as she has not had an abnormal result, or increase risk factors for cancer, however she says she prefers to keep getting all studies being done. ROS  As above included in HPI. Otherwise 11 point review of systems negative including constitutional, skin, HENT, eyes, respiratory, cardiovascular, gastrointestinal, genitourinary, musculoskeletal, endocrine, hematologic, allergy, and neurologic. Past Medical History  Past Medical History:   Diagnosis Date    Arthritis     Breast tumor     Chronic pain     Hypercholesterolemia      No past surgical history on file. Family History  Family History   Problem Relation Age of Onset    Hypertension Mother     Diabetes Mother     Stroke Father     Heart Disease Brother        Social History  She  reports that she has quit smoking.  She has never used smokeless tobacco.   Social History     Substance and Sexual Activity   Alcohol Use Yes    Alcohol/week: 0.6 oz    Types: 1 Glasses of wine per week    Comment: socially       Immunization History  Immunization History   Administered Date(s) Administered   [address] [place] [address] Influenza Vaccine (Tri) Adjuvanted 10/12/2018    Zoster Recombinant 06/06/2018, 07/11/2018       Allergies  Allergies   Allergen Reactions    Hydrocodone Bitartrate Nausea Only    Ibuprofen Other (comments)     Platelets drop     Iodinated Contrast- Oral And Iv Dye Other (comments)    Iodine Swelling       Medications  Current Outpatient Medications   Medication Sig    hydroCHLOROthiazide (HYDRODIURIL) 25 mg tablet take 1 tablet by mouth once daily at bedtime    VITAMIN D2 50,000 unit capsule TAKE 1 CAPSULE BY MOUTH EVERY THURSDAY.  gabapentin (NEURONTIN) 300 mg capsule Take 1 Cap by mouth four (4) times daily. (Patient taking differently: Take 300 mg by mouth daily.)    furosemide (LASIX) 20 mg tablet Take by mouth daily as needed for severe swelling in legs only    COL-RITE 100 mg capsule take 1 capsule by mouth once daily    cyclobenzaprine (FLEXERIL) 10 mg tablet CYCLOBENZAPRINE HCL 10MG, 1 (ONE) TABLET TABLET EVERY 8 HOURS, AS NEEDED # 90, 03/17/2015, REF. X3. ACTIVE    busPIRone (BUSPAR) 10 mg tablet Take 1 Tab by mouth three (3) times daily.  simvastatin (ZOCOR) 20 mg tablet TAKE 1 TABLET BY MOUTH EVERY NIGHT AT BEDTIME.  diclofenac (VOLTAREN) 1 % gel Apply  to affected area four (4) times daily.  desonide (TRIDESILON) 0.05 % topical lotion Apply  to affected area two (2) times a day.  psyllium seed, with sugar, (FIBER SUPPLEMENT PO) Take  by mouth.  clobetasol (TEMOVATE) 0.05 % external solution     diclofenac (VOLTAREN) 1 % gel     nystatin-triamcinolone (MYCOLOG) 100,000-0.1 unit/gram-% ointment nystatin-triamcinolone 100,000 unit/gram-0.1 % topical ointment    tacrolimus (PROTOPIC) 0.1 % ointment tacrolimus 0.1 % topical ointment    celecoxib (CELEBREX) 200 mg capsule Take 200 mg by mouth two (2) times a day.  ketoconazole (NIZORAL) 2 % shampoo     raloxifene (EVISTA) 60 mg tablet     venlafaxine-SR (EFFEXOR-XR) 75 mg capsule Take 75 mg by mouth daily.      No current facility-administered medications for this visit. Visit Vitals  /62 (BP 1 Location: Right arm, BP Patient Position: Sitting)   Pulse 82   Temp 97.7 °F (36.5 °C) (Oral)   Resp 15   Ht 5' 3\" (1.6 m)   Wt 161 lb (73 kg)   SpO2 98%   BMI 28.52 kg/m²     Body mass index is 28.52 kg/m². Physical Exam   Constitutional: She is oriented to person, place, and time and well-developed, well-nourished, and in no distress. HENT:   Head: Normocephalic and atraumatic. Right Ear: External ear normal.   Left Ear: External ear normal.   Mouth/Throat: Oropharynx is clear and moist.   Eyes: Pupils are equal, round, and reactive to light. Conjunctivae are normal.   Neck: Neck supple. No thyromegaly present. Cardiovascular: Normal rate and regular rhythm. Pulmonary/Chest: Effort normal and breath sounds normal.   Abdominal: Soft. She exhibits no distension and no mass. There is no tenderness. There is no rebound and no guarding. Musculoskeletal: She exhibits no edema. Lymphadenopathy:     She has no cervical adenopathy. Neurological: She is alert and oriented to person, place, and time. Skin: Skin is warm. Psychiatric: Affect normal.   Nursing note and vitals reviewed.         [address] Outpatient Visit on 03/29/2019   Component Date Value Ref Range Status    LIPID PROFILE 03/29/2019        Final    Cholesterol, total 03/29/2019 126  <200 MG/DL Final    Triglyceride 03/29/2019 137  <150 MG/DL Final    HDL Cholesterol 03/29/2019 45  40 - 60 MG/DL Final    LDL, calculated 03/29/2019 53.6  0 - 100 MG/DL Final    VLDL, calculated 03/29/2019 27.4  MG/DL Final    CHOL/HDL Ratio 03/29/2019 2.8  0 - 5.0   Final    WBC 03/29/2019 4.8  4.6 - 13.2 K/uL Final    RBC 03/29/2019 4.21  4.20 - 5.30 M/uL Final    HGB 03/29/2019 13.0  12.0 - 16.0 g/dL Final    HCT 03/29/2019 39.3  35.0 - 45.0 % Final    MCV 03/29/2019 93.3  74.0 - 97.0 FL Final    MCH 03/29/2019 30.9  24.0 - 34.0 PG Final    MCHC 03/29/2019 33.1  31.0 - 37.0 g/dL Final    RDW 03/29/2019 13.4  11.6 - 14.5 % Final    PLATELET 10/29/0156 031  135 - 420 K/uL Final    MPV 03/29/2019 10.2  9.2 - 11.8 FL Final    NEUTROPHILS 03/29/2019 54  40 - 73 % Final    LYMPHOCYTES 03/29/2019 31  21 - 52 % Final    MONOCYTES 03/29/2019 11* 3 - 10 % Final    EOSINOPHILS 03/29/2019 4  0 - 5 % Final    BASOPHILS 03/29/2019 0  0 - 2 % Final    ABS. NEUTROPHILS 03/29/2019 2.6  1.8 - 8.0 K/UL Final    ABS. LYMPHOCYTES 03/29/2019 1.5  0.9 - 3.6 K/UL Final    ABS. MONOCYTES 03/29/2019 0.5  0.05 - 1.2 K/UL Final    ABS. EOSINOPHILS 03/29/2019 0.2  0.0 - 0.4 K/UL Final    ABS. BASOPHILS 03/29/2019 0.0  0.0 - 0.1 K/UL Final    DF 03/29/2019 AUTOMATED    Final    Sodium 03/29/2019 139  136 - 145 mmol/L Final    Potassium 03/29/2019 4.0  3.5 - 5.5 mmol/L Final    Chloride 03/29/2019 101  100 - 108 mmol/L Final    CO2 03/29/2019 29  21 - 32 mmol/L Final    Anion gap 03/29/2019 9  3.0 - 18 mmol/L Final    Glucose 03/29/2019 106* 74 - 99 mg/dL Final    BUN 03/29/2019 17  7.0 - 18 MG/DL Final    Creatinine 03/29/2019 0.81  0.6 - 1.3 MG/DL Final    BUN/Creatinine ratio 03/29/2019 21* 12 - 20   Final    GFR est AA 03/29/2019 >60  >60 ml/min/1.73m2 Final    GFR est non-AA 03/29/2019 >60  >60 ml/min/1.73m2 Final    Calcium 03/29/2019 8.9  8.5 - 10.1 MG/DL Final    Bilirubin, total 03/29/2019 0.3  0.2 - 1.0 MG/DL Final    ALT (SGPT) 03/29/2019 32  13 - 56 U/L Final    AST (SGOT) 03/29/2019 19  15 - 37 U/L Final    Alk. phosphatase 03/29/2019 57  45 - 117 U/L Final    Protein, total 03/29/2019 6.6  6.4 - 8.2 g/dL Final    Albumin 03/29/2019 3.8  3.4 - 5.0 g/dL Final    Globulin 03/29/2019 2.8  2.0 - 4.0 g/dL Final    A-G Ratio 03/29/2019 1.4  0.8 - 1.7   Final    Hemoglobin A1c 03/29/2019 6.0* 4.2 - 5.6 % Final         CT Results (most recent):  No results found for this or any previous visit. XR Results (most recent):   No results found for this or any previous visit. CT   All Micro Results     None                DIAGNOSIS AND PLAN  Patient Active Problem List   Diagnosis Code    Breast tumor D49.3    Chronic pain G89.29    Hypercholesterolemia E78.00    Arthritis M19.90    History of recurrent UTIs Z87.440    Overactive bladder N32.81     1. Fatty liver  2. LEVY (nonalcoholic steatohepatitis)  Normal LFTs  - US ABD LTD; Future    3. Hypercholesterolemia  Controlled with diet. Last LDL 53, 3/2019. 4. Arthritis  Pain controlled with NSAIDs. 5. Prediabetes  HbA1c 6.o on recent labs in March 2019. C/w diet and life style recommendations, weight loss encouraged. 6. Osteopenia, unspecified location  Following with rheum, last DEXA approx 2 years ago per patient, will defer to them next order when due. C/w management with vit D and calcium      Follow-up and Dispositions    · Return in about 4 months (around 8/10/2019). Elen Levin MD

## 2019-04-18 ENCOUNTER — HOSPITAL ENCOUNTER (OUTPATIENT)
Dept: ULTRASOUND IMAGING | Age: 79
Discharge: HOME | End: 2019-04-18
Attending: INTERNAL MEDICINE
Payer: MEDICARE

## 2019-04-18 DIAGNOSIS — K75.81 NASH (NONALCOHOLIC STEATOHEPATITIS): ICD-10-CM

## 2019-04-18 DIAGNOSIS — K76.0 FATTY LIVER: ICD-10-CM

## 2019-04-18 PROCEDURE — 76705 ECHO EXAM OF ABDOMEN: CPT

## 2019-04-29 RX ORDER — SIMVASTATIN 20 MG/1
TABLET, FILM COATED ORAL
Qty: 90 TAB | Refills: 3 | Status: SHIPPED | OUTPATIENT
Start: 2019-04-29 | End: 2020-07-21 | Stop reason: SDUPTHER

## 2019-06-03 ENCOUNTER — TELEPHONE (OUTPATIENT)
Dept: INTERNAL MEDICINE CLINIC | Age: 79
End: 2019-06-03

## 2019-06-03 NOTE — TELEPHONE ENCOUNTER
Please let her know her abdominal US is still consistent with : Mild diffuse hepatic steatosis without focal abnormalities (fatty liver). Recommendation for weight loss and low cholesterol diet is recommended.  Thanks

## 2019-06-03 NOTE — TELEPHONE ENCOUNTER
Pt is wanting to know what her [address] [place] [address] showed that was done on 4/18/19, she hasn't received any calls regarding result. Please call her at [phone].

## 2019-06-03 NOTE — TELEPHONE ENCOUNTER
Tried to call, no answer. LMTCB  Ultrasound showed evidence of fatty liver, no other abnormalities noted.  Treatment is weight loss and low cholesterol diet

## 2019-06-12 LAB — AMB DEXA, EXTERNAL: NORMAL

## 2019-07-01 ENCOUNTER — HOSPITAL ENCOUNTER (OUTPATIENT)
Age: 79
Discharge: HOME | End: 2019-07-01
Attending: PODIATRIST
Payer: MEDICARE

## 2019-07-01 DIAGNOSIS — M19.071 OSTEOARTHRITIS OF RIGHT FOOT: ICD-10-CM

## 2019-07-01 PROCEDURE — 73718 MRI LOWER EXTREMITY W/O DYE: CPT

## 2019-07-26 DIAGNOSIS — R20.0 NUMBNESS AND TINGLING: ICD-10-CM

## 2019-07-26 DIAGNOSIS — M19.90 OSTEOARTHRITIS, UNSPECIFIED OSTEOARTHRITIS TYPE, UNSPECIFIED SITE: ICD-10-CM

## 2019-07-26 DIAGNOSIS — R20.2 NUMBNESS AND TINGLING: ICD-10-CM

## 2019-07-26 RX ORDER — CYCLOBENZAPRINE HCL 10 MG
10 TABLET ORAL
Qty: 90 TAB | Refills: 0 | Status: SHIPPED | OUTPATIENT
Start: 2019-07-26 | End: 2019-11-13 | Stop reason: SDUPTHER

## 2019-07-26 RX ORDER — CYCLOBENZAPRINE HCL 10 MG
TABLET ORAL
Qty: 90 TAB | Refills: 2 | Status: CANCELLED | OUTPATIENT
Start: 2019-07-26

## 2019-07-26 NOTE — TELEPHONE ENCOUNTER
Last Visit: 4/10/19 with MD Danisha Hodge  Next Appointment: 8/28/19 with MD Velasquez  Previous Refill Encounter(s): 8/15/18 #90 with 2 refills    Requested Prescriptions     Pending Prescriptions Disp Refills    cyclobenzaprine (FLEXERIL) 10 mg tablet 90 Tab 2     Sig: Take 1 Tab by mouth every eight (8) hours as needed for Muscle Spasm(s).

## 2019-08-28 ENCOUNTER — HOSPITAL ENCOUNTER (OUTPATIENT)
Dept: LAB | Age: 79
Discharge: HOME | End: 2019-08-28
Payer: MEDICARE

## 2019-08-28 ENCOUNTER — OFFICE VISIT (OUTPATIENT)
Dept: INTERNAL MEDICINE CLINIC | Age: 79
End: 2019-08-28

## 2019-08-28 VITALS
SYSTOLIC BLOOD PRESSURE: 127 MMHG | WEIGHT: 163.2 LBS | HEIGHT: 63 IN | TEMPERATURE: 98 F | DIASTOLIC BLOOD PRESSURE: 71 MMHG | BODY MASS INDEX: 28.92 KG/M2 | OXYGEN SATURATION: 95 % | HEART RATE: 84 BPM | RESPIRATION RATE: 14 BRPM

## 2019-08-28 DIAGNOSIS — K75.81 NASH (NONALCOHOLIC STEATOHEPATITIS): ICD-10-CM

## 2019-08-28 DIAGNOSIS — R59.1 LYMPHADENOPATHY: ICD-10-CM

## 2019-08-28 DIAGNOSIS — M19.90 ARTHRITIS: Primary | ICD-10-CM

## 2019-08-28 DIAGNOSIS — Z00.00 INITIAL MEDICARE ANNUAL WELLNESS VISIT: ICD-10-CM

## 2019-08-28 DIAGNOSIS — L30.9 DERMATITIS: ICD-10-CM

## 2019-08-28 DIAGNOSIS — M19.90 ARTHRITIS: ICD-10-CM

## 2019-08-28 DIAGNOSIS — Z71.89 ADVANCE CARE PLANNING: ICD-10-CM

## 2019-08-28 DIAGNOSIS — R73.02 IMPAIRED GLUCOSE TOLERANCE: ICD-10-CM

## 2019-08-28 DIAGNOSIS — M48.9 MASS OF THORACIC VERTEBRA: ICD-10-CM

## 2019-08-28 DIAGNOSIS — E78.00 HYPERCHOLESTEROLEMIA: ICD-10-CM

## 2019-08-28 DIAGNOSIS — F41.9 ANXIETY: ICD-10-CM

## 2019-08-28 DIAGNOSIS — M51.9 THORACIC DISC DISEASE: ICD-10-CM

## 2019-08-28 PROBLEM — M48.061 LUMBAR SPINAL STENOSIS: Status: ACTIVE | Noted: 2019-08-28

## 2019-08-28 PROBLEM — I65.29 CAROTID ARTERY STENOSIS: Status: ACTIVE | Noted: 2019-08-28

## 2019-08-28 PROBLEM — K57.90 DIVERTICULOSIS: Status: ACTIVE | Noted: 2019-08-28

## 2019-08-28 PROBLEM — K29.70 GASTRITIS: Status: ACTIVE | Noted: 2019-08-28

## 2019-08-28 PROBLEM — M67.911 TENDINOPATHY OF RIGHT ROTATOR CUFF: Status: ACTIVE | Noted: 2019-08-28

## 2019-08-28 PROCEDURE — 86225 DNA ANTIBODY NATIVE: CPT

## 2019-08-28 PROCEDURE — 86140 C-REACTIVE PROTEIN: CPT

## 2019-08-28 PROCEDURE — 83520 IMMUNOASSAY QUANT NOS NONAB: CPT

## 2019-08-28 PROCEDURE — 36415 COLL VENOUS BLD VENIPUNCTURE: CPT

## 2019-08-28 RX ORDER — LANOLIN ALCOHOL/MO/W.PET/CERES
1 CREAM (GRAM) TOPICAL DAILY
COMMUNITY
End: 2020-02-28 | Stop reason: SDUPTHER

## 2019-08-28 RX ORDER — NYSTATIN AND TRIAMCINOLONE ACETONIDE 100000; 1 [USP'U]/G; MG/G
OINTMENT TOPICAL 2 TIMES DAILY
Qty: 30 G | Refills: 5 | Status: SHIPPED | OUTPATIENT
Start: 2019-08-28

## 2019-08-28 RX ORDER — LANOLIN ALCOHOL/MO/W.PET/CERES
3 CREAM (GRAM) TOPICAL
COMMUNITY

## 2019-08-28 RX ORDER — GABAPENTIN 300 MG/1
300 CAPSULE ORAL 2 TIMES DAILY
Qty: 60 CAP | Refills: 5 | Status: SHIPPED | OUTPATIENT
Start: 2019-08-28

## 2019-08-28 NOTE — PROGRESS NOTES
INTERNISTS OF [facility]:  8/28/2019, MRN: [id number]      Nighat Lawrence is a 67 y.o. female and presents to clinic for [address] ([address]); Diabetes; and Hypertension    Subjective: The patient is a 80-year-old female with history of carotid artery stenosis,  LEVY, gastritis (10/17/15), diverticulosis, prediabetes, thoracic disc disease, lumbar disc disease, osteoarthritis per EHR (followed by ), right rotator cuff tendinopathy, lumbar spinal stenosis, HLD, and OAB per EHR. 1.  Lymphadenopathy: The patient had a chest CTA in 2015. Findings are listed below. The radiologist recommended a follow-up study to ensure resolution of her lymphadenopathy seen on chest CTA. No additional studies of her chest have been obtained since then along her chest.  No fever/chills. Her last CBC was unremarkable. 11/30/15 Chest CTA: Nonspecific prominent pretracheal lymph nodes likely reactive. Recommend followup in 6 months with intravenous contrast to ensure stability. Mild degenerative changes of the thoracic spine without evidence of acute compression fracture or lesion. No discrete rib lesions identified. 2. LEVY, Prediabetes, and HLD: She is taking simvastatin. No adverse side effects of taking this medicine. She also has a history of prediabetes. She is not really exercising or dieting. Her weight is 163lbs. 4/18/19 RUQ Ultrasound: Mild diffuse hepatic steatosis without focal abnormalities. No biliary abnormalities are seen. The right kidney is normal.  The pancreas is incompletely seen but the visualized portion is unremarkable. 3. Thoracic Lesion: The patient had a thoracic spine MRI in 2016. Findings are listed below. The radiologist recommended additional imaging studies given an area of concern along his T7 vertebral body. No additional studies were obtained though. 2/8/16 Thoracic MRI: Multilevel thoracic spondylosis and disc protrusions as described above.   Relatively severe right foraminal narrowing at T10/T11 secondary to combination of disc, bony changes and alignment abnormality as described in detail above. T1 and T2 hypointense, slightly STIR hyperintense lesion in the right T7 vertebral body, not further characterized.  Consider CT correlation.  This is likely too small for bone scan correlation to evaluate for active lesions. 4. \"Arthritis:\" She is followed by 's office. She is treated for \"arthritis. \"  She takes celebrex, prescribed by . It works well to control her sx. She reports pain along her right foot \"all over. \" She uses CBD and \"it is helping tremendously. \" Getting up and and down causes worsening of her chronic pain. She had surgery along her right hand and has chronic pain along the surgical site. She has pain along hip jts and b/l knee jts off/on. No swelling. She plans to schedule an apt with an orthopedic surgeon. 5. Chronic Lower Back Pain: She has a h/o lumbar disc disease, thoracic disc disease, and lumbar spinal stenosis. She is taking gabapentin 300mg bid. No adverse side effects from this rx other than drowsiness. Pain does not radiate down her legs. No paresthesias other than occasional tingling in her toes off/on. 6. Anxiety: Taking effexor and buspar. No adverse side effects. She feels that these rx are working well to control her sx.     7. HTN: She takes HCTZ daily. No adverse side effects from this rx. 8. Skin Infection: She is asking for a refill for nystatin ointment for a fungal skin infection along her right groin. She has b/l sx. She has no sx today though.        Patient Active Problem List    Diagnosis Date Noted    Impaired glucose tolerance 08/28/2019    Diverticulosis 08/28/2019    LEVY (nonalcoholic steatohepatitis) 08/28/2019    Carotid artery stenosis 08/28/2019    Gastritis 08/28/2019    Lumbar disc disease 08/28/2019    Lumbar spinal stenosis 08/28/2019    Tendinopathy of right rotator cuff 08/28/2019    Thoracic disc disease 08/28/2019    History of recurrent UTIs 05/16/2018    Overactive bladder 05/16/2018    Hypercholesterolemia     Arthritis        Current Outpatient Medications   Medication Sig Dispense Refill    melatonin 3 mg tablet Take 3 mg by mouth nightly.  glucosamine-chondroitin 500-400 mg tablet Take 1 Tab by mouth daily.  cyclobenzaprine (FLEXERIL) 10 mg tablet Take 1 Tab by mouth every eight (8) hours as needed for Muscle Spasm(s). 90 Tab 0    simvastatin (ZOCOR) 20 mg tablet take 1 tablet by mouth at bedtime 90 Tab 3    hydroCHLOROthiazide (HYDRODIURIL) 25 mg tablet take 1 tablet by mouth once daily at bedtime 90 Tab 1    VITAMIN D2 50,000 unit capsule TAKE 1 CAPSULE BY MOUTH EVERY THURSDAY. 12 Cap 3    gabapentin (NEURONTIN) 300 mg capsule Take 1 Cap by mouth four (4) times daily. (Patient taking differently: Take 300 mg by mouth daily.) 120 Cap 3    busPIRone (BUSPAR) 10 mg tablet Take 1 Tab by mouth three (3) times daily. 90 Tab 3    diclofenac (VOLTAREN) 1 % gel Apply  to affected area four (4) times daily. 100 g 1    desonide (TRIDESILON) 0.05 % topical lotion Apply  to affected area two (2) times a day.  psyllium seed, with sugar, (FIBER SUPPLEMENT PO) Take  by mouth daily.  clobetasol (TEMOVATE) 0.05 % external solution Apply  to affected area two (2) times daily as needed. 0    nystatin-triamcinolone (MYCOLOG) 100,000-0.1 unit/gram-% ointment Apply  to affected area daily as needed.  tacrolimus (PROTOPIC) 0.1 % ointment Apply  to affected area two (2) times daily as needed.  celecoxib (CELEBREX) 200 mg capsule Take 200 mg by mouth two (2) times a day.  0    ketoconazole (NIZORAL) 2 % shampoo Apply  to affected area as needed. 0    raloxifene (EVISTA) 60 mg tablet Take 60 mg by mouth daily.  venlafaxine-SR (EFFEXOR-XR) 75 mg capsule Take 75 mg by mouth daily.   0    furosemide (LASIX) 20 mg tablet Take by mouth daily as needed for severe swelling in legs only 30 Tab 0       Allergies   Allergen Reactions    Hydrocodone Bitartrate Nausea Only    Ibuprofen Other (comments)     Platelets drop     Iodinated Contrast Media Other (comments) and Swelling    Iodine Swelling    Nsaids (Non-Steroidal Anti-Inflammatory Drug) Other (comments)     DECREASED PLATELETS       Past Medical History:   Diagnosis Date    Arthritis     Breast tumor     Chronic pain     Hypercholesterolemia        No past surgical history on file. Family History   Problem Relation Age of Onset    Hypertension Mother     Diabetes Mother     Stroke Father     Heart Disease Brother        Social History     Tobacco Use    Smoking status: Former Smoker     Types: Cigarettes    Smokeless tobacco: Never Used    Tobacco comment: in college   Substance Use Topics    Alcohol use: Yes     Alcohol/week: 1.0 standard drinks     Types: 1 Glasses of wine per week     Comment: socially       ROS   Review of Systems   Constitutional: Negative for chills and fever. HENT: Negative for ear pain and sore throat. Eyes: Negative for blurred vision and pain. Respiratory: Negative for cough and shortness of breath. Cardiovascular: Negative for chest pain. Gastrointestinal: Negative for abdominal pain, blood in stool and melena. Genitourinary: Negative for dysuria and hematuria. Musculoskeletal: Positive for back pain (chronic) and joint pain (right foot pain, chronic, followed by podiatry - Gopal Lind). Negative for myalgias. Skin: Negative for rash. Neurological: Positive for tingling (chronic). Negative for focal weakness and headaches. Endo/Heme/Allergies: Does not bruise/bleed easily. Psychiatric/Behavioral: Negative for substance abuse.        Objective     Vitals:    08/28/19 0900   BP: 127/71   Pulse: 84   Resp: 14   Temp: 98 °F (36.7 °C)   TempSrc: Oral   SpO2: 95%   Weight: 163 lb 3.2 oz (74 kg)   Height: 5' 3\" (1.6 m)   PainSc:   0 - No pain       Physical Exam   Constitutional: She is oriented to person, place, and time and well-developed, well-nourished, and in no distress. HENT:   Head: Normocephalic and atraumatic. Right Ear: External ear normal.   Left Ear: External ear normal.   Nose: Nose normal.   Mouth/Throat: Oropharynx is clear and moist. No oropharyngeal exudate. Eyes: Conjunctivae and EOM are normal. Right eye exhibits no discharge. Left eye exhibits no discharge. No scleral icterus. Neck: Neck supple. Cardiovascular: Normal rate, regular rhythm, normal heart sounds and intact distal pulses. Exam reveals no gallop and no friction rub. No murmur heard. Pulmonary/Chest: Effort normal and breath sounds normal. No respiratory distress. She has no wheezes. She has no rales. Abdominal: Soft. Bowel sounds are normal. She exhibits no distension. There is no tenderness. There is no rebound and no guarding. Musculoskeletal: She exhibits no edema or tenderness (Bue). No effusions are present. All spinous processes were tender palpation except along her cervical spine. No paraspinal muscles were tender palpation. Lymphadenopathy:     She has no cervical adenopathy. Neurological: She is alert and oriented to person, place, and time. She exhibits normal muscle tone. Gait normal.   Skin: Skin is warm and dry. No erythema. Psychiatric: Affect normal.   Nursing note and vitals reviewed.       LABS   Data Review:   Lab Results   Component Value Date/Time    WBC 4.8 03/29/2019 09:02 AM    HGB 13.0 03/29/2019 09:02 AM    HCT 39.3 03/29/2019 09:02 AM    PLATELET 764 17/99/3538 09:02 AM    MCV 93.3 03/29/2019 09:02 AM       Lab Results   Component Value Date/Time    Sodium 139 03/29/2019 09:02 AM    Potassium 4.0 03/29/2019 09:02 AM    Chloride 101 03/29/2019 09:02 AM    CO2 29 03/29/2019 09:02 AM    Anion gap 9 03/29/2019 09:02 AM    Glucose 106 (H) 03/29/2019 09:02 AM    BUN 17 03/29/2019 09:02 AM    Creatinine 0.81 03/29/2019 09:02 AM BUN/Creatinine ratio 21 (H) 03/29/2019 09:02 AM    GFR est AA >60 03/29/2019 09:02 AM    GFR est non-AA >60 03/29/2019 09:02 AM    Calcium 8.9 03/29/2019 09:02 AM       Lab Results   Component Value Date/Time    Cholesterol, total 126 03/29/2019 09:02 AM    HDL Cholesterol 45 03/29/2019 09:02 AM    LDL, calculated 53.6 03/29/2019 09:02 AM    VLDL, calculated 27.4 03/29/2019 09:02 AM    Triglyceride 137 03/29/2019 09:02 AM    CHOL/HDL Ratio 2.8 03/29/2019 09:02 AM       Lab Results   Component Value Date/Time    Hemoglobin A1c 6.0 (H) 03/29/2019 09:02 AM       Assessment/Plan:   1. Arthritis: Likely from OA  -Activity as tolerated. -Checking a CRP, MJ, and RF.  -Placing a referral to Rheumatology per patient request.    ORDERS:  - C REACTIVE PROTEIN, QT; Future  - MJ COMPREHENSIVE PANEL; Future  - RHEUMASSURE; Future  - REFERRAL TO RHEUMATOLOGY    2. Mass of thoracic vertebra:  +Thoracic/lumbar disc disease hx.   -Ordering a thoracic x-ray series. - Refilling her gabapentin.  -A controlled substance agreement was completed today. ORDERS:  - XR SPINE THORAC 2 V; Future  - gabapentin (NEURONTIN) 300 mg capsule; Take 1 Cap by mouth two (2) times a day. Max Daily Amount: 600 mg. Dispense: 60 Cap; Refill: 5    3. Anxiety: Stable. -Continue with medication as prescribed. 4. H/o Fungal Skin Infections: Asymptomatic.  - We will refill her nystatin topical Rx as requested. 5. HLD, Prediabetes, and LEVY:   -I encouraged her to reduce her processed food intake. I will recheck her weight at her follow-up appointment. 6. LAD: Incidental finding on imaging studies. Observation for now. 7.  Hypertension: Stable. -Continue with rx as prescribed. Health Maintenance Due   Topic Date Due    MEDICARE YEARLY EXAM  03/14/2018     Lab review: labs are reviewed in the EHR    I have discussed the diagnosis with the patient and the intended plan as seen in the above orders.   The patient has received an after-visit summary and questions were answered concerning future plans. I have discussed medication side effects and warnings with the patient as well. I have reviewed the plan of care with the patient, accepted their input and they are in agreement with the treatment goals. All questions were answered. The patient understands the plan of care. Handouts provided today with above information. Pt instructed if symptoms worsen to call the office or report to the ED for continued care. Greater than 50% of the visit time was spent in counseling and/or coordination of care. Voice recognition was used to generate this report, which may have resulted in some phonetic based errors in grammar and contents. Even though attempts were made to correct all the mistakes, some may have been missed, and remained in the body of the document.           Shivani Golden MD

## 2019-08-28 NOTE — PROGRESS NOTES
Chief Complaint   Patient presents with   [address] [facility]  4    Diabetes    Hypertension       1. Have you been to the ER, urgent care clinic since your last visit? Hospitalized since your last visit? No    2. Have you seen or consulted any other health care providers outside of the [address] [place] [address] since your last visit? Include any pap smears or colon screening. No    Patient was given a copy of the Advanced Directive and understands to bring it in once completed.   Health Maintenance Due   Topic Date Due    MEDICARE YEARLY EXAM  03/14/2018

## 2019-08-28 NOTE — PATIENT INSTRUCTIONS
Health Maintenance Due   Topic Date Due    MEDICARE YEARLY EXAM  03/14/2018          Arthritis: Care Instructions  Your Care Instructions  Arthritis, also called osteoarthritis, is a breakdown of the cartilage that cushions your joints. When the cartilage wears down, your bones rub against each other. This causes pain and stiffness. Many people have some arthritis as they age. Arthritis most often affects the joints of the spine, hands, hips, knees, or feet. You can take simple measures to protect your joints, ease your pain, and help you stay active. Follow-up care is a key part of your treatment and safety. Be sure to make and go to all appointments, and call your doctor if you are having problems. It's also a good idea to know your test results and keep a list of the medicines you take. How can you care for yourself at home? · Stay at a healthy weight. Being overweight puts extra strain on your joints. · Talk to your doctor or physical therapist about exercises that will help ease joint pain. ? Stretch. You may enjoy gentle forms of yoga to help keep your joints and muscles flexible. ? Walk instead of jog. Other types of exercise that are less stressful on the joints include riding a bicycle, swimming, patience chi, or water exercise. ? Lift weights. Strong muscles help reduce stress on your joints. Stronger thigh muscles, for example, take some of the stress off of the knees and hips. Learn the right way to lift weights so you do not make joint pain worse. · Take your medicines exactly as prescribed. Call your doctor if you think you are having a problem with your medicine. · Take pain medicines exactly as directed. ? If the doctor gave you a prescription medicine for pain, take it as prescribed. ? If you are not taking a prescription pain medicine, ask your doctor if you can take an over-the-counter medicine. · Use a cane, crutch, walker, or another device if you need help to get around.  These can help rest your joints. You also can use other things to make life easier, such as a higher toilet seat and padded handles on kitchen utensils. · Do not sit in low chairs, which can make it hard to get up. · Put heat or cold on your sore joints as needed. Use whichever helps you most. You also can take turns with hot and cold packs. ? Apply heat 2 or 3 times a day for 20 to 30 minutes--using a heating pad, hot shower, or hot pack--to relieve pain and stiffness. ? Put ice or a cold pack on your sore joint for 10 to 20 minutes at a time. Put a thin cloth between the ice and your skin. When should you call for help? Call your doctor now or seek immediate medical care if:    · You have sudden swelling, warmth, or pain in any joint.     · You have joint pain and a fever or rash.     · You have such bad pain that you cannot use a joint.    Watch closely for changes in your health, and be sure to contact your doctor if:    · You have mild joint symptoms that continue even with more than 6 weeks of care at home.     · You have stomach pain or other problems with your medicine. Where can you learn more? Go to http://robbin-duglas.info/. Enter D967 in the search box to learn more about \"Arthritis: Care Instructions. \"  Current as of: April 1, 2019  Content Version: 12.1  © 4759-6487 [employer]. Care instructions adapted under license by [employer] (which disclaims liability or warranty for this information). If you have questions about a medical condition or this instruction, always ask your healthcare professional. [place] [address] any warranty or liability for your use of this information. Medicare Part B Preventive Services Limitations Recommendation Scheduled   Bone Mass Measurement  (age 72 & older, biennial) Requires diagnosis related to osteoporosis or estrogen deficiency.  Biennial benefit unless patient has history of long-term glucocorticoid tx or baseline is needed because initial test was by other method This should be done at age 72 and again if on osteoporosis medication at 2-3 year intervals. Cardiovascular Screening Blood Tests (every 5 years)  Total cholesterol, HDL, Triglycerides Order as a panel if possible We should check your cholesterol panel at least once every 5 years. Colorectal Cancer Screening  -Fecal occult blood test (annual)  -Flexible sigmoidoscopy (5y)  -Screening colonoscopy (10y)  -Barium Enema  Due per your Gastroenterologist's recommendations. Counseling to Prevent Tobacco Use (up to 8 sessions per year)  - Counseling greater than 3 and up to 10 minutes  - Counseling greater than 10 minutes Patients must be asymptomatic of tobacco-related conditions to receive as preventive service Continue with smoking cessation    Diabetes Screening Tests (at least every 3 years, Medicare covers annually or at 6-month intervals for prediabetic patients)    Fasting blood sugar (FBS) or glucose tolerance test (GTT) Patient must be diagnosed with one of the following:  -Hypertension, Dyslipidemia, obesity, previous impaired FBS or GTT  Or any two of the following: overweight, FH of diabetes, age ? 72, history of gestational diabetes, birth of baby weighing more than 9 pounds Should be done yearly    Diabetes Self-Management Training (DSMT) (no USPSTF recommendation) Requires referral by treating physician for patient with diabetes or renal disease. 10 hours of initial DSMT session of no less than 30 minutes each in a continuous 12-month period. 2 hours of follow-up DSMT in subsequent years. Not applicable    Glaucoma Screening (no USPSTF recommendation) Diabetes mellitus, family history, , age 48 or over,  American, age 72 or over Continue with annual eye exams.     Human Immunodeficiency Virus (HIV) Screening (annually for increased risk patients)  HIV-1 and HIV-2 by EIA, MORRIS, rapid antibody test, or oral mucosa transudate Patient must be at increased risk for HIV infection per USPSTF guidelines or pregnant. Tests covered annually for patients at increased risk. Pregnant patients may receive up to 3 test during pregnancy. Not applicable    Medical Nutrition Therapy (MNT) (for diabetes or renal disease not recommended schedule) Requires referral by treating physician for patient with diabetes or renal disease. Can be provided in same year as diabetes self-management training (DSMT), and CMS recommends medical nutrition therapy take place after DSMT. Up to 3 hours for initial year and 2 hours in subsequent years. Not applicable    Shingles Vaccination A shingles vaccine is also recommended once in a lifetime after age 61 Vaccination recommended for shingles vaccination. Seasonal Influenza Vaccination (annually)  Continue with yearly \"flu\" shot annually    Pneumococcal Vaccination (once after 65)  Please receive this vaccination at age 72. Hepatitis B Vaccinations (if medium/high risk) Medium/high risk factors:  End-stage renal disease,  Hemophiliacs who received Factor VIII or IX concentrates, Clients of institutions for the mentally retarded, Persons who live in the same house as a HepB virus carrier, Homosexual men, Illicit injectable drug abusers. Not applicable    Screening Mammography (biennial age 54-69) Annually (age 36 or over) You need a mammogram yearly to screen for breast cancer. Screening Pap Tests and Pelvic Examination (up to age 79 and after 79 if unknown history or abnormal study last 10 years) Every 24 months except high risk You need no Pap smear at this time. Ultrasound Screening for Abdominal Aortic Aneurysm (AAA) (once) Patient must be referred through IPPE and not have had a screening for abdominal aortic aneurysm before under Medicare.   Limited to patients who meet one of the following criteria:  - Men who are 73-68 years old and have smoked more than 100 cigarettes in their lifetime.  -Anyone with a FH of AAA  -Anyone recommended for screening by USPSTF Not applicable            Medicare Wellness Visit, Female     The best way to live healthy is to have a lifestyle where you eat a well-balanced diet, exercise regularly, limit alcohol use, and quit all forms of tobacco/nicotine, if applicable. Regular preventive services are another way to keep healthy. Preventive services (vaccines, screening tests, monitoring & exams) can help personalize your care plan, which helps you manage your own care. Screening tests can find health problems at the earliest stages, when they are easiest to treat. [facility] follows the current, evidence-based guidelines published by the [facility] Genaro Talamantes ([facility]) when recommending preventive services for our patients. Because we follow these guidelines, sometimes recommendations change over time as research supports it. (For example, mammograms used to be recommended annually. Even though Medicare will still pay for an annual mammogram, the newer guidelines recommend a mammogram every two years for women of average risk.)  Of course, you and your doctor may decide to screen more often for some diseases, based on your risk and your health status. Preventive services for you include:  - Medicare offers their members a free annual wellness visit, which is time for you and your primary care provider to discuss and plan for your preventive service needs. Take advantage of this benefit every year!  -All adults over the age of 72 should receive the recommended pneumonia vaccines. Current USPSTF guidelines recommend a series of two vaccines for the best pneumonia protection.   -All adults should have a flu vaccine yearly and a tetanus vaccine every 10 years. All adults age 61 and older should receive a shingles vaccine once in their lifetime.    -A bone mass density test is recommended when a woman turns 65 to screen for osteoporosis. This test is only recommended one time, as a screening. Some providers will use this same test as a disease monitoring tool if you already have osteoporosis. -All adults age 38-68 who are overweight should have a diabetes screening test once every three years.   -Other screening tests and preventive services for persons with diabetes include: an eye exam to screen for diabetic retinopathy, a kidney function test, a foot exam, and stricter control over your cholesterol.   -Cardiovascular screening for adults with routine risk involves an electrocardiogram (ECG) at intervals determined by your doctor.   -Colorectal cancer screenings should be done for adults age 54-65 with no increased risk factors for colorectal cancer. There are a number of acceptable methods of screening for this type of cancer. Each test has its own benefits and drawbacks. Discuss with your doctor what is most appropriate for you during your annual wellness visit. The different tests include: colonoscopy (considered the best screening method), a fecal occult blood test, a fecal DNA test, and sigmoidoscopy. -Breast cancer screenings are recommended every other year for women of normal risk, age 54-69.  -Cervical cancer screenings for women over age 72 are only recommended with certain risk factors.   -All adults born between [facility] should be screened once for Hepatitis C.      Here is a list of your current Health Maintenance items (your personalized list of preventive services) with a due date:  Health Maintenance Due   Topic Date Due    Annual Well Visit  03/14/2018    Flu Vaccine  08/01/2019

## 2019-08-29 LAB
CENTROMERE B AB SER-ACNC: <0.2 AI (ref 0–0.9)
CHROMATIN AB SERPL-ACNC: <0.2 AI (ref 0–0.9)
CRP SERPL-MCNC: 0.4 MG/DL (ref 0–0.3)
DSDNA AB SER-ACNC: <1 IU/ML (ref 0–9)
ENA JO1 AB SER-ACNC: <0.2 AI (ref 0–0.9)
ENA RNP AB SER-ACNC: <0.2 AI (ref 0–0.9)
ENA SCL70 AB SER-ACNC: <0.2 AI (ref 0–0.9)
ENA SM AB SER-ACNC: <0.2 AI (ref 0–0.9)
ENA SS-A AB SER-ACNC: <0.2 AI (ref 0–0.9)
ENA SS-B AB SER-ACNC: <0.2 AI (ref 0–0.9)
SEE BELOW, 164869: NORMAL

## 2019-09-02 PROBLEM — F41.9 ANXIETY: Status: ACTIVE | Noted: 2019-09-02

## 2019-09-02 NOTE — PROGRESS NOTES
[facility]:  09/02/19, [id number]      The Initial Medicare Annual Wellness Exam PROGRESS NOTE    This is an Initial Medicare Annual Wellness Exam (AWV). I have reviewed the patient's medical history in detail and updated the computerized patient record. Delroy Graham is a 67 y.o.  female and presents for an annual wellness exam.    SUBJECTIVE  The pt has no acute complaints today. Past Medical History:   Diagnosis Date    Arthritis     Breast tumor     Chronic pain     Hypercholesterolemia       No past surgical history on file. Current Outpatient Medications   Medication Sig Dispense Refill    melatonin 3 mg tablet Take 3 mg by mouth nightly.  glucosamine-chondroitin 500-400 mg tablet Take 1 Tab by mouth daily.  gabapentin (NEURONTIN) 300 mg capsule Take 1 Cap by mouth two (2) times a day. Max Daily Amount: 600 mg. 60 Cap 5    nystatin-triamcinolone (MYCOLOG) 100,000-0.1 unit/gram-% ointment Apply  to affected area two (2) times a day. 30 g 5    cyclobenzaprine (FLEXERIL) 10 mg tablet Take 1 Tab by mouth every eight (8) hours as needed for Muscle Spasm(s). 90 Tab 0    simvastatin (ZOCOR) 20 mg tablet take 1 tablet by mouth at bedtime 90 Tab 3    hydroCHLOROthiazide (HYDRODIURIL) 25 mg tablet take 1 tablet by mouth once daily at bedtime 90 Tab 1    VITAMIN D2 50,000 unit capsule TAKE 1 CAPSULE BY MOUTH EVERY THURSDAY. 12 Cap 3    busPIRone (BUSPAR) 10 mg tablet Take 1 Tab by mouth three (3) times daily. 90 Tab 3    diclofenac (VOLTAREN) 1 % gel Apply  to affected area four (4) times daily. 100 g 1    desonide (TRIDESILON) 0.05 % topical lotion Apply  to affected area two (2) times a day.  psyllium seed, with sugar, (FIBER SUPPLEMENT PO) Take  by mouth daily.  clobetasol (TEMOVATE) 0.05 % external solution Apply  to affected area two (2) times daily as needed.   0    tacrolimus (PROTOPIC) 0.1 % ointment Apply  to affected area two (2) times daily as needed.  celecoxib (CELEBREX) 200 mg capsule Take 200 mg by mouth two (2) times a day.  0    ketoconazole (NIZORAL) 2 % shampoo Apply  to affected area as needed. 0    raloxifene (EVISTA) 60 mg tablet Take 60 mg by mouth daily.  venlafaxine-SR (EFFEXOR-XR) 75 mg capsule Take 75 mg by mouth daily. 0    furosemide (LASIX) 20 mg tablet Take by mouth daily as needed for severe swelling in legs only 30 Tab 0     Allergies   Allergen Reactions    Hydrocodone Bitartrate Nausea Only    Ibuprofen Other (comments)     Platelets drop     Iodinated Contrast Media Other (comments) and Swelling    Iodine Swelling    Nsaids (Non-Steroidal Anti-Inflammatory Drug) Other (comments)     DECREASED PLATELETS     Family History   Problem Relation Age of Onset    Hypertension Mother     Diabetes Mother     Stroke Father     Heart Disease Brother      Social History     Tobacco Use    Smoking status: Former Smoker     Types: Cigarettes    Smokeless tobacco: Never Used    Tobacco comment: in college   Substance Use Topics    Alcohol use:  Yes     Alcohol/week: 1.0 standard drinks     Types: 1 Glasses of wine per week     Comment: socially     Patient Active Problem List   Diagnosis Code    Hypercholesterolemia E78.00    Arthritis M19.90    History of recurrent UTIs Z87.440    Overactive bladder N32.81    Impaired glucose tolerance R73.02    Diverticulosis K57.90    LEVY (nonalcoholic steatohepatitis) K75.81    Carotid artery stenosis I65.29    Gastritis K29.70    Lumbar disc disease M51.9    Lumbar spinal stenosis M48.061    Tendinopathy of right rotator cuff M67.911    Thoracic disc disease M51.9     The patient is a 67-year-old female with history of carotid artery stenosis,  LEVY, gastritis (10/17/15), diverticulosis, prediabetes, thoracic disc disease, lumbar disc disease, osteoarthritis per EHR (followed by ), right rotator cuff tendinopathy, lumbar spinal stenosis, HLD, and OAB per EHR.    Health Maintenance History  Immunizations reviewed: Tdap over-due   Pneumovax: over-due   Flu: over-due  Zoster: up to date    Immunization History   Administered Date(s) Administered    Influenza Vaccine (Tri) Adjuvanted 10/12/2018    Zoster Recombinant 06/06/2018, 07/11/2018       Colonoscopy: Up to date. No hematochezia/melena. We do not have records of her last colonoscopy. Eye exam: Up to date. Mammo: Up to date    Dexascan: Up to date    Pelvic/Pap: Up to date. No vaginal bleeding. Review of Systems   Constitutional: Negative for chills and fever. HENT: Negative for ear pain and sore throat. Eyes: Negative for blurred vision and pain. Respiratory: Negative for cough and shortness of breath. Cardiovascular: Negative for chest pain. Gastrointestinal: Negative for abdominal pain, blood in stool and melena. Genitourinary: Negative for dysuria and hematuria. Musculoskeletal: Positive for back pain and joint pain. Negative for myalgias. Skin: Negative for rash. Neurological: Positive for tingling. Negative for focal weakness and headaches. Endo/Heme/Allergies: Does not bruise/bleed easily. Psychiatric/Behavioral: Negative for substance abuse. Depression Risk Factor Screening:      Patient Health Questionnaire (PHQ-2)   Over the last 2 weeks, how often have you been bothered by any of the following problems? · Little interest or pleasure in doing things? · Not at all. [0]  · Feeling down, depressed, or hopeless? · Not at all. [0]    Total Score: 0/6  PHQ-2 Assessment Scoring:   A score of 2 or more requires further screening with the PHQ-9    Alcohol Risk Factor Screening:   Women:    On any occasion during the past 3 months, have you had more than 3 drinks containing alcohol? no    Do you average more than 7 drinks per week? no    Tobacco Use Screening:     Social History     Tobacco Use   Smoking Status Former Smoker    Types: Cigarettes   Smokeless Tobacco Never Used   Tobacco Comment    in college       Hearing Loss   She does not need audiometry at this time. Activities of Daily Living   Self-care. Requires assistance with: no ADLs  She does not need assistance with ADLs/IADLs. Fall Risk   No falls within the past yr    Abuse Screen   None    Additional Examination Findings:  Vitals:    08/28/19 0900   BP: 127/71   Pulse: 84   Resp: 14   Temp: 98 °F (36.7 °C)   TempSrc: Oral   SpO2: 95%   Weight: 163 lb 3.2 oz (74 kg)   Height: 5' 3\" (1.6 m)   PainSc:   0 - No pain      Body mass index is 28.91 kg/m². Evaluation of Cognitive Function:  Mood/affect: Euthymic  Appearance: Well-groomed      General:   Well-nourished, well-groomed, pleasant, alert, in no acute distress. Head:  Normocephalic, atraumatic  Ears:  External ears WNL  Eyes:  Clear sclera  Neuro:   Alert, conversant, appropriate, following commands, no sensory deficit   Skin:    No rashes noted  Psych:  Affect, mood and judgment appropriate      Dementia Screen (Mini-Cog):   Three Item Recall: passed   Clock Drawing (ten past eleven) Exercise: passed this exercise      LABS   Data Review:   Lab Results   Component Value Date/Time    Sodium 139 03/29/2019 09:02 AM    Potassium 4.0 03/29/2019 09:02 AM    Chloride 101 03/29/2019 09:02 AM    CO2 29 03/29/2019 09:02 AM    Anion gap 9 03/29/2019 09:02 AM    Glucose 106 (H) 03/29/2019 09:02 AM    BUN 17 03/29/2019 09:02 AM    Creatinine 0.81 03/29/2019 09:02 AM    BUN/Creatinine ratio 21 (H) 03/29/2019 09:02 AM    GFR est AA >60 03/29/2019 09:02 AM    GFR est non-AA >60 03/29/2019 09:02 AM    Calcium 8.9 03/29/2019 09:02 AM       Lab Results   Component Value Date/Time    WBC 4.8 03/29/2019 09:02 AM    HGB 13.0 03/29/2019 09:02 AM    HCT 39.3 03/29/2019 09:02 AM    PLATELET 823 09/51/2935 09:02 AM    MCV 93.3 03/29/2019 09:02 AM       Lab Results   Component Value Date/Time    Hemoglobin A1c 6.0 (H) 03/29/2019 09:02 AM       Lab Results   Component Value Date/Time    Cholesterol, total 126 03/29/2019 09:02 AM    HDL Cholesterol 45 03/29/2019 09:02 AM    LDL, calculated 53.6 03/29/2019 09:02 AM    VLDL, calculated 27.4 03/29/2019 09:02 AM    Triglyceride 137 03/29/2019 09:02 AM    CHOL/HDL Ratio 2.8 03/29/2019 09:02 AM       Patient Care Team:  Mariann Price MD as PCP - [facility])    End-of-life planning  Advanced Directive in the case than an injury or illness causes the patient to be unable to make health care decisions was discussed with the patient. Advice/Referrals/Counselling/Plan:   Education and counseling provided:  Are appropriate based on today's review and evaluation  End-of-Life planning (with patient's consent)  Pneumococcal Vaccine  Influenza Vaccine  Screening Mammography  Screening Pap and pelvic (covered once every 2 years)  Colorectal cancer screening tests  Cardiovascular screening blood test  Bone mass measurement (DEXA)  Screening for glaucoma  Diabetes screening test     Include in education list (weight loss, physical activity, smoking cessation, fall prevention, and nutrition)    ICD-10-CM ICD-9-CM    1. Arthritis M19.90 716.90 C REACTIVE PROTEIN, QT      MJ COMPREHENSIVE PANEL      RHEUMASSURE      REFERRAL TO RHEUMATOLOGY   2. Mass of thoracic vertebra R22.2 733.90 XR SPINE THORAC 2 V   3. Thoracic disc disease M51.9 722.92 XR SPINE THORAC 2 V      gabapentin (NEURONTIN) 300 mg capsule     reviewed diet, exercise and weight control. Brief written plan, checklist    Assessment/Plan:    Health Maintenance:  -I encouraged her to get all recommended vaccinations.  -Requesting her last colonoscopy. Lab review: labs are reviewed in the [address] [place] [address] (ACP) Provider Conversation     Date of ACP Conversation: 8/28/19  Persons included in Conversation:  patient    Authorized Decision Maker (if patient is incapable of making informed decisions):    This person is:   Healthcare Agent/Medical Power of  under Advance Directive - see below. For Patients with Decision Making Capacity:   Values/Goals: Exploration of values, goals, and preferences if recovery is not expected, even with continued medical treatment in the event of:  Imminent death  Severe, permanent brain injury    Conversation Outcomes / Follow-Up Plan:   Recommended completion of Advance Directive form after review of ACP materials and conversation with prospective healthcare agent . I encouraged her to complete an advance directive so that he can be scanned into the EHR. All questions were answered. She wants a family member to be her POA. I have discussed the diagnosis with the patient and the intended plan as seen in the above orders. The patient has received an after-visit summary and questions were answered concerning future plans. I have discussed medication side effects and warnings with the patient as well. I have reviewed the plan of care with the patient, accepted their input and they are in agreement with the treatment goals. Follow-up and Dispositions    · Return in about 6 months (around 2/28/2020) for BP check.

## 2019-09-02 NOTE — ACP (ADVANCE CARE PLANNING)
Advance Care Planning  Advance Care Planning (ACP) Provider Conversation     Date of ACP Conversation: 8/28/19  Persons included in Conversation:  patient    Authorized Decision Maker (if patient is incapable of making informed decisions): This person is:   Healthcare Agent/Medical Power of  under Advance Directive - see below. For Patients with Decision Making Capacity:   Values/Goals: Exploration of values, goals, and preferences if recovery is not expected, even with continued medical treatment in the event of:  Imminent death  Severe, permanent brain injury    Conversation Outcomes / Follow-Up Plan:   Recommended completion of Advance Directive form after review of ACP materials and conversation with prospective healthcare agent . I encouraged her to complete an advance directive so that he can be scanned into the EHR. All questions were answered. She wants a family member to be her POA.     Dr. Power Pelaez  [facility] [address]  Phone: [phone]  Fax: [phone]

## 2019-09-04 LAB
14.3.3 ETA, RHEUM. ARTHRITIS: <0.2 NG/ML
CCP IGA+IGG SERPL IA-ACNC: 22 UNITS
RHEUMATOID FACT SERPL-ACNC: <14 UNITS/ML

## 2019-09-09 ENCOUNTER — TELEPHONE (OUTPATIENT)
Dept: INTERNAL MEDICINE CLINIC | Age: 79
End: 2019-09-09

## 2019-09-09 NOTE — TELEPHONE ENCOUNTER
Pt aware of message below and verbalized understanding. No further questions or concerns from pt at this time. Referral was placed for [facility] will fax results.

## 2019-09-09 NOTE — TELEPHONE ENCOUNTER
----- Message from Azam Heart MD sent at 9/8/2019  7:49 PM EDT -----  Please let her know that 1 of her labs is weakly positive for rheumatoid arthritis. The remainder of her labs are unremarkable.   Please fax these results to her new Rheumatologist.     Dr. Eric Olson  [facility] [address], [address] [place] [address] Str.  Phone: [phone]  Fax: [phone]

## 2019-09-10 ENCOUNTER — HOSPITAL ENCOUNTER (OUTPATIENT)
Dept: GENERAL RADIOLOGY | Age: 79
Discharge: HOME | End: 2019-09-10
Payer: MEDICARE

## 2019-09-10 DIAGNOSIS — M48.9 MASS OF THORACIC VERTEBRA: ICD-10-CM

## 2019-09-10 DIAGNOSIS — M51.9 THORACIC DISC DISEASE: ICD-10-CM

## 2019-09-10 PROCEDURE — 72070 X-RAY EXAM THORAC SPINE 2VWS: CPT

## 2019-09-12 NOTE — PROGRESS NOTES
Please let patient know that her thoracic x-ray does not show any evidence of fracture. She has known scoliosis and arthritis related findings along her thoracic and lumbar spine.     Dr. Compa Kimbrough  [facility] [address], [address] [place] [address] Str.  Phone: [phone]  Fax: [phone]

## 2019-09-13 ENCOUNTER — TELEPHONE (OUTPATIENT)
Dept: INTERNAL MEDICINE CLINIC | Age: 79
End: 2019-09-13

## 2019-09-13 NOTE — TELEPHONE ENCOUNTER
Unable to leave a voicemail for the patient, phone only rang without voicemail. Will try to reach the patient at a later time.

## 2019-09-13 NOTE — TELEPHONE ENCOUNTER
----- Message from Hugo Nowak MD sent at 9/12/2019  7:13 PM EDT -----  Please let patient know that her thoracic x-ray does not show any evidence of fracture. She has known scoliosis and arthritis related findings along her thoracic and lumbar spine.     Dr. Carmen Mckenna  [facility] [address], [address] [place], [place] [address] [address]  Phone: [phone]  Fax: [phone]

## 2019-09-17 NOTE — TELEPHONE ENCOUNTER
Patient contacted, patient identified with two identifiers (Name & ). Patient aware of results per DR. Ervin and verbalizes understanding.

## 2019-09-20 RX ORDER — HYDROCHLOROTHIAZIDE 25 MG/1
25 TABLET ORAL
Qty: 90 TAB | Refills: 1 | Status: SHIPPED | OUTPATIENT
Start: 2019-09-20 | End: 2020-02-28

## 2019-09-20 NOTE — TELEPHONE ENCOUNTER
Last Visit: 2/28/19 with MD Mc Shankar  Next Appointment: 2/28/20 with MD Mc Shankar  Previous Refill Encounter(s): 3/27/19 #90 with 1 refill    Requested Prescriptions     Pending Prescriptions Disp Refills    hydroCHLOROthiazide (HYDRODIURIL) 25 mg tablet 90 Tab 3     Sig: Take 1 Tab by mouth nightly.

## 2019-10-10 ENCOUNTER — CLINICAL SUPPORT (OUTPATIENT)
Dept: INTERNAL MEDICINE CLINIC | Age: 79
End: 2019-10-10

## 2019-10-10 DIAGNOSIS — Z23 ENCOUNTER FOR IMMUNIZATION: Primary | ICD-10-CM

## 2019-10-10 NOTE — PROGRESS NOTES
Lauren Simmons is a 67 y.o. female who presents for routine immunizations. Per verbal order from [address] [place] [address] for influenza, given in left deltoid. She denies any symptoms , reactions or allergies that would exclude them from being immunized today. Risks and adverse reactions were discussed and the VIS was given to them. All questions were addressed. She was observed for 15 min post injection. There were no reactions observed.     Linden Cortes LPN

## 2019-10-10 NOTE — PATIENT INSTRUCTIONS
Vaccine Information Statement    Influenza (Flu) Vaccine (Inactivated or Recombinant): What You Need to Know    Many Vaccine Information Statements are available in Swedish and other languages. See www.immunize.org/vis  Hojas de información sobre vacunas están disponibles en español y en muchos otros idiomas. Visite www.immunize.org/vis    1. Why get vaccinated? Influenza vaccine can prevent influenza (flu). Flu is a contagious disease that spreads around the [facility] every year, usually between October and May. Anyone can get the flu, but it is more dangerous for some people. Infants and young children, people 72years of age and older, pregnant women, and people with certain health conditions or a weakened immune system are at greatest risk of flu complications. Pneumonia, bronchitis, sinus infections and ear infections are examples of flu-related complications. If you have a medical condition, such as heart disease, cancer or diabetes, flu can make it worse. Flu can cause fever and chills, sore throat, muscle aches, fatigue, cough, headache, and runny or stuffy nose. Some people may have vomiting and diarrhea, though this is more common in children than adults. Each year thousands of people in the [facility] die from flu, and many more are hospitalized. Flu vaccine prevents millions of illnesses and flu-related visits to the doctor each year. 2. Influenza vaccines     CDC recommends everyone 10months of age and older get vaccinated every flu season. Children 6 months through 6years of age may need 2 doses during a single flu season. Everyone else needs only 1 dose each flu season. It takes about 2 weeks for protection to develop after vaccination. There are many flu viruses, and they are always changing. Each year a new flu vaccine is made to protect against three or four viruses that are likely to cause disease in the upcoming flu season.  Even when the vaccine doesnt exactly match these viruses, it may still provide some protection. Influenza vaccine does not cause flu. Influenza vaccine may be given at the same time as other vaccines. 3. Talk with your health care provider    Tell your vaccine provider if the person getting the vaccine:   Has had an allergic reaction after a previous dose of influenza vaccine, or has any severe, life-threatening allergies.  Has ever had Guillain-Barré Syndrome (also called GBS). In some cases, your health care provider may decide to postpone influenza vaccination to a future visit. People with minor illnesses, such as a cold, may be vaccinated. People who are moderately or severely ill should usually wait until they recover before getting influenza vaccine. Your health care provider can give you more information. 4. Risks of a reaction     Soreness, redness, and swelling where shot is given, fever, muscle aches, and headache can happen after influenza vaccine.  There may be a very small increased risk of Guillain-Barré Syndrome (GBS) after inactivated influenza vaccine (the flu shot). Ettarajeev [place] children who get the flu shot along with pneumococcal vaccine (PCV13), and/or DTaP vaccine at the same time might be slightly more likely to have a seizure caused by fever. Tell your health care provider if a child who is getting flu vaccine has ever had a seizure. People sometimes faint after medical procedures, including vaccination. Tell your provider if you feel dizzy or have vision changes or ringing in the ears. As with any medicine, there is a very remote chance of a vaccine causing a severe allergic reaction, other serious injury, or death. 5. What if there is a serious problem? An allergic reaction could occur after the vaccinated person leaves the clinic.  If you see signs of a severe allergic reaction (hives, swelling of the face and throat, difficulty breathing, a fast heartbeat, dizziness, or weakness), call 9-1-1 and get the person to the nearest hospital.    For other signs that concern you, call your health care provider. Adverse reactions should be reported to the Vaccine Adverse Event Reporting System (VAERS). Your health care provider will usually file this report, or you can do it yourself. Visit the VAERS website at www.vaers. [facility].gov or call 1-540.103.6365. VAERS is only for reporting reactions, and VAERS staff do not give medical advice. 6. The National Vaccine Injury Compensation Program    The [facility] Vaccine Injury Compensation Program (VICP) is a federal program that was created to compensate people who may have been injured by certain vaccines. Visit the VICP website at www.hrsa.gov/vaccinecompensation or call 1-121.166.1247 to learn about the program and about filing a claim. There is a time limit to file a claim for compensation. 7. How can I learn more?  Ask your health care provider.  Call your local or state health department.  Contact the Centers for Disease Control and Prevention (CDC):  - Call 0-841.897.2706 (9-617-HBF-INFO) or  - Visit CDCs influenza website at www.cdc.gov/flu    Vaccine Information Statement (Interim)  Inactivated Influenza Vaccine   8/15/2019  [address] PAULINE Resendiz Jennifer [id number]   Department of Health and Human Services  Centers for Disease Control and Prevention    Office Use Only

## 2019-11-13 DIAGNOSIS — M19.90 OSTEOARTHRITIS, UNSPECIFIED OSTEOARTHRITIS TYPE, UNSPECIFIED SITE: ICD-10-CM

## 2019-11-13 DIAGNOSIS — R20.2 NUMBNESS AND TINGLING: ICD-10-CM

## 2019-11-13 DIAGNOSIS — R20.0 NUMBNESS AND TINGLING: ICD-10-CM

## 2019-11-13 RX ORDER — CYCLOBENZAPRINE HCL 10 MG
TABLET ORAL
Qty: 90 TAB | Refills: 0 | Status: SHIPPED | OUTPATIENT
Start: 2019-11-13 | End: 2020-03-06

## 2020-01-28 DIAGNOSIS — I10 ESSENTIAL HYPERTENSION: ICD-10-CM

## 2020-01-28 DIAGNOSIS — E78.00 HYPERCHOLESTEROLEMIA: ICD-10-CM

## 2020-01-28 DIAGNOSIS — R73.02 IMPAIRED GLUCOSE TOLERANCE: ICD-10-CM

## 2020-01-28 DIAGNOSIS — K75.81 NASH (NONALCOHOLIC STEATOHEPATITIS): Primary | ICD-10-CM

## 2020-01-28 DIAGNOSIS — R79.82 ELEVATED C-REACTIVE PROTEIN (CRP): ICD-10-CM

## 2020-01-28 DIAGNOSIS — Z13.0 SCREENING FOR DEFICIENCY ANEMIA: ICD-10-CM

## 2020-01-28 DIAGNOSIS — R73.9 HYPERGLYCEMIA: ICD-10-CM

## 2020-02-17 ENCOUNTER — APPOINTMENT (OUTPATIENT)
Dept: INTERNAL MEDICINE CLINIC | Age: 80
End: 2020-02-17

## 2020-02-17 ENCOUNTER — HOSPITAL ENCOUNTER (OUTPATIENT)
Dept: LAB | Age: 80
Discharge: HOME | End: 2020-02-17
Payer: MEDICARE

## 2020-02-17 DIAGNOSIS — R73.02 IMPAIRED GLUCOSE TOLERANCE: ICD-10-CM

## 2020-02-17 DIAGNOSIS — I10 ESSENTIAL HYPERTENSION: ICD-10-CM

## 2020-02-17 DIAGNOSIS — K75.81 NASH (NONALCOHOLIC STEATOHEPATITIS): ICD-10-CM

## 2020-02-17 DIAGNOSIS — E78.00 HYPERCHOLESTEROLEMIA: ICD-10-CM

## 2020-02-17 DIAGNOSIS — Z13.0 SCREENING FOR DEFICIENCY ANEMIA: ICD-10-CM

## 2020-02-17 DIAGNOSIS — R79.82 ELEVATED C-REACTIVE PROTEIN (CRP): ICD-10-CM

## 2020-02-17 DIAGNOSIS — R73.9 HYPERGLYCEMIA: ICD-10-CM

## 2020-02-17 LAB
ALBUMIN SERPL-MCNC: 3.6 G/DL (ref 3.4–5)
ALBUMIN/GLOB SERPL: 1.2 {RATIO} (ref 0.8–1.7)
ALP SERPL-CCNC: 57 U/L (ref 45–117)
ALT SERPL-CCNC: 35 U/L (ref 13–56)
ANION GAP SERPL CALC-SCNC: 7 MMOL/L (ref 3–18)
AST SERPL-CCNC: 22 U/L (ref 10–38)
BASOPHILS # BLD: 0 K/UL (ref 0–0.1)
BASOPHILS NFR BLD: 0 % (ref 0–2)
BILIRUB SERPL-MCNC: 0.3 MG/DL (ref 0.2–1)
BUN SERPL-MCNC: 12 MG/DL (ref 7–18)
BUN/CREAT SERPL: 18 (ref 12–20)
CALCIUM SERPL-MCNC: 8.8 MG/DL (ref 8.5–10.1)
CHLORIDE SERPL-SCNC: 97 MMOL/L (ref 100–111)
CHOLEST SERPL-MCNC: 134 MG/DL
CO2 SERPL-SCNC: 29 MMOL/L (ref 21–32)
CREAT SERPL-MCNC: 0.67 MG/DL (ref 0.6–1.3)
CREAT UR-MCNC: 71 MG/DL (ref 30–125)
CRP SERPL-MCNC: <0.3 MG/DL (ref 0–0.3)
DIFFERENTIAL METHOD BLD: ABNORMAL
EOSINOPHIL # BLD: 0.2 K/UL (ref 0–0.4)
EOSINOPHIL NFR BLD: 3 % (ref 0–5)
ERYTHROCYTE [DISTWIDTH] IN BLOOD BY AUTOMATED COUNT: 13.6 % (ref 11.6–14.5)
GLOBULIN SER CALC-MCNC: 2.9 G/DL (ref 2–4)
GLUCOSE SERPL-MCNC: 92 MG/DL (ref 74–99)
HBA1C MFR BLD: 5.7 % (ref 4.2–5.6)
HCT VFR BLD AUTO: 38.4 % (ref 35–45)
HDLC SERPL-MCNC: 58 MG/DL (ref 40–60)
HDLC SERPL: 2.3 {RATIO} (ref 0–5)
HGB BLD-MCNC: 13 G/DL (ref 12–16)
LDLC SERPL CALC-MCNC: 57.8 MG/DL (ref 0–100)
LIPID PROFILE,FLP: NORMAL
LYMPHOCYTES # BLD: 1.4 K/UL (ref 0.9–3.6)
LYMPHOCYTES NFR BLD: 27 % (ref 21–52)
MCH RBC QN AUTO: 31 PG (ref 24–34)
MCHC RBC AUTO-ENTMCNC: 33.9 G/DL (ref 31–37)
MCV RBC AUTO: 91.6 FL (ref 74–97)
MICROALBUMIN UR-MCNC: 0.84 MG/DL (ref 0–3)
MICROALBUMIN/CREAT UR-RTO: 12 MG/G (ref 0–30)
MONOCYTES # BLD: 0.5 K/UL (ref 0.05–1.2)
MONOCYTES NFR BLD: 9 % (ref 3–10)
NEUTS SEG # BLD: 3.2 K/UL (ref 1.8–8)
NEUTS SEG NFR BLD: 61 % (ref 40–73)
PLATELET # BLD AUTO: 248 K/UL (ref 135–420)
PMV BLD AUTO: 10 FL (ref 9.2–11.8)
POTASSIUM SERPL-SCNC: 3.5 MMOL/L (ref 3.5–5.5)
PROT SERPL-MCNC: 6.5 G/DL (ref 6.4–8.2)
RBC # BLD AUTO: 4.19 M/UL (ref 4.2–5.3)
SODIUM SERPL-SCNC: 133 MMOL/L (ref 136–145)
TRIGL SERPL-MCNC: 91 MG/DL (ref ?–150)
VLDLC SERPL CALC-MCNC: 18.2 MG/DL
WBC # BLD AUTO: 5.2 K/UL (ref 4.6–13.2)

## 2020-02-17 PROCEDURE — 80053 COMPREHEN METABOLIC PANEL: CPT

## 2020-02-17 PROCEDURE — 36415 COLL VENOUS BLD VENIPUNCTURE: CPT

## 2020-02-17 PROCEDURE — 83036 HEMOGLOBIN GLYCOSYLATED A1C: CPT

## 2020-02-17 PROCEDURE — 82043 UR ALBUMIN QUANTITATIVE: CPT

## 2020-02-17 PROCEDURE — 80061 LIPID PANEL: CPT

## 2020-02-17 PROCEDURE — 86140 C-REACTIVE PROTEIN: CPT

## 2020-02-17 PROCEDURE — 83520 IMMUNOASSAY QUANT NOS NONAB: CPT

## 2020-02-17 PROCEDURE — 86225 DNA ANTIBODY NATIVE: CPT

## 2020-02-17 PROCEDURE — 85025 COMPLETE CBC W/AUTO DIFF WBC: CPT

## 2020-02-18 NOTE — PROGRESS NOTES
I will discuss these results further at her upcoming appointment. Her A1c is down from 6 to 5.7. Kidney and liver function labs are unremarkable. Her total cholesterol is 134. Triglycerides are 91. HDL is 58. LDL is 57.8. CRP is undetectable. Her CBC is unremarkable.     Dr. Manasa Winn  [facility], [address] [place] [address], [address] [place] [address] Str.  Phone: [phone]  Fax: [phone]

## 2020-02-19 LAB
CENTROMERE B AB SER-ACNC: <0.2 AI (ref 0–0.9)
CHROMATIN AB SERPL-ACNC: <0.2 AI (ref 0–0.9)
DSDNA AB SER-ACNC: <1 IU/ML (ref 0–9)
ENA JO1 AB SER-ACNC: <0.2 AI (ref 0–0.9)
ENA RNP AB SER-ACNC: <0.2 AI (ref 0–0.9)
ENA SCL70 AB SER-ACNC: <0.2 AI (ref 0–0.9)
ENA SM AB SER-ACNC: <0.2 AI (ref 0–0.9)
ENA SS-A AB SER-ACNC: <0.2 AI (ref 0–0.9)
ENA SS-B AB SER-ACNC: <0.2 AI (ref 0–0.9)
SEE BELOW, 164869: NORMAL

## 2020-02-26 LAB
14.3.3 ETA, RHEUM. ARTHRITIS: <0.2 NG/ML
CCP IGA+IGG SERPL IA-ACNC: <20 UNITS
RHEUMATOID FACT SERPL-ACNC: <14 UNITS/ML

## 2020-02-28 ENCOUNTER — OFFICE VISIT (OUTPATIENT)
Dept: INTERNAL MEDICINE CLINIC | Age: 80
End: 2020-02-28

## 2020-02-28 VITALS
HEART RATE: 81 BPM | HEIGHT: 63 IN | WEIGHT: 146.8 LBS | OXYGEN SATURATION: 96 % | SYSTOLIC BLOOD PRESSURE: 109 MMHG | TEMPERATURE: 96.3 F | DIASTOLIC BLOOD PRESSURE: 52 MMHG | RESPIRATION RATE: 12 BRPM | BODY MASS INDEX: 26.01 KG/M2

## 2020-02-28 DIAGNOSIS — R73.9 HYPERGLYCEMIA: ICD-10-CM

## 2020-02-28 DIAGNOSIS — G89.4 CHRONIC PAIN SYNDROME: ICD-10-CM

## 2020-02-28 DIAGNOSIS — E78.00 HYPERCHOLESTEROLEMIA: ICD-10-CM

## 2020-02-28 DIAGNOSIS — M48.061 SPINAL STENOSIS OF LUMBAR REGION, UNSPECIFIED WHETHER NEUROGENIC CLAUDICATION PRESENT: ICD-10-CM

## 2020-02-28 DIAGNOSIS — M19.90 ARTHRITIS: ICD-10-CM

## 2020-02-28 DIAGNOSIS — R73.02 IMPAIRED GLUCOSE TOLERANCE: ICD-10-CM

## 2020-02-28 DIAGNOSIS — I10 ESSENTIAL HYPERTENSION: Primary | ICD-10-CM

## 2020-02-28 DIAGNOSIS — K75.81 NASH (NONALCOHOLIC STEATOHEPATITIS): ICD-10-CM

## 2020-02-28 RX ORDER — HYDROCHLOROTHIAZIDE 12.5 MG/1
12.5 TABLET ORAL
Qty: 90 TAB | Refills: 3 | Status: SHIPPED | OUTPATIENT
Start: 2020-02-28

## 2020-02-28 RX ORDER — TRAMADOL HYDROCHLORIDE 50 MG/1
50 TABLET ORAL
COMMUNITY
Start: 2020-01-29

## 2020-02-28 NOTE — PROGRESS NOTES
[facility] OF [facility]:  2/28/2020, MRN: [id number]      Sherry Hernandez is a 67 y.o. female and presents to clinic for Follow-up (patient has no issues or problems today       ROOM   12) and Labs (done 02-)    Subjective: The patient is a 64-year-old female with history of carotid artery stenosis,  LEVY, gastritis (10/17/15), diverticulosis, prediabetes, thoracic disc disease, lumbar disc disease, osteoarthritis per EHR (followed by ), right rotator cuff tendinopathy, lumbar spinal stenosis, HLD, and OAB per EHR. 1. Prediabetes: Her most recent labs show that her A1c is down from 6 to 5.7. Diet: Nutrisystem diet. Weight is 846 pounds. 2. HTN/HLD/LEVY: Bp is 109/52. She is taking HCTZ 25 mg daily and Lasix. Dizzy spells: none. +H/o multiple falls since her last appointment. See #3. Taking simvastatin. No excessive ETOH intake. 3. Chronic Pain: Taking gabapentin 300 mg twice daily. Drug use: none. Tobacco use: none. ETOH: maybe a glass of wine every 6 months. +NSAIDs. +H/o thoracic disc disease, lumbar disc disease, and lumbar spinal stenosis. She had an MRI of her lumbar spine this month. Findings are listed below. She had a right hip injection and had some pain relief with this injection. A picture of her hip was obtained per pt hx, and was unremarkable. Her right hip is giving out since her last apt. Sx occur after lying down. She has had 3-4 very traumatic falls since her last apt. No fractures. She is scheduled to f/u with Orthopedics. She was referred to Rheumatology after 1 of her labs was positive for 8-week rheumatoid arthritis serology test.  Her Rheumatologist referred her to Pain Management. She was sent home with tramadol from Rheumatology. This rx helps to relieve her sx. She takes sparingly. Pain is along her lower back and does not radiate. 2/27/20 Lumbar MRI: Little or no significant change, showing: S-shaped thoracolumbar scoliosis.  Multilevel disc disease without significant canal or lateral recess stenosis. No high-grade foraminal stenosis. No foraminal root distortion. Severe facet arthropathy on the right at L4-5 and bilaterally at L5-S1.    4. Headache: She had a HA earlier this month which caused her to go to the ED. A CT scan was ordered. Findings are listed below. No HAs since then. When she had the HA earlier this month, she had sharp occipital based pain. In the past, she was diagnosed with an occipital HA. She is now followed by [facility]. Head CT 2/1/20:  No acute intracranial abnormality        Patient Active Problem List    Diagnosis Date Noted    Anxiety 09/02/2019    Impaired glucose tolerance 08/28/2019    Diverticulosis 08/28/2019    LEVY (nonalcoholic steatohepatitis) 08/28/2019    Carotid artery stenosis 08/28/2019    Gastritis 08/28/2019    Lumbar disc disease 08/28/2019    Lumbar spinal stenosis 08/28/2019    Tendinopathy of right rotator cuff 08/28/2019    Thoracic disc disease 08/28/2019    History of recurrent UTIs 05/16/2018    Overactive bladder 05/16/2018    Hypercholesterolemia     Arthritis        Current Outpatient Medications   Medication Sig Dispense Refill    traMADol (ULTRAM) 50 mg tablet Take 50 mg by mouth two (2) times daily as needed.  glucosam-MSM-chond-hyaluron ac (GLUCOSAMINE-CHONDROITIN) 256--1 mg tab Take  by mouth daily.  cyclobenzaprine (FLEXERIL) 10 mg tablet TAKE 1 TABLET BY MOUTH EVERY 8 HOURS AS NEEDED FOR MUSCLE SPASM(S) 90 Tab 0    hydroCHLOROthiazide (HYDRODIURIL) 25 mg tablet Take 1 Tab by mouth nightly. 90 Tab 1    melatonin 3 mg tablet Take 3 mg by mouth nightly.  gabapentin (NEURONTIN) 300 mg capsule Take 1 Cap by mouth two (2) times a day. Max Daily Amount: 600 mg. (Patient taking differently: Take 300 mg by mouth daily.) 60 Cap 5    nystatin-triamcinolone (MYCOLOG) 100,000-0.1 unit/gram-% ointment Apply  to affected area two (2) times a day.  30 g 5    simvastatin (ZOCOR) 20 mg tablet take 1 tablet by mouth at bedtime 90 Tab 3    VITAMIN D2 50,000 unit capsule TAKE 1 CAPSULE BY MOUTH EVERY THURSDAY. 12 Cap 3    busPIRone (BUSPAR) 10 mg tablet Take 1 Tab by mouth three (3) times daily. 90 Tab 3    desonide (TRIDESILON) 0.05 % topical lotion Apply  to affected area two (2) times a day.  clobetasol (TEMOVATE) 0.05 % external solution Apply  to affected area two (2) times daily as needed. 0    celecoxib (CELEBREX) 200 mg capsule Take 200 mg by mouth two (2) times a day.  0    raloxifene (EVISTA) 60 mg tablet Take 60 mg by mouth daily.  venlafaxine-SR (EFFEXOR-XR) 75 mg capsule Take 75 mg by mouth daily. 0    furosemide (LASIX) 20 mg tablet Take by mouth daily as needed for severe swelling in legs only 30 Tab 0    diclofenac (VOLTAREN) 1 % gel Apply  to affected area four (4) times daily. 100 g 1    psyllium seed, with sugar, (FIBER SUPPLEMENT PO) Take  by mouth daily.  tacrolimus (PROTOPIC) 0.1 % ointment Apply  to affected area two (2) times daily as needed.  ketoconazole (NIZORAL) 2 % shampoo Apply  to affected area as needed. 0       Allergies   Allergen Reactions    Hydrocodone Bitartrate Nausea Only    Ibuprofen Other (comments)     Platelets drop     Iodinated Contrast Media Other (comments) and Swelling    Iodine Swelling    Nsaids (Non-Steroidal Anti-Inflammatory Drug) Other (comments)     DECREASED PLATELETS    Other Medication Other (comments) and Swelling       Past Medical History:   Diagnosis Date    Arthritis     Breast tumor     Chronic pain     Hypercholesterolemia        No past surgical history on file.     Family History   Problem Relation Age of Onset    Hypertension Mother     Diabetes Mother     Stroke Father     Heart Disease Brother        Social History     Tobacco Use    Smoking status: Former Smoker     Types: Cigarettes    Smokeless tobacco: Never Used    Tobacco comment: in college   Substance Use Topics  Alcohol use: Yes     Alcohol/week: 1.0 standard drinks     Types: 1 Glasses of wine per week     Comment: socially       ROS   Review of Systems   Constitutional: Negative for chills and fever. HENT: Negative for ear pain and sore throat. Eyes: Negative for blurred vision and pain. Respiratory: Negative for cough and shortness of breath. Cardiovascular: Negative for chest pain. Gastrointestinal: Negative for abdominal pain, blood in stool and melena. Genitourinary: Negative for dysuria and hematuria. Musculoskeletal: Positive for back pain, falls and joint pain. Negative for myalgias. Skin: Negative for rash. Neurological: Positive for focal weakness. Negative for headaches. Endo/Heme/Allergies: Does not bruise/bleed easily. Psychiatric/Behavioral: Negative for substance abuse. Objective     Vitals:    02/28/20 0911   BP: 109/52   Pulse: 81   Resp: 12   Temp: 96.3 °F (35.7 °C)   TempSrc: Oral   SpO2: 96%   Weight: 146 lb 12.8 oz (66.6 kg)   Height: 5' 3\" (1.6 m)   PainSc:   4   PainLoc: Back       Physical Exam  Vitals signs and nursing note reviewed. HENT:      Head: Normocephalic and atraumatic. Right Ear: External ear normal.      Left Ear: External ear normal.      Nose: Nose normal.      Mouth/Throat:      Pharynx: No oropharyngeal exudate. Eyes:      General: No scleral icterus. Right eye: No discharge. Left eye: No discharge. Conjunctiva/sclera: Conjunctivae normal.   Neck:      Musculoskeletal: Neck supple. Cardiovascular:      Rate and Rhythm: Normal rate and regular rhythm. Heart sounds: Normal heart sounds. No murmur. No friction rub. No gallop. Pulmonary:      Effort: Pulmonary effort is normal. No respiratory distress. Breath sounds: Normal breath sounds. No wheezing or rales. Abdominal:      General: Bowel sounds are normal. There is no distension. Palpations: Abdomen is soft. There is no mass. Tenderness:  There is no abdominal tenderness. There is no guarding or rebound. Musculoskeletal:         General: No swelling (Bue) or tenderness (BUE). Comments: Her thoracic and lumbar spinous processes are tender to palpation. The remainder of her back exam is unremarkable. Lymphadenopathy:      Cervical: No cervical adenopathy. Skin:     General: Skin is warm and dry. Findings: No erythema. Neurological:      Mental Status: She is alert and oriented to person, place, and time. Motor: No abnormal muscle tone. Gait: Gait is intact. Gait normal.   Psychiatric:         Mood and Affect: Mood and affect normal.         LABS   Data Review:   Lab Results   Component Value Date/Time    WBC 5.2 02/17/2020 09:50 AM    HGB 13.0 02/17/2020 09:50 AM    HCT 38.4 02/17/2020 09:50 AM    PLATELET 744 62/61/9246 09:50 AM    MCV 91.6 02/17/2020 09:50 AM       Lab Results   Component Value Date/Time    Sodium 133 (L) 02/17/2020 09:50 AM    Potassium 3.5 02/17/2020 09:50 AM    Chloride 97 (L) 02/17/2020 09:50 AM    CO2 29 02/17/2020 09:50 AM    Anion gap 7 02/17/2020 09:50 AM    Glucose 92 02/17/2020 09:50 AM    BUN 12 02/17/2020 09:50 AM    Creatinine 0.67 02/17/2020 09:50 AM    BUN/Creatinine ratio 18 02/17/2020 09:50 AM    GFR est AA >60 02/17/2020 09:50 AM    GFR est non-AA >60 02/17/2020 09:50 AM    Calcium 8.8 02/17/2020 09:50 AM       Lab Results   Component Value Date/Time    Cholesterol, total 134 02/17/2020 09:50 AM    HDL Cholesterol 58 02/17/2020 09:50 AM    LDL, calculated 57.8 02/17/2020 09:50 AM    VLDL, calculated 18.2 02/17/2020 09:50 AM    Triglyceride 91 02/17/2020 09:50 AM    CHOL/HDL Ratio 2.3 02/17/2020 09:50 AM       Lab Results   Component Value Date/Time    Hemoglobin A1c 5.7 (H) 02/17/2020 09:50 AM       Assessment/Plan:   1. Impaired glucose tolerance: +Losing weight. A1c is improving.  -I encouraged her to continue her weight loss journey. Okay to continue with Nutrisystem.   I will recheck her weight at her follow-up appointment.  -We will check a CMP, A1c, and lipid panel just before her follow-up appointment per her request.    2. Hypercholesterolemia/LEVY and HTN: BP is low with recent weight loss.  -I instructed the patient to take 12.5 mg of HCTZ to 25 mg daily. She can continue taking Lasix as prescribed.  -I encouraged her to check her blood pressure at home and notify us if her pressure readings are persistently greater than 140/90.  -We will check a urine protein screen, CMP, lipid panel, and an A1c just before her follow-up appointment per her request.    3. HAs: Resolved. -Continue to follow-up with Neurology. Observation. 4. Chronic Pain:  -Okay to continue seeing all specialists. -Okay continue with use of tramadol as needed and gabapentin. - Activity as  tolerated. - Fall risk reduction measures were discussed with the patient.  -I will check a CRP just before her follow-up appointment. There are no preventive care reminders to display for this patient. Lab review: labs are reviewed in the EHR and ordered as mentioned above    I have discussed the diagnosis with the patient and the intended plan as seen in the above orders. The patient has received an after-visit summary and questions were answered concerning future plans. I have discussed medication side effects and warnings with the patient as well. I have reviewed the plan of care with the patient, accepted their input and they are in agreement with the treatment goals. All questions were answered. The patient understands the plan of care. Handouts provided today with above information. Pt instructed if symptoms worsen to call the office or report to the ED for continued care. Greater than 50% of the visit time was spent in counseling and/or coordination of care. Voice recognition was used to generate this report, which may have resulted in some phonetic based errors in grammar and contents.  Even though attempts were made to correct all the mistakes, some may have been missed, and remained in the body of the document.           Raf Morton MD

## 2020-02-28 NOTE — PROGRESS NOTES
Steve Almonte presents today for   Chief Complaint   Patient presents with    Follow-up     patient has no issues or problems today       ROOM   12    Labs     done 02-       Is someone accompanying this pt? no    Is the patient using any DME equipment during OV? no    Depression Screening:  3 most recent PHQ Screens 2/28/2020   Little interest or pleasure in doing things Not at all   Feeling down, depressed, irritable, or hopeless Not at all   Total Score PHQ 2 0       Learning Assessment:  Learning Assessment 2/28/2020   PRIMARY LEARNER Patient   HIGHEST LEVEL OF EDUCATION - PRIMARY LEARNER  2 YEARS [place] PRIMARY LEARNER NONE   CO-LEARNER CAREGIVER No   PRIMARY LANGUAGE ENGLISH   LEARNER PREFERENCE PRIMARY DEMONSTRATION   ANSWERED BY patient   RELATIONSHIP SELF       Abuse Screening:  Abuse Screening Questionnaire 2/28/2020   Do you ever feel afraid of your partner? N   Are you in a relationship with someone who physically or mentally threatens you? N   Is it safe for you to go home? Y       Fall Risk  Fall Risk Assessment, last 12 mths 2/28/2020   Able to walk? Yes   Fall in past 12 months? Yes   Fall with injury? Yes   Number of falls in past 12 months 4   Fall Risk Score 5       Health Maintenance reviewed and discussed and ordered per Provider. There are no preventive care reminders to display for this patient. .      Coordination of Care:  1. Have you been to the ER, urgent care clinic since your last visit? Hospitalized since your last visit? Yes When:  02- Where: [facility]   Reason:  Fall, and had a Post Traumatic Headache. 2. Have you seen or consulted any other health care providers outside of the [address] [place] [address] [facility] since your last visit? Include any pap smears or colon screening.  no

## 2020-02-28 NOTE — PATIENT INSTRUCTIONS
High Blood Pressure: Care Instructions  Overview    It's normal for blood pressure to go up and down throughout the day. But if it stays up, you have high blood pressure. Another name for high blood pressure is hypertension. Despite what a lot of people think, high blood pressure usually doesn't cause headaches or make you feel dizzy or lightheaded. It usually has no symptoms. But it does increase your risk of stroke, heart attack, and other problems. You and your doctor will talk about your risks of these problems based on your blood pressure. Your doctor will give you a goal for your blood pressure. Your goal will be based on your health and your age. Lifestyle changes, such as eating healthy and being active, are always important to help lower blood pressure. You might also take medicine to reach your blood pressure goal.  Follow-up care is a key part of your treatment and safety. Be sure to make and go to all appointments, and call your doctor if you are having problems. It's also a good idea to know your test results and keep a list of the medicines you take. How can you care for yourself at home? Medical treatment  · If you stop taking your medicine, your blood pressure will go back up. You may take one or more types of medicine to lower your blood pressure. Be safe with medicines. Take your medicine exactly as prescribed. Call your doctor if you think you are having a problem with your medicine. · Talk to your doctor before you start taking aspirin every day. Aspirin can help certain people lower their risk of a heart attack or stroke. But taking aspirin isn't right for everyone, because it can cause serious bleeding. · See your doctor regularly. You may need to see the doctor more often at first or until your blood pressure comes down. · If you are taking blood pressure medicine, talk to your doctor before you take decongestants or anti-inflammatory medicine, such as ibuprofen.  Some of these medicines can raise blood pressure. · Learn how to check your blood pressure at home. Lifestyle changes  · Stay at a healthy weight. This is especially important if you put on weight around the waist. Losing even 10 pounds can help you lower your blood pressure. · If your doctor recommends it, get more exercise. Walking is a good choice. Bit by bit, increase the amount you walk every day. Try for at least 30 minutes on most days of the week. You also may want to swim, bike, or do other activities. · Avoid or limit alcohol. Talk to your doctor about whether you can drink any alcohol. · Try to limit how much sodium you eat to less than 2,300 milligrams (mg) a day. Your doctor may ask you to try to eat less than 1,500 mg a day. · Eat plenty of fruits (such as bananas and oranges), vegetables, legumes, whole grains, and low-fat dairy products. · Lower the amount of saturated fat in your diet. Saturated fat is found in animal products such as milk, cheese, and meat. Limiting these foods may help you lose weight and also lower your risk for heart disease. · Do not smoke. Smoking increases your risk for heart attack and stroke. If you need help quitting, talk to your doctor about stop-smoking programs and medicines. These can increase your chances of quitting for good. When should you call for help? Call  911 anytime you think you may need emergency care. This may mean having symptoms that suggest that your blood pressure is causing a serious heart or blood vessel problem. Your blood pressure may be over 180/120.   For example, call  911 if:    · You have symptoms of a heart attack. These may include:  ? Chest pain or pressure, or a strange feeling in the chest.  ? Sweating. ? Shortness of breath. ? Nausea or vomiting. ? Pain, pressure, or a strange feeling in the back, neck, jaw, or upper belly or in one or both shoulders or arms. ? Lightheadedness or sudden weakness.   ? A fast or irregular heartbeat.     · You have symptoms of a stroke. These may include:  ? Sudden numbness, tingling, weakness, or loss of movement in your face, arm, or leg, especially on only one side of your body. ? Sudden vision changes. ? Sudden trouble speaking. ? Sudden confusion or trouble understanding simple statements. ? Sudden problems with walking or balance. ? A sudden, severe headache that is different from past headaches.     · You have severe back or belly pain.    Do not wait until your blood pressure comes down on its own. Get help right away.   Call your doctor now or seek immediate care if:    · Your blood pressure is much higher than normal (such as 180/120 or higher), but you don't have symptoms.     · You think high blood pressure is causing symptoms, such as:  ? Severe headache.  ? Blurry vision.    Watch closely for changes in your health, and be sure to contact your doctor if:    · Your blood pressure measures higher than your doctor recommends at least 2 times. That means the top number is higher or the bottom number is higher, or both.     · You think you may be having side effects from your blood pressure medicine. Where can you learn more? Go to http://robbin-duglas.info/. Enter X852 in the search box to learn more about \"High Blood Pressure: Care Instructions. \"  Current as of: April 9, 2019  Content Version: 12.2  © 8555-3531 [employer]. Care instructions adapted under license by [employer] (which disclaims liability or warranty for this information). If you have questions about a medical condition or this instruction, always ask your healthcare professional. [place] [address] any warranty or liability for your use of this information.

## 2020-03-31 RX ORDER — ERGOCALCIFEROL 1.25 MG/1
CAPSULE ORAL
Qty: 12 CAP | Refills: 3 | OUTPATIENT
Start: 2020-03-31

## 2020-03-31 NOTE — TELEPHONE ENCOUNTER
Last Visit: 2/28/20 with MD Patricio Britt  Next Appointment: 8/28/20 with MD Patricio Britt  Previous Refill Encounter(s): 1/16/19 #12 with 3 refills    Requested Prescriptions     Pending Prescriptions Disp Refills    ergocalciferol (Vitamin D2) 1,250 mcg (50,000 unit) capsule 12 Cap 3     Sig: Take 1 capsule every Thursday

## 2020-03-31 NOTE — TELEPHONE ENCOUNTER
Please have her take OTC vitamin D. We will recheck a vitamin D level later this year.     Dr. Abner Mcconnell  [facility], [facility], [address] [place] [address] Str.  Phone: [phone]  Fax: [phone]

## 2020-07-03 DIAGNOSIS — R20.2 NUMBNESS AND TINGLING: ICD-10-CM

## 2020-07-03 DIAGNOSIS — M19.90 OSTEOARTHRITIS, UNSPECIFIED OSTEOARTHRITIS TYPE, UNSPECIFIED SITE: ICD-10-CM

## 2020-07-03 DIAGNOSIS — R20.0 NUMBNESS AND TINGLING: ICD-10-CM

## 2020-07-06 RX ORDER — CYCLOBENZAPRINE HCL 10 MG
TABLET ORAL
Qty: 30 TAB | Refills: 3 | Status: SHIPPED | OUTPATIENT
Start: 2020-07-06

## 2020-07-21 RX ORDER — SIMVASTATIN 20 MG/1
20 TABLET, FILM COATED ORAL
Qty: 90 TAB | Refills: 3 | Status: SHIPPED | OUTPATIENT
Start: 2020-07-21

## 2020-08-17 ENCOUNTER — APPOINTMENT (OUTPATIENT)
Dept: INTERNAL MEDICINE CLINIC | Age: 80
End: 2020-08-17

## 2020-08-18 LAB
ALBUMIN SERPL-MCNC: 4.2 G/DL (ref 3.7–4.7)
ALBUMIN/CREAT UR: 7 MG/G CREAT (ref 0–29)
ALBUMIN/GLOB SERPL: 1.8 {RATIO} (ref 1.2–2.2)
ALP SERPL-CCNC: 50 IU/L (ref 39–117)
ALT SERPL-CCNC: 17 IU/L (ref 0–32)
AST SERPL-CCNC: 16 IU/L (ref 0–40)
BASOPHILS # BLD AUTO: 0 X10E3/UL (ref 0–0.2)
BASOPHILS NFR BLD AUTO: 1 %
BILIRUB SERPL-MCNC: 0.2 MG/DL (ref 0–1.2)
BUN SERPL-MCNC: 13 MG/DL (ref 8–27)
BUN/CREAT SERPL: 17 (ref 12–28)
CALCIUM SERPL-MCNC: 9.5 MG/DL (ref 8.7–10.3)
CHLORIDE SERPL-SCNC: 95 MMOL/L (ref 96–106)
CHOLEST SERPL-MCNC: 132 MG/DL (ref 100–199)
CO2 SERPL-SCNC: 23 MMOL/L (ref 20–29)
CREAT SERPL-MCNC: 0.75 MG/DL (ref 0.57–1)
CREAT UR-MCNC: 56.3 MG/DL
CRP SERPL-MCNC: 1 MG/L (ref 0–10)
EOSINOPHIL # BLD AUTO: 0.1 X10E3/UL (ref 0–0.4)
EOSINOPHIL NFR BLD AUTO: 3 %
ERYTHROCYTE [DISTWIDTH] IN BLOOD BY AUTOMATED COUNT: 12.9 % (ref 11.7–15.4)
GLOBULIN SER CALC-MCNC: 2.3 G/DL (ref 1.5–4.5)
GLUCOSE SERPL-MCNC: 98 MG/DL (ref 65–99)
HBA1C MFR BLD: 5.8 % (ref 4.8–5.6)
HCT VFR BLD AUTO: 41.8 % (ref 34–46.6)
HDLC SERPL-MCNC: 49 MG/DL
HGB BLD-MCNC: 13.7 G/DL (ref 11.1–15.9)
IMM GRANULOCYTES # BLD AUTO: 0 X10E3/UL (ref 0–0.1)
IMM GRANULOCYTES NFR BLD AUTO: 0 %
INTERPRETATION, 910389: NORMAL
LDLC SERPL CALC-MCNC: 58 MG/DL (ref 0–99)
LYMPHOCYTES # BLD AUTO: 1.4 X10E3/UL (ref 0.7–3.1)
LYMPHOCYTES NFR BLD AUTO: 26 %
MCH RBC QN AUTO: 30.6 PG (ref 26.6–33)
MCHC RBC AUTO-ENTMCNC: 32.8 G/DL (ref 31.5–35.7)
MCV RBC AUTO: 94 FL (ref 79–97)
MICROALBUMIN UR-MCNC: 4 UG/ML
MONOCYTES # BLD AUTO: 0.5 X10E3/UL (ref 0.1–0.9)
MONOCYTES NFR BLD AUTO: 9 %
NEUTROPHILS # BLD AUTO: 3.3 X10E3/UL (ref 1.4–7)
NEUTROPHILS NFR BLD AUTO: 61 %
PLATELET # BLD AUTO: 238 X10E3/UL (ref 150–450)
POTASSIUM SERPL-SCNC: 4.3 MMOL/L (ref 3.5–5.2)
PROT SERPL-MCNC: 6.5 G/DL (ref 6–8.5)
RBC # BLD AUTO: 4.47 X10E6/UL (ref 3.77–5.28)
SODIUM SERPL-SCNC: 134 MMOL/L (ref 134–144)
TRIGL SERPL-MCNC: 125 MG/DL (ref 0–149)
VLDLC SERPL CALC-MCNC: 25 MG/DL (ref 5–40)
WBC # BLD AUTO: 5.4 X10E3/UL (ref 3.4–10.8)

## 2020-08-18 NOTE — PROGRESS NOTES
I will discuss her results with her at her upcoming apt. Her CBC and CRP are normal. Her CMP shows no significant abnormalities. Her total cholesterol is 132. Triglycerides are 125. Her HDL is 49. Her LDL is 58. No microalbuminuria. A1C is 5.8. Her A1C was 5.7 six months ago.     Dr. Carroll Springer  [facility], [address] [place] [address], [address] [place] [address] Str.  Phone: [phone]  Fax: [phone]

## 2020-08-28 ENCOUNTER — VIRTUAL VISIT (OUTPATIENT)
Dept: INTERNAL MEDICINE CLINIC | Age: 80
End: 2020-08-28

## 2020-08-28 ENCOUNTER — TELEPHONE (OUTPATIENT)
Dept: INTERNAL MEDICINE CLINIC | Age: 80
End: 2020-08-28

## 2020-08-28 DIAGNOSIS — F41.9 ANXIETY: ICD-10-CM

## 2020-08-28 DIAGNOSIS — Z00.00 MEDICARE ANNUAL WELLNESS VISIT, SUBSEQUENT: ICD-10-CM

## 2020-08-28 DIAGNOSIS — Z71.89 ADVANCE CARE PLANNING: ICD-10-CM

## 2020-08-28 DIAGNOSIS — M51.9 LUMBAR DISC DISEASE: ICD-10-CM

## 2020-08-28 DIAGNOSIS — I10 ESSENTIAL HYPERTENSION: ICD-10-CM

## 2020-08-28 DIAGNOSIS — K75.81 NASH (NONALCOHOLIC STEATOHEPATITIS): ICD-10-CM

## 2020-08-28 DIAGNOSIS — R73.9 HYPERGLYCEMIA: ICD-10-CM

## 2020-08-28 DIAGNOSIS — E55.9 VITAMIN D DEFICIENCY: ICD-10-CM

## 2020-08-28 DIAGNOSIS — N39.0 URINARY TRACT INFECTION WITHOUT HEMATURIA, SITE UNSPECIFIED: Primary | ICD-10-CM

## 2020-08-28 DIAGNOSIS — E78.00 HYPERCHOLESTEROLEMIA: ICD-10-CM

## 2020-08-28 NOTE — PROGRESS NOTES
Airam Mosher is a 79 y.o. female who was seen by synchronous (real-time) audio-video technology on 8/28/2020. Assessment & Plan:   1. Lower back pain: She has known lumbar spinal stenosis and disc disease. Activity as tolerated. Continue with Rx as prescribed. Continue to follow-up with Orthopedics. 2.  Hypertension:  Continue with Rx as prescribed.  I will check labs per patient request (CMP, urine protein screen, and a lipid panel) in 6 months. The patient is requesting to have labs checked every 6 months although for her underlying conditions, this frequency of having her labs checked is unnecessary per discussion I had with her today. Per standard guidelines, she needs to have labs once a year to assess her electrolytes and renal function. 3.  Anxiety: Stable per patient history. Continue with Rx as prescribed. 4. HLD/LEVY: Stable. +Prediabetes. Continue with Rx as prescribed. I will check labs as mentioned above at 6-month intervals. 5. Recent UTI? We had a very long discussion about how it is not recommended to screen people for bladder infections if they are asymptomatic and not undergoing any type of urologic procedure. The patient seemed very adamant that she needed additional antibiotics. She also states that her daughter-in-law is a pediatrician and does not see any reason why the patient should not have 7 days additional of antibiotics, although she is asymptomatic. I volunteered to speak to her daughter-in-law and to her gynecologist regarding her concerns and present guidelines. The patient seems very upset that I would not comply with her request for a second week of antibiotics, for likely 1 night before addiction to begin with. 6. Health Maintenance:   She is requesting for all labs to be checked at six-month intervals as mentioned above.   We will check her vitamin D level per her request.  It has not been checked in the past 2 years but it was normal when checked in 2018. Lab review: labs are reviewed in the EHR and ordered as mentioned above. I have discussed the diagnosis with the patient and the intended plan as seen in the above orders. I have discussed medication side effects and warnings with the patient as well. I have reviewed the plan of care with the patient, accepted their input and they are in agreement with the treatment goals. All questions were answered. The patient understands the plan of care. Pt instructed if symptoms worsen to call the office or report to the ED for continued care. Greater than 50% of the visit time was spent in counseling and/or coordination of care. I spent 40 minutes with the patient for this encounter, 25 of which were spent counseling her as described above. Voice recognition was used to generate this report, which may have resulted in some phonetic based errors in grammar and contents. Even though attempts were made to correct all the mistakes, some may have been missed, and remained in the body of the document. Subjective:   Walter Kuo was seen for   Chief Complaint   Patient presents with    Follow-up     The patient is a 73-year-old female with history of carotid artery stenosis,  LEVY, gastritis (10/17/15), diverticulosis, prediabetes, thoracic disc disease, lumbar disc disease, osteoarthritis per EHR (followed by ), right rotator cuff tendinopathy, lumbar spinal stenosis, HLD, and OAB per EHR. 1. Lower Back Pain: She is using a cane 2/2 OA related pain along her spine. +Followed by Orthopedics. S/p lumbar MRI. +Tramadol, flexeril, and gabapentin (she cut down in between apts to 1 capsule per day). No adverse side effects from these rx. Pain is worse with standing. PT was recommended by Orthopedics. Pain is 8/10. She has not scheduled with PT due to the Covid-19 pandemic. Lower back pain radiates down her RLE. She has known lumbar disc disease and lumbar spinal stenosis.   She also has thoracic disc disease. 2. HTN: Home BP checks: yes but not recently. Taking 10mg daily of lasix. 3. Anxiety: Taking buspar and effexor. No depression. 4. Health Maintenance:  - UTD on her eye exam.  - She had a PAP with her GYN in between apts. Per pt hx, it was WNL.  - She had a mammogram per her GYN team and it was WNL. 5. HLD/LEVY: Taking zocor. No adverse side effects. Meanwhile, her total cholesterol was 132. HDL was 49. LDL was 58. Her triglycerides were 125. Overall, her cholesterol is roughly unchanged from a year ago. No excessive alcoholic beverage consumption. 6. Prediabetes: Her most recent A1c is 5.8. It was 5.7 in February. 7. H/o UTI: She just completed a seven-day course of antibiotics for a presumed UTI although she was asymptomatic at the time she was diagnosed. A screening urinalysis was done by her gynecologist, who completed a Pap smear at a recent visit. The screening urinalysis showed bacteriuria. The patient was subsequently treated with antibiotics per her history. She is asking for 7 additional days since her previous PCP, per her history, would typically prescribe 7 extra days to make sure it is \"all gone. \"  Note that she is asymptomatic today. Prior to Admission medications    Medication Sig Start Date End Date Taking? Authorizing Provider   simvastatin (ZOCOR) 20 mg tablet Take 1 Tab by mouth nightly. 7/21/20   Teo Snyder MD   cyclobenzaprine (FLEXERIL) 10 mg tablet take 1 tablet by mouth every 8 hours if needed for muscle spasm 7/6/20   Teo Snyder MD   traMADol Marcela Alanis) 50 mg tablet Take 50 mg by mouth two (2) times daily as needed. 1/29/20   Provider, Historical   glucosam-MSM-chond-hyaluron ac (GLUCOSAMINE-CHONDROITIN) 294--1 mg tab Take  by mouth daily. Provider, Historical   hydroCHLOROthiazide (HYDRODIURIL) 12.5 mg tablet Take 1 Tab by mouth nightly.  2/28/20   Teo Snyder MD   melatonin 3 mg tablet Take 3 mg by mouth nightly. Provider, Historical   gabapentin (NEURONTIN) 300 mg capsule Take 1 Cap by mouth two (2) times a day. Max Daily Amount: 600 mg. Patient taking differently: Take 300 mg by mouth daily. 8/28/19   Jackelin Mercado MD   nystatin-triamcinolone [facility]) 100,000-0.1 unit/gram-% ointment Apply  to affected area two (2) times a day. 8/28/19   Jackelin Mercado MD   VITAMIN D2 50,000 unit capsule TAKE 1 CAPSULE BY MOUTH EVERY THURSDAY. 1/16/19   Martina Cabrera MD   furosemide (LASIX) 20 mg tablet Take by mouth daily as needed for severe swelling in legs only 12/12/18   Martina Cabrera MD   busPIRone (BUSPAR) 10 mg tablet Take 1 Tab by mouth three (3) times daily. 8/15/18   Martina Cabrera MD   diclofenac (VOLTAREN) 1 % gel Apply  to affected area four (4) times daily. 7/16/18   Martina Cabrera MD   desonide (TRIDESILON) 0.05 % topical lotion Apply  to affected area two (2) times a day. Provider, Historical   psyllium seed, with sugar, (FIBER SUPPLEMENT PO) Take  by mouth daily. Provider, Historical   clobetasol (TEMOVATE) 0.05 % external solution Apply  to affected area two (2) times daily as needed. 3/19/18   Provider, Historical   tacrolimus (PROTOPIC) 0.1 % ointment Apply  to affected area two (2) times daily as needed. Provider, Historical   celecoxib (CELEBREX) 200 mg capsule Take 200 mg by mouth two (2) times a day. 2/9/18   Provider, Historical   ketoconazole (NIZORAL) 2 % shampoo Apply  to affected area as needed. 11/26/17   Provider, Historical   raloxifene (EVISTA) 60 mg tablet Take 60 mg by mouth daily. 2/8/18   Provider, Historical   venlafaxine-SR (EFFEXOR-XR) 75 mg capsule Take 75 mg by mouth daily.  1/8/18   Provider, Historical     Allergies   Allergen Reactions    Hydrocodone Bitartrate Nausea Only    Ibuprofen Other (comments)     Platelets drop     Iodinated Contrast Media Other (comments) and Swelling    Iodine Swelling    Nsaids (Non-Steroidal Anti-Inflammatory Drug) Other (comments)     DECREASED PLATELETS    Other Medication Other (comments) and Swelling     Past Medical History:   Diagnosis Date    Arthritis     Breast tumor     Chronic pain     Hypercholesterolemia      No past surgical history on file. Family History   Problem Relation Age of Onset    Hypertension Mother     Diabetes Mother     Stroke Father     Heart Disease Brother      Social History     Socioeconomic History    Marital status:      Spouse name: Not on file    Number of children: Not on file    Years of education: Not on file    Highest education level: Not on file   Tobacco Use    Smoking status: Former Smoker     Types: Cigarettes    Smokeless tobacco: Never Used    Tobacco comment: in college   Substance and Sexual Activity    Alcohol use: Yes     Alcohol/week: 1.0 standard drinks     Types: 1 Glasses of wine per week     Comment: socially    Drug use: No    Sexual activity: Not Currently     Partners: Male       ROS:  Gen: No fever/chills  HEENT: No sore throat, eye pain, ear pain, or congestion.  No HA  CV: No CP  Resp: No cough/SOB  GI: No abdominal pain  : No hematuria/dysuria  Derm: No rash  Neuro: No new paresthesias/weakness  Musc: No new myalgias/jt pain  Psych: No depression sx      Objective:     General: alert, cooperative, no distress   Mental  status: mental status: alert, oriented to person, place, and time, normal mood, behavior, speech, dress, motor activity, and thought processes   Resp: resp: normal effort and no respiratory distress   Neuro: neuro: no gross deficits   Skin: skin: no discoloration or lesions of concern on visible areas     LABS:  Lab Results   Component Value Date/Time    Sodium 134 08/17/2020 09:50 AM    Potassium 4.3 08/17/2020 09:50 AM    Chloride 95 (L) 08/17/2020 09:50 AM    CO2 23 08/17/2020 09:50 AM    Anion gap 7 02/17/2020 09:50 AM    Glucose 98 08/17/2020 09:50 AM    BUN 13 08/17/2020 09:50 AM    Creatinine 0.75 08/17/2020 09:50 AM    BUN/Creatinine ratio 17 08/17/2020 09:50 AM    GFR est AA 88 08/17/2020 09:50 AM    GFR est non-AA 76 08/17/2020 09:50 AM    Calcium 9.5 08/17/2020 09:50 AM       Lab Results   Component Value Date/Time    Cholesterol, total 132 08/17/2020 09:50 AM    HDL Cholesterol 49 08/17/2020 09:50 AM    LDL, calculated 58 08/17/2020 09:50 AM    VLDL, calculated 25 08/17/2020 09:50 AM    Triglyceride 125 08/17/2020 09:50 AM    CHOL/HDL Ratio 2.3 02/17/2020 09:50 AM       Lab Results   Component Value Date/Time    WBC 5.4 08/17/2020 09:50 AM    HGB 13.7 08/17/2020 09:50 AM    HCT 41.8 08/17/2020 09:50 AM    PLATELET 242 64/96/2954 09:50 AM    MCV 94 08/17/2020 09:50 AM       Lab Results   Component Value Date/Time    Hemoglobin A1c 5.8 (H) 08/17/2020 09:50 AM       Lab Results   Component Value Date/Time    TSH 1.90 08/15/2018 12:22 PM           Due to this being a TeleHealth  evaluation, many elements of the physical examination are unable to be assessed. The pt was seen by synchronous (real-time) audio-video technology, and/or her healthcare decision maker, is aware that this patient-initiated, Telehealth encounter is a billable service, with coverage as determined by her insurance carrier. She is aware that she may receive a bill and has provided verbal consent to proceed: Yes. The pt is being evaluated by a video visit encounter for concerns as above. A caregiver was present when appropriate. Due to this being a TeleHealth encounter ([facility]), evaluation of the following organ systems was limited: Vitals/Constitutional/EENT/Resp/CV/GI//MS/Neuro/Skin/Heme-Lymph-Imm.    Pursuant to the emergency declaration under the [facility], [facility]5 waiver authority and the [employer] Act, this Virtual  Visit was conducted, with patient's (and/or legal guardian's) consent, to reduce the patient's risk of exposure to COVID-19 and provide necessary medical care. Services were provided through a video synchronous discussion virtually to substitute for in-person clinic visit. Patient and provider were located at their individual homes. We discussed the expected course, resolution and complications of the diagnosis(es) in detail. Medication risks, benefits, costs, interactions, and alternatives were discussed as indicated. I advised her to contact the office if her condition worsens, changes or fails to improve as anticipated. She expressed understanding with the diagnosis(es) and plan.      Ugo Rivera MD

## 2020-08-28 NOTE — TELEPHONE ENCOUNTER
I called the patient to let her and her  know Dr. Kyra Mata is running a few minutes behind and hopefully that was okay to still have their virtual visit? The patient's are okay with that notification.

## 2020-09-01 NOTE — PROGRESS NOTES
INTERNISTS [facility]:  09/01/20, 201537      The Subsequent Medicare Annual Wellness Exam PROGRESS NOTE    This is a Subsequent Medicare Annual Wellness Exam (AWV). I have reviewed the patient's medical history in detail and updated the computerized patient record. Ivelisse Lemus is a 79 y.o.  female and presents for an annual wellness exam.    SUBJECTIVE    Past Medical History:   Diagnosis Date    Arthritis     Breast tumor     Chronic pain     Hypercholesterolemia       No past surgical history on file. Current Outpatient Medications   Medication Sig Dispense Refill    simvastatin (ZOCOR) 20 mg tablet Take 1 Tab by mouth nightly. 90 Tab 3    cyclobenzaprine (FLEXERIL) 10 mg tablet take 1 tablet by mouth every 8 hours if needed for muscle spasm 30 Tab 3    traMADol (ULTRAM) 50 mg tablet Take 50 mg by mouth two (2) times daily as needed.  glucosam-MSM-chond-hyaluron ac (GLUCOSAMINE-CHONDROITIN) 324--1 mg tab Take  by mouth daily.  hydroCHLOROthiazide (HYDRODIURIL) 12.5 mg tablet Take 1 Tab by mouth nightly. 90 Tab 3    melatonin 3 mg tablet Take 3 mg by mouth nightly.  gabapentin (NEURONTIN) 300 mg capsule Take 1 Cap by mouth two (2) times a day. Max Daily Amount: 600 mg. (Patient taking differently: Take 300 mg by mouth daily.) 60 Cap 5    nystatin-triamcinolone (MYCOLOG) 100,000-0.1 unit/gram-% ointment Apply  to affected area two (2) times a day. 30 g 5    VITAMIN D2 50,000 unit capsule TAKE 1 CAPSULE BY MOUTH EVERY THURSDAY. 12 Cap 3    furosemide (LASIX) 20 mg tablet Take by mouth daily as needed for severe swelling in legs only 30 Tab 0    busPIRone (BUSPAR) 10 mg tablet Take 1 Tab by mouth three (3) times daily. 90 Tab 3    diclofenac (VOLTAREN) 1 % gel Apply  to affected area four (4) times daily. 100 g 1    desonide (TRIDESILON) 0.05 % topical lotion Apply  to affected area two (2) times a day.       psyllium seed, with sugar, (FIBER SUPPLEMENT PO) Take  by mouth daily.  clobetasol (TEMOVATE) 0.05 % external solution Apply  to affected area two (2) times daily as needed. 0    tacrolimus (PROTOPIC) 0.1 % ointment Apply  to affected area two (2) times daily as needed.  celecoxib (CELEBREX) 200 mg capsule Take 200 mg by mouth two (2) times a day.  0    ketoconazole (NIZORAL) 2 % shampoo Apply  to affected area as needed. 0    raloxifene (EVISTA) 60 mg tablet Take 60 mg by mouth daily.  venlafaxine-SR (EFFEXOR-XR) 75 mg capsule Take 75 mg by mouth daily. 0     Allergies   Allergen Reactions    Hydrocodone Bitartrate Nausea Only    Ibuprofen Other (comments)     Platelets drop     Iodinated Contrast Media Other (comments) and Swelling    Iodine Swelling    Nsaids (Non-Steroidal Anti-Inflammatory Drug) Other (comments)     DECREASED PLATELETS    Other Medication Other (comments) and Swelling     Family History   Problem Relation Age of Onset    Hypertension Mother     Diabetes Mother     Stroke Father     Heart Disease Brother      Social History     Tobacco Use    Smoking status: Former Smoker     Types: Cigarettes    Smokeless tobacco: Never Used    Tobacco comment: in college   Substance Use Topics    Alcohol use:  Yes     Alcohol/week: 1.0 standard drinks     Types: 1 Glasses of wine per week     Comment: socially     Patient Active Problem List   Diagnosis Code    Hypercholesterolemia E78.00    Arthritis M19.90    History of recurrent UTIs Z87.440    Overactive bladder N32.81    Impaired glucose tolerance R73.02    Diverticulosis K57.90    LEVY (nonalcoholic steatohepatitis) K75.81    Carotid artery stenosis I65.29    Gastritis K29.70    Lumbar disc disease M51.9    Lumbar spinal stenosis M48.061    Tendinopathy of right rotator cuff M67.911    Thoracic disc disease M51.9    Anxiety F41.9    Chronic pain syndrome G89.4    Essential hypertension I10     The patient is a 73-year-old female with history of carotid artery stenosis,  LEVY, gastritis (10/17/15), diverticulosis, prediabetes, thoracic disc disease, lumbar disc disease, osteoarthritis per EHR (followed by ), right rotator cuff tendinopathy, lumbar spinal stenosis, HLD, and OAB per EHR. Health Maintenance History  Immunizations reviewed: Tdap up to date   Pneumovax: up to date   Flu: over-due  Zoster: up to date, over-due, pt declines this vaccination today    Immunization History   Administered Date(s) Administered    Influenza High Dose Vaccine PF 10/16/2012, 10/29/2013, 09/02/2014, 10/08/2015    Influenza Vaccine 11/17/2003, 12/06/2004, 10/27/2005, 10/26/2006, 10/24/2007, 10/24/2008, 11/08/2011, 09/29/2016, 09/19/2017    Influenza Vaccine (Tri) Adjuvanted 10/12/2018, 10/10/2019    Pneumococcal Conjugate (PCV-13) 03/17/2015    Pneumococcal Polysaccharide (PPSV-23) 12/01/2003, 03/03/2011    Tdap 07/17/2012    Zoster Recombinant 04/26/2018, 06/06/2018, 07/11/2018, 09/19/2018, 09/20/2018    Zoster Vaccine, Live 02/23/2009       Colonoscopy: No bleeding. UTD    Eye exam: UTD    Mammo: UTD    Dexascan: UTD    Pelvic/Pap: No bleeding      Review of Systems   Constitutional: Negative for chills and fever. HENT: Negative for ear pain and sore throat. Eyes: Negative for blurred vision and pain. Respiratory: Negative for cough and shortness of breath. Cardiovascular: Negative for chest pain. Gastrointestinal: Negative for abdominal pain, blood in stool and melena. Genitourinary: Negative for dysuria and urgency. Musculoskeletal: Positive for joint pain (chronic). Skin: Negative for rash. Neurological: Negative for tingling, focal weakness and headaches. Endo/Heme/Allergies: Does not bruise/bleed easily. Psychiatric/Behavioral: Negative for depression and substance abuse.        Depression Risk Factor Screening:      Patient Health Questionnaire (PHQ-2)   Over the last 2 weeks, how often have you been bothered by any of the following problems? · Little interest or pleasure in doing things? · Not at all. [0]  · Feeling down, depressed, or hopeless? · Not at all. [0]    Total Score: 0/6  PHQ-2 Assessment Scoring:   A score of 2 or more requires further screening with the PHQ-9    Alcohol Risk Factor Screening:   Women: On any occasion during the past 3 months, have you had more than 3 drinks containing alcohol? no    Do you average more than 7 drinks per week? no    Tobacco Use Screening:     Social History     Tobacco Use   Smoking Status Former Smoker    Types: Cigarettes   Smokeless Tobacco Never Used   Tobacco Comment    in college       Hearing Loss    hearing is unchanged    Activities of Daily Living   Self-care. Requires assistance with: no ADLs    Fall Risk   she has had 3 falls in the past year    Abuse Screen   None    Additional Examination Findings: There were no vitals filed for this visit. There is no height or weight on file to calculate BMI. Evaluation of Cognitive Function:  Mood/affect: Euthymic  Appearance: Well-groomed  Family member/caregiver input: She is accompanied by her  during today's visit. General:   Well-nourished, well-groomed, pleasant, alert, in no acute distress.      Head:  Normocephalic, atraumatic  Ears:  External ears WNL  Eyes:  Clear sclera  Neuro:   Alert, conversant, appropriate, following commands  Skin:    No rashes noted  Psych:  Affect, mood and judgment appropriate      Dementia Screen:  Unremarkable    LABS   Data Review:   Lab Results   Component Value Date/Time    Sodium 134 08/17/2020 09:50 AM    Potassium 4.3 08/17/2020 09:50 AM    Chloride 95 (L) 08/17/2020 09:50 AM    CO2 23 08/17/2020 09:50 AM    Anion gap 7 02/17/2020 09:50 AM    Glucose 98 08/17/2020 09:50 AM    BUN 13 08/17/2020 09:50 AM    Creatinine 0.75 08/17/2020 09:50 AM    BUN/Creatinine ratio 17 08/17/2020 09:50 AM    GFR est AA 88 08/17/2020 09:50 AM    GFR est non-AA 76 08/17/2020 09:50 AM Calcium 9.5 08/17/2020 09:50 AM       Lab Results   Component Value Date/Time    WBC 5.4 08/17/2020 09:50 AM    HGB 13.7 08/17/2020 09:50 AM    HCT 41.8 08/17/2020 09:50 AM    PLATELET 897 16/18/9530 09:50 AM    MCV 94 08/17/2020 09:50 AM       Lab Results   Component Value Date/Time    Hemoglobin A1c 5.8 (H) 08/17/2020 09:50 AM       Lab Results   Component Value Date/Time    Cholesterol, total 132 08/17/2020 09:50 AM    HDL Cholesterol 49 08/17/2020 09:50 AM    LDL, calculated 58 08/17/2020 09:50 AM    VLDL, calculated 25 08/17/2020 09:50 AM    Triglyceride 125 08/17/2020 09:50 AM    CHOL/HDL Ratio 2.3 02/17/2020 09:50 AM       Patient Care Team:  Fco Alejo MD as PCP - General (Family Medicine)  Fco Alejo MD as PCP - [facility] Empaneled Provider    End-of-life planning  Advanced Directive in the case than an injury or illness causes the patient to be unable to make health care decisions was discussed with the patient. Advice/Referrals/Counselling/Plan:   Education and counseling provided:  Are appropriate based on today's review and evaluation  End-of-Life planning (with patient's consent)  Pneumococcal Vaccine  Influenza Vaccine  Screening Mammography  Colorectal cancer screening tests  Cardiovascular screening blood test  Bone mass measurement (DEXA)  Screening for glaucoma  Diabetes screening test  Include in education list (weight loss, physical activity, smoking cessation, fall prevention, and nutrition)    ICD-10-CM ICD-9-CM    1. Essential hypertension  O94 172.5 METABOLIC PANEL, COMPREHENSIVE      HEMOGLOBIN A1C W/O EAG      MICROALBUMIN, UR, RAND W/ MICROALB/CREAT RATIO      LIPID PANEL   2. Lumbar disc disease  M51.9 722.93    3. LEVY (nonalcoholic steatohepatitis)  B50.55 255.5 METABOLIC PANEL, COMPREHENSIVE      LIPID PANEL      CBC WITH AUTOMATED DIFF   4. Hyperglycemia  X84.6 005.16 METABOLIC PANEL, COMPREHENSIVE      HEMOGLOBIN A1C W/O EAG   5.  Urinary tract infection without hematuria, site unspecified  N39.0 599.0    6. Vitamin D deficiency  E55.9 268.9 VITAMIN D, 25 HYDROXY      METABOLIC PANEL, COMPREHENSIVE   . Brief written plan, checklist    Assessment/Plan:    Health Maintenance:  - I encouraged her to get her flu vaccine   - PT discussed given her multiple falls within the past year. Pt will let me know if she is interested in this option. Lab review: labs are reviewed in the [address] [place] [address] (ACP) Provider Conversation     Date of ACP Conversation: 8/28/20  Persons included in Conversation:  patient and family    Authorized Decision Maker (if patient is incapable of making informed decisions): This person is:   Next of Kin by law (only applies in absence of above)          For Patients with Decision Making Capacity:   Values/Goals: Exploration of values, goals, and preferences if recovery is not expected, even with continued medical treatment in the event of:  Imminent death  Severe, permanent brain injury    Conversation Outcomes / Follow-Up Plan:   Recommended completion of Advance Directive. I encouraged her to complete her advance directive. She wants her son Saloni Salinas to be her successor [address] [place], [place] at [phone]). His wife is a pediatrician Mirianbharat Medrano) and is to be another successor [address] [place] [address] ([phone]). She wants her  to be her primary POA. We will mail her a copy of her advance directive to complete. I have discussed the diagnosis with the patient and the intended plan as seen in the above orders. The patient has received an after-visit summary and questions were answered concerning future plans. I have discussed medication side effects and warnings with the patient as well. I have reviewed the plan of care with the patient, accepted their input and they are in agreement with the treatment goals.

## 2020-09-08 NOTE — PATIENT INSTRUCTIONS
Medicare Wellness Visit, Female The best way to live healthy is to have a lifestyle where you eat a well-balanced diet, exercise regularly, limit alcohol use, and quit all forms of tobacco/nicotine, if applicable. Regular preventive services are another way to keep healthy. Preventive services (vaccines, screening tests, monitoring & exams) can help personalize your care plan, which helps you manage your own care. Screening tests can find health problems at the earliest stages, when they are easiest to treat. [facility] follows the current, evidence-based guidelines published by the [facility] ([facility]) when recommending preventive services for our patients. Because we follow these guidelines, sometimes recommendations change over time as research supports it. (For example, mammograms used to be recommended annually. Even though Medicare will still pay for an annual mammogram, the newer guidelines recommend a mammogram every two years for women of average risk). Of course, you and your doctor may decide to screen more often for some diseases, based on your risk and your co-morbidities (chronic disease you are already diagnosed with). Preventive services for you include: - Medicare offers their members a free annual wellness visit, which is time for you and your primary care provider to discuss and plan for your preventive service needs. Take advantage of this benefit every year! 
-All adults over the age of 72 should receive the recommended pneumonia vaccines. Current USPSTF guidelines recommend a series of two vaccines for the best pneumonia protection.  
-All adults should have a flu vaccine yearly and a tetanus vaccine every 10 years.  
-All adults age 48 and older should receive the shingles vaccines (series of two vaccines). -All adults age 38-68 who are overweight should have a diabetes screening test once every three years. -All adults born between [birth year] and 1964 should be screened once for Hepatitis C. 
-Other screening tests and preventive services for persons with diabetes include: an eye exam to screen for diabetic retinopathy, a kidney function test, a foot exam, and stricter control over your cholesterol.  
-Cardiovascular screening for adults with routine risk involves an electrocardiogram (ECG) at intervals determined by your doctor.  
-Colorectal cancer screenings should be done for adults age 54-65 with no increased risk factors for colorectal cancer. There are a number of acceptable methods of screening for this type of cancer. Each test has its own benefits and drawbacks. Discuss with your doctor what is most appropriate for you during your annual wellness visit. The different tests include: colonoscopy (considered the best screening method), a fecal occult blood test, a fecal DNA test, and sigmoidoscopy. 
 
-A bone mass density test is recommended when a woman turns 65 to screen for osteoporosis. This test is only recommended one time, as a screening. Some providers will use this same test as a disease monitoring tool if you already have osteoporosis. -Breast cancer screenings are recommended every other year for women of normal risk, age 54-69. 
-Cervical cancer screenings for women over age 72 are only recommended with certain risk factors. Here is a list of your current Health Maintenance items (your personalized list of preventive services) with a due date: 
Health Maintenance Due Topic Date Due  Yearly Flu Vaccine (1) 09/01/2020 [address] [place] [address] Annual Well Visit  08/28/2020  Glaucoma Screening   09/18/2020 Well Visit, Over 72: Care Instructions Your Care Instructions Physical exams can help you stay healthy. Your doctor has checked your overall health and may have suggested ways to take good care of yourself. He or she also may have recommended tests.  At home, you can help prevent illness with healthy eating, regular exercise, and other steps. Follow-up care is a key part of your treatment and safety. Be sure to make and go to all appointments, and call your doctor if you are having problems. It's also a good idea to know your test results and keep a list of the medicines you take. How can you care for yourself at home? · Reach and stay at a healthy weight. This will lower your risk for many problems, such as obesity, diabetes, heart disease, and high blood pressure. · Get at least 30 minutes of exercise on most days of the week. Walking is a good choice. You also may want to do other activities, such as running, swimming, cycling, or playing tennis or team sports. · Do not smoke. Smoking can make health problems worse. If you need help quitting, talk to your doctor about stop-smoking programs and medicines. These can increase your chances of quitting for good. · Protect your skin from too much sun. When you're outdoors from 10 a.m. to 4 p.m., stay in the shade or cover up with clothing and a hat with a wide brim. Wear sunglasses that block UV rays. Even when it's cloudy, put broad-spectrum sunscreen (SPF 30 or higher) on any exposed skin. · See a dentist one or two times a year for checkups and to have your teeth cleaned. · Wear a seat belt in the car. Follow your doctor's advice about when to have certain tests. These tests can spot problems early. For men and women · Cholesterol. Your doctor will tell you how often to have this done based on your overall health and other things that can increase your risk for heart attack and stroke. · Blood pressure. Have your blood pressure checked during a routine doctor visit. Your doctor will tell you how often to check your blood pressure based on your age, your blood pressure results, and other factors. · Diabetes. Ask your doctor whether you should have tests for diabetes. · Vision. Experts recommend that you have yearly exams for glaucoma and other age-related eye problems. · Hearing. Tell your doctor if you notice any change in your hearing. You can have tests to find out how well you hear. · Colon cancer tests. Keep having colon cancer tests as your doctor recommends. You can have one of several types of tests. · Heart attack and stroke risk. At least every 4 to 6 years, you should have your risk for heart attack and stroke assessed. Your doctor uses factors such as your age, blood pressure, cholesterol, and whether you smoke or have diabetes to show what your risk for a heart attack or stroke is over the next 10 years. · Osteoporosis. Talk to your doctor about whether you should have a bone density test to find out whether you have thinning bones. Ask your doctor if you need to take a calcium plus vitamin D supplement. You may be able to get enough calcium and vitamin D through your diet. For women · Pap test and pelvic exam. You may no longer need a Pap test. Talk with your doctor about whether to stop or continue to have Pap tests. · Breast exam and mammogram. Ask how often you should have a mammogram, which is an X-ray of your breasts. A mammogram can spot breast cancer before it can be felt and when it is easiest to treat. · Thyroid disease. Talk to your doctor about whether to have your thyroid checked as part of a regular physical exam. Women have an increased chance of a thyroid problem. For men · Prostate exam. Talk to your doctor about whether you should have a blood test (called a PSA test) for prostate cancer. Experts recommend that you discuss the benefits and risks of the test with your doctor before you decide whether to have this test. Some experts say that men ages 79 and older no longer need testing. · Abdominal aortic aneurysm. Ask your doctor whether you should have a test to check for an aneurysm.  You may need a test if you ever smoked or if your parent, brother, sister, or child has had an aneurysm. When should you call for help? Watch closely for changes in your health, and be sure to contact your doctor if you have any problems or symptoms that concern you. Where can you learn more? Go to http://www.gray.com/ Enter Z275 in the search box to learn more about \"Well Visit, Over 65: Care Instructions. \" Current as of: May 27, 2020               Content Version: 12.6 © 6241-2120 [employer]. Care instructions adapted under license by [employer] (which disclaims liability or warranty for this information). If you have questions about a medical condition or this instruction, always ask your healthcare professional. [address] any warranty or liability for your use of this information.

## 2020-09-08 NOTE — ACP (ADVANCE CARE PLANNING)
Advance Care Planning  Advance Care Planning (ACP) Provider Conversation     Date of ACP Conversation: 8/28/20  Persons included in Conversation:  patient and family    Authorized Decision Maker (if patient is incapable of making informed decisions): This person is:   Next of Kin by law (only applies in absence of above)          For Patients with Decision Making Capacity:   Values/Goals: Exploration of values, goals, and preferences if recovery is not expected, even with continued medical treatment in the event of:  Imminent death  Severe, permanent brain injury    Conversation Outcomes / Follow-Up Plan:   Recommended completion of Advance Directive. I encouraged her to complete her advance directive. She wants her son Abel Dsouza to be her successor [address] [place], [place] at [phone]). His wife is a pediatrician Julio C Gallardo) and is to be another successor [address] [place] [address] ([phone]). She wants her  to be her primary POA. We will mail her a copy of her advance directive to complete.     Dr. Evangelista Alberto  [facility] [address]  Phone: [phone]  Fax: [phone]

## 2020-09-09 ENCOUNTER — TELEPHONE (OUTPATIENT)
Dept: INTERNAL MEDICINE CLINIC | Age: 80
End: 2020-09-09

## 2020-09-09 NOTE — TELEPHONE ENCOUNTER
----- Message from Franny Freeman MD sent at 9/7/2020 11:16 PM EDT -----  Regarding: F/u apts needed  Please schedule for a 30 min follow up apt in March of 2021. Please schedule for labs 1 wk before her follow up apt.     Thanks,  Dr. Laureen Watkins  [facility], [address] [place] [address], [address] [place] [address] Str.  Phone: [phone]  Fax: [phone]

## 2020-09-17 NOTE — PROGRESS NOTES
Chuy Vilchis  Jarred Gonzalez MD;  Location: [facility] CATH INVASIVE LOCATION;  Service: Cardiology;  Laterality: N/A;   • COLONOSCOPY N/A 8/24/2018    Procedure: COLONOSCOPY;  Surgeon: Asael Patterson MD;  Location: A.O. Fox Memorial David Phelps MD;  Location: [facility] OR;  Service: Cardiothoracic;   PT DAILY TREATMENT NOTE - [facility]     Patient Name: Orville Tejada  Date:10/25/2018  : 1940  [x]  Patient  Verified  [place] / Plan: [place] & B / Product Type: Medicare /    In time:1200  Out time:1252  Total Treatment Time (min): 52  Total Timed Codes (min): 42  1:1 Treatment Time ( only): 42   Visit #: 14 of 16    Treatment Area: Pain in right shoulder [M25.511]    SUBJECTIVE  Pain Level (0-10 scale): 1  Any medication changes, allergies to medications, adverse drug reactions, diagnosis change, or new procedure performed?: [x] No    [] Yes (see summary sheet for update)  Subjective functional status/changes:   [] No changes reported  Patient reports no increase in discomfort after completion of last treatment session.     OBJECTIVE    Modality rationale: decrease inflammation and decrease pain to improve the patients ability to improve ease with sleep   Min Type Additional Details     10 [x]  Ice     []  heat  []  Ice massage Position: Reclined  Location: Right Shoulder, Post-Tx        14 min Therapeutic Exercise:  [x] See flow sheet : Emphasis placed on improving available shoulder AROM and strength and promotion of right shoulder PROM/AAROM   Rationale: increase ROM and increase strength to improve the patients ability to improve ease with self-care ADLs      8 min Manual Therapy:    Supine, Right Shoulder Physiological Flexion Grade II-III Mobilization  Supine, Right Shoulder Physiological Scaption Grade II-III Mobilization  Supine, Right Shoulder Physiological Abduction Grade II-III Mobilization  Supine, Right GH Inferior Grade I-II Mobilization (OPP)  Supine, Right Shoulder Multi-Directional Rhythmic Stabilization (90 deg flexion, scapular protraction - at wrist)   Rationale: decrease pain, increase ROM and increase tissue extensibility to improve ease with functional ADLs      20 min Neuromuscular Re-education:  [x]  See flow sheet: Emphasis placed on promotion of Laterality: N/A;  Leg 6184-2054  Chest 6875-6752  activation and recruitment of the scapulothoracic and glenohumeral musculature   Rationale: increase ROM and increase strength  to improve the patients ability to improve ease with overhead lifting        With   [] TE   [] TA   [] neuro   [] other: Patient Education: [x] Review HEP    [] Progressed/Changed HEP based on:   [] positioning   [] body mechanics   [] transfers   [] heat/ice application    [] other:      Pain Level (0-10 scale) post treatment: 0    ASSESSMENT/Changes in Function: Plan to place patient on hold x30 days after completion of last scheduled appointment with patient to be provided with updated HEP to allow for independent progression post-discharge to improve ease with return to PLOF. Continued caution with exercise completion secondary to acute biceps tendinopathy. Patient will continue to benefit from skilled PT services to modify and progress therapeutic interventions, address functional mobility deficits, address ROM deficits, address strength deficits, analyze and address soft tissue restrictions, analyze and cue movement patterns and analyze and modify body mechanics/ergonomics to attain remaining goals. []  See Plan of Care  []  See progress note/recertification  []  See Discharge Summary         Progress towards goals / Updated goals:    Short Term Goals: To be accomplished in 3 weeks:  1. Patient will subjectively report full compliance with prescribed HEP. At PN: Met, HEP performance reported as prescribed, 9/7/2018  2. Patient will demonstrate right shoulder flexion and abduction PROM >/=160 degrees to improve ease with bathing. At PN: Progressing, Right Shoulder Flexion  PROM 160 deg, Right Shoulder Abduction PROM 145 deg, 10/1/2018  Current: Remains, Right Shoulder Flexion  PROM 160 deg, Right Shoulder Abduction PROM 145 deg, 10/9/2018  3. Patient will demonstrate right shoulder flexion and abduction AROM >/=140 degrees to improve ease with functional overhead ADLs.   At "PN: Progressing, Right Shoulder Flexion AROM 135 deg, Right Shoulder Abduction AROM 120 deg, 10/1/2018  Current: Met, Right Shoulder Flexion AROM 155 deg, Right Shoulder Abduction AROM 160 deg, 10/23/2018      Long Term Goals: To be accomplished in 6 weeks:  1. Patient will demonstrate a significant functional improvement as demonstrated by a score of >/=61 on FOTO. At PN: Progressing, FOTO = 52, 9/24/2018  Current: Met, FOTO = 61, 10/23/2018  2. Patient will demonstrate right shoulder flexion, abduction and ER MMT >/=4+/5 to improve ease with overhead lifting. At PN: Progressing,  Flexion 3+/5, Abduction 3+/5, ER 4/5, 10/4/2018  Current: Remains, Flexion 3+/5, Abduction 3+/5, ER 4/5, 10/23/2018  3. Patient will demonstrate right shoulder functional ER >/=C4 and functional IR >/=L3 to improve ease with dressing.   At PN: Progressing, Functional ER C2, Functional IR Right SIJ, 10/1/2018  Current: Progressing, Functional ER C5, Functional IR Right SIJ, 10/23/2018    PLAN  [x]  Upgrade activities as tolerated     [x]  Continue plan of care  []  Update interventions per flow sheet       []  Discharge due to:_  []  Other:_      Yaw Artis PT 10/25/2018  8:00 AM    Future Appointments   Date Time Provider Amalia Concepcion   10/25/2018 12:00 PM Nikia Kern PT [facility]   10/29/2018 10:30 AM Nikia Kern PT [facility]   12/12/2018 10:00 AM Evi Mccracken MD [facility] native or transplanted heart (CMS/[facility])  

## 2021-04-06 ENCOUNTER — HOSPITAL ENCOUNTER (OUTPATIENT)
Dept: PHYSICAL THERAPY | Age: 81
Discharge: HOME | End: 2021-04-06
Payer: MEDICARE

## 2021-04-06 PROCEDURE — 97162 PT EVAL MOD COMPLEX 30 MIN: CPT

## 2021-04-06 PROCEDURE — 97530 THERAPEUTIC ACTIVITIES: CPT

## 2021-04-06 PROCEDURE — 97110 THERAPEUTIC EXERCISES: CPT

## 2021-04-06 NOTE — PROGRESS NOTES
PT DAILY TREATMENT NOTE     Patient Name: Geena Iglesias  Date:2021  : 1940  [x]  Patient  Verified  [place] / Plan: [place] & B / Product Type: Medicare /    In time:  Out time:  Total Treatment Time (min): 39  Visit #: 1 of 8    Medicare/BCBS Only   Total Timed Codes (min):  23 1:1 Treatment Time:  39       Treatment Area: Right hip pain [M25.551]  Low back pain [M54.5]    Physical Therapy Evaluation - Lumbar    SUBJECTIVE      Any medication changes, allergies to medications, adverse drug reactions, diagnosis change, or new procedure performed?: [x] No    [] Yes (see summary sheet for update)    Subjective functional status/changes:     PLOF: Fall History, (I) Self-Care ADLs, (I) Functional ADLs, Ambulation without AD, (I) Driving  Current Functional Status: Difficulty with self-care ADLs, Difficulty with household ADLs, Limiting community involvement, Limited with ambulation, Ambulation with SPC  Work Hx: Retired  Living Situation: Lives with  - Lives in [facility])  Comorbidities: Former Smoker, Arthritis, HTN, Anxiety, Carotid Artery Stenosis, Pre-Diabetic, Osteoporosis  Medications: None Utilized    Subjective: Patient presents with low back pain with right LE weakness with patient reporting presence for multiple years with patient having undergone first lumbar MRI . Patient reports onset of falls within last 6-8 months with falls worse in AM upon waking secondary to right LE instability. Patient with receival of lumbar injections with some short term relief. Patient denies right LE pain. Patient with use of SPC with ambulation with onset of falls. Patient denies LE N/T but does report bilateral foot N/T (right>left) with patient reporting progressive worsening. Patient reports noticing with ambulation that she is not picking up her right foot.  Patient reports central and right lumbar pain without radiation with increase in forward lumbar flexion without provocation with walking/standing. Patient denies left LE weakness. Patient denies night time disturbances secondary to pain. Pain Intensity (0-10, VAS): Current 2, Worst 8, Best 2    Patient Goals: \"Pain free and reduce fall frequency. \"    OBJECTIVE EXAMINATION    BP: 132/74 mmHg  Posture: Symmetrical iliac crest height, Unweighting of right LE in static standing, No observable lateral trunk shift    Gait: With SPC, Decreased zain    Functional Tests:  1. Rhomberg (EC): 30 sec  2.  SLS: Left SLS < 2 sec / Right SLS < 2 sec    Active Movements: [] N/A   [] Too acute   [] Other:  ROM % AROM Comments   Forward flexion 40-60 25% limited    Extension 20-30 0% limited    SB right 20-30 25% limited    SB left 20-30 25% limited      Neuro Screen [] WNL  Dermatome: Diminished sensation right LE, non-dermatomal  Reflexes: 2+ L/R Patellar, 0+ L/R Achilles  Comments: (-) Left/Right Ankle Clonus    Dural Mobility:  SLR Supine: (-) Right     Palpation: No midline lumbar TTP, TTP right lumbar paraspinals    Joint Mobility   Central Lumbar PA: Localized pain with grade I-II CPA L1-L5    LE MMT    Left Right   Hip Flexion 5 4+ (non-fatigable)    Extension NT NT    Abduction 3 2+     ER 5 4+ (non-fatigable)    IR 5 5   Knee Flexion 5 5    Extension 5 5   Ankle Dorsiflexion 5 5    Hallux Ext 5 5    Ankle Eversion 5 5   *Assessed in supine    Special Tests       Hip: [place]:  [x] R    [] L    [] +    [x] -     Scour:  [x] R    [] L    [] +    [x] -     FADDIR: [x] R    [] L    [] +    [x] -     OBJECTIVE    16 min [x]Eval                  []Re-Eval     10 min Therapeutic Exercise:  [x] See flow sheet : Patient educated regarding completion of prescribed HEP and provided with written HEP instructions, Patient educated regarding diagnosis and PT POC   Rationale: increase ROM and increase strength to improve the patients ability to improve ease with functional ADLs    13 min Therapeutic Activity:  [x]  See flow sheet : Patient educated regarding fall prevention with clinician review regarding sequencing SPC with ambulation, Correction of SPC height   Rationale: increase ROM, increase strength, improve balance and increase proprioception  to improve the patients ability to decrease falls risk. With   [] TE   [] TA   [] neuro   [] other: Patient Education: [x] Review HEP    [] Progressed/Changed HEP based on:   [] positioning   [] body mechanics   [] transfers   [] heat/ice application    [] other:      Other Objective/Functional Measures: See objective above. Pain Level (0-10 scale) post treatment: 2    ASSESSMENT/Changes in Function: Patient presents with chronic history of low back pain with recent insidious onset of falls ~9/2021 with patient objectively presenting with proximal right LE weakness. With symmetrical LE reflexes but objectively with non-dermatomal hyposensitivity to right LE. With gross, non-fatigable weakness of right hip complex but with full and non-fatigable weakness of the right knee and ankle/foot musculature. With diminished SLS stability bilaterally with patient presenting with a wide base of support with ambulation without AD. To emphasize improvements in right hip complex strength and closed-chain stability to improve static and dynamic balance and decrease falls risk and lumbopelvic mobility and strength. To continue to monitor presentation to ensure appropriate response to therapy and ensure non-progression of neurological symptoms.     Patient will continue to benefit from skilled PT services to modify and progress therapeutic interventions, address functional mobility deficits, address ROM deficits, address strength deficits, analyze and address soft tissue restrictions, analyze and cue movement patterns, analyze and modify body mechanics/ergonomics, assess and modify postural abnormalities, address imbalance/dizziness and instruct in home and community integration to attain remaining goals. [x]  See Plan of Care  []  See progress note/recertification  []  See Discharge Summary         Progress towards goals / Updated goals:    Short Term Goals: To be accomplished in 2 weeks:  1. Patient will subjectively report full compliance with prescribed HEP. Eval: HEP provided  2. Patient will demonstrate supine right hip flexion MMT 5/5 (non-fatigable) to improve ease with curb management. Eval: Supine Right Hip Flexion MMT = 4+/5 (non-fatigable)  3. Patient will demonstrate left/right hip abduction MMT >/= 4/5 to improve safety with ambulation on uneven surfaces. Eval: Left Hip Abduction MMT 3/5, Right Hip Abduction MMT 2+/5    Long Term Goals: To be accomplished in 4 weeks:  1. Patient will demonstrate a significant functional improvement as demonstrated by a score of >/= 50 on FOTO. Eval: FOTO = 42       2. Patient will demonstrate left/right SLS >/= 15 seconds to demonstrate a reduced falls risk. Eval: Left SLS < 2 sec / Right SLS < 2 sec  3. Patient will demonstrate ability to ascend/descend 12 stairs with a reciprocal gait pattern and single UE assist independently to improve safety with community ADLs.   Eval: NT      PLAN  [x]  Upgrade activities as tolerated     []  Continue plan of care  []  Update interventions per flow sheet       []  Discharge due to:_  []  Other:_      Lesley Aceves PT 4/6/2021  7:03 AM    Future Appointments   Date Time Provider Amalia Concepcion   4/6/2021  1:15 PM Lexi Evans PT [facility]

## 2021-04-06 NOTE — PROGRESS NOTES
[facility] - [facility]              [address]        [place], [address]   [phone] fax    Plan of Care/ Statement of Necessity for Physical Therapy Services    Patient name: Sandy Wilcox Start of Care: 2021   Referral source: Lore Frederick MD : 1940    Medical Diagnosis: Right hip pain [M25.551]  Low back pain [M54.5]  Payor: [employer] / Plan: [place] & B / Product Type: Medicare /  Onset Date:Chronic, Worsening 2021    Treatment Diagnosis: Low Back Pain with Right LE Weakness   Prior Hospitalization: see medical history Provider#: [id number]   Medications: Verified on Patient summary List    Comorbidities: Former Smoker, Arthritis, HTN, Anxiety, Carotid Artery Stenosis, Pre-Diabetic, Osteoporosis   Prior Level of Function: Fall History, (I) Self-Care ADLs, (I) Functional ADLs, Ambulation without AD, (I) Driving      The Plan of Care and following information is based on the information from the initial evaluation. Assessment:    Patient presents with chronic history of low back pain with recent insidious onset of falls ~2021 with patient objectively presenting with proximal right LE weakness. With symmetrical LE reflexes but objectively with non-dermatomal hyposensitivity to right LE. With gross, non-fatigable weakness of right hip complex but with full and non-fatigable weakness of the right knee and ankle/foot musculature. With diminished SLS stability bilaterally with patient presenting with a wide base of support with ambulation without AD. To emphasize improvements in right hip complex strength and closed-chain stability to improve static and dynamic balance and decrease falls risk and lumbopelvic mobility and strength.  To continue to monitor presentation to ensure appropriate response to therapy and ensure non-progression of neurological symptoms.     Patient will continue to benefit from skilled PT services to modify and progress therapeutic interventions, address functional mobility deficits, address ROM deficits, address strength deficits, analyze and address soft tissue restrictions, analyze and cue movement patterns, analyze and modify body mechanics/ergonomics, assess and modify postural abnormalities, address imbalance/dizziness and instruct in home and community integration to attain remaining goals. Key Information:    Functional Tests:  1. Rhomberg (EC): 30 sec  2. SLS: Left SLS < 2 sec / Right SLS < 2 sec     Lumbar Active Movements: []? N/A   []? Too acute   []? Other:  ROM % AROM Comments   Forward flexion 40-60 25% limited     Extension 20-30 0% limited     SB right 20-30 25% limited     SB left 20-30 25% limited        Neuro Screen []? WNL  Dermatome: Diminished sensation right LE, non-dermatomal  Reflexes: 2+ L/R Patellar, 0+ L/R Achilles  Comments: (-) Left/Right Ankle Clonus     Dural Mobility:  SLR Supine: (-) Right     LE MMT      Left Right   Hip Flexion 5 4+ (non-fatigable)     Extension NT NT     Abduction 3 2+      ER 5 4+ (non-fatigable)     IR 5 5   Knee Flexion 5 5     Extension 5 5   Ankle Dorsiflexion 5 5     Hallux Ext 5 5     Ankle Eversion 5 5   *Assessed in supine     Special Tests       Hip:            Marysol Panda:              [x]? R    []? L    []? +    [x]? -                           Scour:              [x]? R    []? L    []? +    [x]? -                           FADDIR:          [x]? R    []? L    []? +    [x]? -      Evaluation Complexity History MEDIUM  Complexity : 1-2 comorbidities / personal factors will impact the outcome/ POC ; Examination MEDIUM Complexity : 3 Standardized tests and measures addressing body structure, function, activity limitation and / or participation in recreation  ;Presentation MEDIUM Complexity : Evolving with changing characteristics  ; Clinical Decision Making MEDIUM Complexity : FOTO score of 26-74  Overall Complexity Rating: MEDIUM  Problem List: pain affecting function, decrease ROM, decrease strength, impaired gait/ balance, decrease ADL/ functional abilitiies, decrease activity tolerance, decrease flexibility/ joint mobility and decrease transfer abilities   Treatment Plan may include any combination of the following: Therapeutic exercise, Therapeutic activities, Neuromuscular re-education, Physical agent/modality, Gait/balance training, Manual therapy, Patient education, Self Care training, Functional mobility training, Home safety training and Stair training  Patient / Family readiness to learn indicated by: asking questions, trying to perform skills and interest  Persons(s) to be included in education: patient (P)  Barriers to Learning/Limitations: None  Patient Goal (s): Pain-free and reduce fall frequency  Patient Self Reported Health Status: good  Rehabilitation Potential: good    Short Term Goals: To be accomplished in 2 weeks:  1. Patient will subjectively report full compliance with prescribed HEP. Eval: HEP provided  2. Patient will demonstrate supine right hip flexion MMT 5/5 (non-fatigable) to improve ease with curb management. Eval: Supine Right Hip Flexion MMT = 4+/5 (non-fatigable)  3. Patient will demonstrate left/right hip abduction MMT >/= 4/5 to improve safety with ambulation on uneven surfaces. Eval: Left Hip Abduction MMT 3/5, Right Hip Abduction MMT 2+/5    Long Term Goals: To be accomplished in 4 weeks:  1. Patient will demonstrate a significant functional improvement as demonstrated by a score of >/= 50 on FOTO. Eval: FOTO = 42       2. Patient will demonstrate left/right SLS >/= 15 seconds to demonstrate a reduced falls risk. Eval: Left SLS < 2 sec / Right SLS < 2 sec  3. Patient will demonstrate ability to ascend/descend 12 stairs with a reciprocal gait pattern and single UE assist independently to improve safety with community ADLs. Eval: NT    Frequency / Duration: Patient to be seen 2 times per week for 4 weeks.     Patient/ Caregiver education and instruction: Diagnosis, prognosis, self care, activity modification and exercises   [x]  Plan of care has been reviewed with PTA      Certification Period: 4/6/2021 - 5/5/2021  dAele Mittal, PT 4/6/2021 7:05 AM  ________________________________________________________________________    I certify that the above Therapy Services are being furnished while the patient is under my care. I agree with the treatment plan and certify that this therapy is necessary.     [de-identified] Signature:____________Date:_________TIME:________     Jasmni Kessler MD  ** Signature, Date and Time must be completed for valid certification **  Please sign and return to [facility] - [address], [address] [place]   [phone] fax

## 2021-04-14 ENCOUNTER — HOSPITAL ENCOUNTER (OUTPATIENT)
Dept: PHYSICAL THERAPY | Age: 81
Discharge: HOME | End: 2021-04-14
Payer: MEDICARE

## 2021-04-14 PROCEDURE — 97110 THERAPEUTIC EXERCISES: CPT

## 2021-04-14 PROCEDURE — 97112 NEUROMUSCULAR REEDUCATION: CPT

## 2021-04-14 NOTE — PROGRESS NOTES
PT DAILY TREATMENT NOTE     Patient Name: Sandy Wilcox  Date:2021  : 1940  [x]  Patient  Verified  [place] / Plan: [place] & B / Product Type: Medicare /    In time:327  Out time:424  Total Treatment Time (min): 62  Visit #: 2 of 8    Medicare/BCBS Only   Total Timed Codes (min):  47 1:1 Treatment Time:  47       Treatment Area: Right hip pain [M25.551]  Low back pain [M54.5]    SUBJECTIVE  Pain Level (0-10 scale): 5  Any medication changes, allergies to medications, adverse drug reactions, diagnosis change, or new procedure performed?: [x] No    [] Yes (see summary sheet for update)  Subjective functional status/changes:   [] No changes reported  Patient reports increase in low back pain (left>right) with patient correlating to performance of household ADLs this AM. Patient reports with utilization of SPC on the left side with ambulation with a step-through gait pattern. OBJECTIVE    Modality rationale: decrease pain and increase tissue extensibility to improve the patients ability to improve ease with sleep   Min Type Additional Details   10 []  Ice     [x]  heat  []  Ice massage Position: Supine  Location: Lumbar, Post-tx     20 min Therapeutic Exercise:  [x] See flow sheet : Emphasis placed on improving available lumbopelvic and hip AROM and strength   Rationale: increase ROM and increase strength to improve the patients ability to improve ease with functional ADLs    27 min Neuromuscular Re-education:  [x]  See flow sheet : Emphasis placed on improving activation and recruitment of the anterior abdominal and gluteal musculature and improving LE proprioceptive and kinesthetic awareness   Rationale: increase ROM, increase strength, improve balance and increase proprioception  to improve the patients ability to decrease falls risk.            With   [] TE   [] TA   [] neuro   [] other: Patient Education: [x] Review HEP    [] Progressed/Changed HEP based on:   [] positioning   [] body mechanics   [] transfers   [] heat/ice application    [] other:      Other Objective/Functional Measures:   Supine Right Hip Flexion MMT = 4+/5 (non-fatigable)     Pain Level (0-10 scale) post treatment: 7    ASSESSMENT/Changes in Function: Initiated exercises per plan of care with exercises to be progressed next treatment session in accordance with patient's subjective response to care. With supine bridge performance with asymmetrical LE weigthshift with poor trunk stability. With poor eccentric control with exercise performance with unweighting of the right LE with closed-chain exercises. Patient will continue to benefit from skilled PT services to modify and progress therapeutic interventions, address functional mobility deficits, address ROM deficits, address strength deficits, analyze and address soft tissue restrictions, analyze and cue movement patterns, analyze and modify body mechanics/ergonomics, assess and modify postural abnormalities, address imbalance/dizziness and instruct in home and community integration to attain remaining goals. []  See Plan of Care  []  See progress note/recertification  []  See Discharge Summary         Progress towards goals / Updated goals:    Short Term Goals: To be accomplished in 2 weeks:  1. Patient will subjectively report full compliance with prescribed HEP. Eval: HEP provided  Current: Met, HEP performance reported as prescribed, 4/14/2021  2. Patient will demonstrate supine right hip flexion MMT 5/5 (non-fatigable) to improve ease with curb management. Eval: Supine Right Hip Flexion MMT = 4+/5 (non-fatigable)  Current: Remains, Supine Right Hip Flexion MMT = 4+/5 (non-fatigable), 4/14/2021  3. Patient will demonstrate left/right hip abduction MMT >/= 4/5 to improve safety with ambulation on uneven surfaces. Eval: Left Hip Abduction MMT 3/5, Right Hip Abduction MMT 2+/5     Long Term Goals: To be accomplished in 4 weeks:  1.  Patient will demonstrate a significant functional improvement as demonstrated by a score of >/= 50 on FOTO. Eval: FOTO = 42       2. Patient will demonstrate left/right SLS >/= 15 seconds to demonstrate a reduced falls risk. Eval: Left SLS < 2 sec / Right SLS < 2 sec  3. Patient will demonstrate ability to ascend/descend 12 stairs with a reciprocal gait pattern and single UE assist independently to improve safety with community ADLs.   Eval: NT    PLAN  [x]  Upgrade activities as tolerated     [x]  Continue plan of care  []  Update interventions per flow sheet       []  Discharge due to:_  []  Other:_      Jay Re, PT 4/14/2021  8:41 AM    Future Appointments   Date Time Provider Amalia Concepcion   4/14/2021  3:30 PM Paulette Mock, PT [facility]   4/16/2021  9:30 AM Paulette Mock, PT [facility]   4/19/2021 11:00 AM Paulette Mock, [place] [facility]   4/21/2021 11:45 AM Paulette Mock, PT [facility]   4/26/2021 10:15 AM Paulette Mock, PT [facility]   4/28/2021 11:45 AM Paulette Mock, PT [facility]   5/3/2021 10:15 AM Paulette Mock, PT [facility]   5/5/2021  3:30 PM Paulette Mock, PT [facility]

## 2021-04-15 ENCOUNTER — APPOINTMENT (OUTPATIENT)
Dept: PHYSICAL THERAPY | Age: 81
End: 2021-04-15
Payer: MEDICARE

## 2021-04-16 ENCOUNTER — HOSPITAL ENCOUNTER (OUTPATIENT)
Dept: PHYSICAL THERAPY | Age: 81
Discharge: HOME | End: 2021-04-16
Payer: MEDICARE

## 2021-04-16 PROCEDURE — 97112 NEUROMUSCULAR REEDUCATION: CPT

## 2021-04-16 PROCEDURE — 97110 THERAPEUTIC EXERCISES: CPT

## 2021-04-16 NOTE — PROGRESS NOTES
PT DAILY TREATMENT NOTE     Patient Name: Yolande Leroy  Date:2021  : 1940  [x]  Patient  Verified  [place] / Plan: [place] & B / Product Type: Medicare /    In time:931  Out time:1019  Total Treatment Time (min): 48  Visit #: 3 of 8    Medicare/BCBS Only   Total Timed Codes (min):  38 1:1 Treatment Time:  38       Treatment Area: Right hip pain [M25.551]  Low back pain [M54.5]    SUBJECTIVE  Pain Level (0-10 scale): 3  Any medication changes, allergies to medications, adverse drug reactions, diagnosis change, or new procedure performed?: [x] No    [] Yes (see summary sheet for update)  Subjective functional status/changes:   [] No changes reported  Patient reports after last treatment session generalized fatigue. Patient reports noting since start of care improved ease getting into and out of the 's seat of the car and improved ease with getting socks on. OBJECTIVE    Modality rationale: decrease pain and increase tissue extensibility to improve the patients ability to improve ease with sleep   Min Type Additional Details    10 []? Ice     [x]? heat  []? Ice massage Position: Supine  Location: Lumbar, Post-tx       15 min Therapeutic Exercise:  [x]? See flow sheet : Emphasis placed on improving available lumbopelvic and hip AROM and strength   Rationale: increase ROM and increase strength to improve the patients ability to improve ease with functional ADLs     23 min Neuromuscular Re-education:  [x]? See flow sheet : Emphasis placed on improving activation and recruitment of the anterior abdominal and gluteal musculature and improving LE proprioceptive and kinesthetic awareness   Rationale: increase ROM, increase strength, improve balance and increase proprioception  to improve the patients ability to decrease falls risk.         With   [] TE   [] TA   [] neuro   [] other: Patient Education: [x] Review HEP    [] Progressed/Changed HEP based on:   [] positioning   [] body mechanics   [] transfers   [] heat/ice application    [] other:      Other Objective/Functional Measures:   Supine Right Hip Flexion MMT = 4+/5 (non-fatigable)     Pain Level (0-10 scale) post treatment: 2    ASSESSMENT/Changes in Function: With generalized soreness reported after last treatment session with minimal modification/progression of exercises performed within session to ensure tolerance to completion. With continued closed-chain instability noted with right LE single-limb stance with inability to maintain level pelvic positioning with single-limb stance. Patient will continue to benefit from skilled PT services to modify and progress therapeutic interventions, address functional mobility deficits, address ROM deficits, address strength deficits, analyze and address soft tissue restrictions, analyze and cue movement patterns, analyze and modify body mechanics/ergonomics, assess and modify postural abnormalities, address imbalance/dizziness and instruct in home and community integration to attain remaining goals. []  See Plan of Care  []  See progress note/recertification  []  See Discharge Summary         Progress towards goals / Updated goals:    Short Term Goals: To be accomplished in 2 weeks:  1. Patient will subjectively report full compliance with prescribed HEP. Eval: HEP provided  Current: Met, HEP performance reported as prescribed, 4/14/2021  2. Patient will demonstrate supine right hip flexion MMT 5/5 (non-fatigable) to improve ease with curb management. Eval: Supine Right Hip Flexion MMT = 4+/5 (non-fatigable)  Current: Remains, Supine Right Hip Flexion MMT = 4+/5 (non-fatigable), 4/14/2021  3. Patient will demonstrate left/right hip abduction MMT >/= 4/5 to improve safety with ambulation on uneven surfaces. Eval: Left Hip Abduction MMT 3/5, Right Hip Abduction MMT 2+/5     Long Term Goals: To be accomplished in 4 weeks:  1.  Patient will demonstrate a significant functional improvement as demonstrated by a score of >/= 50 on FOTO. Eval: FOTO = 42       2. Patient will demonstrate left/right SLS >/= 15 seconds to demonstrate a reduced falls risk. Eval: Left SLS < 2 sec / Right SLS < 2 sec  3. Patient will demonstrate ability to ascend/descend 12 stairs with a reciprocal gait pattern and single UE assist independently to improve safety with community ADLs.   Eval: NT    PLAN  [x]  Upgrade activities as tolerated     [x]  Continue plan of care  []  Update interventions per flow sheet       []  Discharge due to:_  []  Other:_      Annalee Crowell PT 4/16/2021  7:43 AM    Future Appointments   Date Time Provider Amalia Concpecion   4/16/2021  9:30 AM Jana Be, PT [facility]   4/19/2021 11:00 AM Jana Be, [place] [facility]   4/21/2021 11:45 AM Jana Awe, PT [facility]   4/26/2021 10:15 AM Jana Awe, PT [facility]   4/28/2021 11:45 AM Jana Awe, PT [facility]   5/3/2021 10:15 AM Jana Awe, PT [facility]   5/5/2021  3:30 PM Jana Awe, PT [facility]

## 2021-04-19 ENCOUNTER — HOSPITAL ENCOUNTER (OUTPATIENT)
Dept: PHYSICAL THERAPY | Age: 81
Discharge: HOME | End: 2021-04-19
Payer: MEDICARE

## 2021-04-19 PROCEDURE — 97110 THERAPEUTIC EXERCISES: CPT

## 2021-04-19 PROCEDURE — 97112 NEUROMUSCULAR REEDUCATION: CPT

## 2021-04-19 NOTE — PROGRESS NOTES
PT DAILY TREATMENT NOTE     Patient Name: Nataliya Aviles  Date:2021  : 1940  [x]  Patient  Verified  [place] / Plan: [place] & B / Product Type: Medicare /    In time:1059  Out time:1154  Total Treatment Time (min): 55  Visit #: 4 of 8    Medicare/BCBS Only   Total Timed Codes (min):  45 1:1 Treatment Time:  45       Treatment Area: Right hip pain [M25.551]  Low back pain [M54.5]    SUBJECTIVE  Pain Level (0-10 scale): 1  Any medication changes, allergies to medications, adverse drug reactions, diagnosis change, or new procedure performed?: [x] No    [] Yes (see summary sheet for update)  Subjective functional status/changes:   [] No changes reported  Patient reports overall improvement since start of care with reduction in low back pain. Patient does continue to report right-sided low back pain with right leg weakness. OBJECTIVE         Modality rationale: decrease pain and increase tissue extensibility to improve the patients ability to improve ease with sleep   Min Type Additional Details     10 []??  Ice     [x]? ?  heat  []? ?  Ice massage Position: Supine  Location: Lumbar, Post-tx        20 min Therapeutic Exercise:  [x]? ? See flow sheet : Emphasis placed on improving available lumbopelvic and hip AROM and strength   Rationale: increase ROM and increase strength to improve the patients ability to improve ease with functional ADLs     25 min Neuromuscular Re-education:  [x]? ?  See flow sheet : Emphasis placed on improving activation and recruitment of the anterior abdominal and gluteal musculature and improving LE proprioceptive and kinesthetic awareness   Rationale: increase ROM, increase strength, improve balance and increase proprioception  to improve the patients ability to decrease falls risk.         With   [] TE   [] TA   [] neuro   [] other: Patient Education: [x] Review HEP    [] Progressed/Changed HEP based on:   [] positioning   [] body mechanics   [] transfers   [] heat/ice application    [] other:      Other Objective/Functional Measures:   Lumbar Assessment: Lumbar Right Sidebend (25% limited; Right lumbar pain), Increase in right lumbar pain with distraction/gapping (sidelying, supine hip distraction)     Pain Level (0-10 scale) post treatment: 0    ASSESSMENT/Changes in Function: Without AD patient continues to demonstrate a right antalgic gait pattern with diminished right single-limb stance stability noted during stance phase and with right weight shift. With poor eccentric control with stand-to-sit performance from low table without UE assistance. Patient will continue to benefit from skilled PT services to modify and progress therapeutic interventions, address functional mobility deficits, address ROM deficits, address strength deficits, analyze and address soft tissue restrictions, analyze and cue movement patterns, analyze and modify body mechanics/ergonomics, assess and modify postural abnormalities and address imbalance/dizziness to attain remaining goals. []  See Plan of Care  []  See progress note/recertification  []  See Discharge Summary         Progress towards goals / Updated goals:    Short Term Goals: To be accomplished in 2 weeks:  1. Patient will subjectively report full compliance with prescribed HEP. Eval: HEP provided  Current: Met, HEP performance reported as prescribed, 4/14/2021  2. Patient will demonstrate supine right hip flexion MMT 5/5 (non-fatigable) to improve ease with curb management. Eval: Supine Right Hip Flexion MMT = 4+/5 (non-fatigable)  Current: Remains, Supine Right Hip Flexion MMT = 4+/5 (non-fatigable), 4/19/2021  3. Patient will demonstrate left/right hip abduction MMT >/= 4/5 to improve safety with ambulation on uneven surfaces. Eval: Left Hip Abduction MMT 3/5, Right Hip Abduction MMT 2+/5     Long Term Goals: To be accomplished in 4 weeks:  1.  Patient will demonstrate a significant functional improvement as demonstrated by a score of >/= 50 on FOTO. Eval: FOTO = 42       2. Patient will demonstrate left/right SLS >/= 15 seconds to demonstrate a reduced falls risk. Eval: Left SLS < 2 sec / Right SLS < 2 sec  Current: Progressing, Left SLS 4 sec / Right SLS < 2 sec, 4/19/2021  3. Patient will demonstrate ability to ascend/descend 12 stairs with a reciprocal gait pattern and single UE assist independently to improve safety with community ADLs.   Eval: NT    PLAN  [x]  Upgrade activities as tolerated     [x]  Continue plan of care  []  Update interventions per flow sheet       []  Discharge due to:_  []  Other:_      Tomi Moreira PT 4/19/2021  6:59 AM    Future Appointments   Date Time Provider Amalia Concepcion   4/19/2021 11:00 AM Dylon Gonzales [facility]   4/21/2021 11:45 AM Mona De León PT [facility]   4/26/2021 10:15 AM Mona De León PT [facility]   4/28/2021 11:45 AM Mona De León PT [facility]   5/3/2021 10:15 AM Mona De León PT [facility]   5/5/2021  3:30 PM Mona De León PT [facility]

## 2021-04-21 ENCOUNTER — HOSPITAL ENCOUNTER (OUTPATIENT)
Dept: PHYSICAL THERAPY | Age: 81
Discharge: HOME | End: 2021-04-21
Payer: MEDICARE

## 2021-04-21 PROCEDURE — 97110 THERAPEUTIC EXERCISES: CPT

## 2021-04-21 PROCEDURE — 97112 NEUROMUSCULAR REEDUCATION: CPT

## 2021-04-21 NOTE — PROGRESS NOTES
PT DAILY TREATMENT NOTE     Patient Name: Magdalena Main  Date:2021  : 1940  [x]  Patient  Verified  [place] / Plan: [place] & B / Product Type: Medicare /    In time:1145  Out time:1243  Total Treatment Time (min): 58  Visit #: 5 of 8    Medicare/BCBS Only   Total Timed Codes (min):  48 1:1 Treatment Time:  40       Treatment Area: Right hip pain [M25.551]  Low back pain [M54.5]    SUBJECTIVE  Pain Level (0-10 scale): 0  Any medication changes, allergies to medications, adverse drug reactions, diagnosis change, or new procedure performed?: [x] No    [] Yes (see summary sheet for update)  Subjective functional status/changes:   [] No changes reported  Patient reports waking up with AM with increase in low back pain with patient questioning whether secondary to sleep posture. Patient reports secondary to pain having taking Tramadol this AM.     OBJECTIVE            Modality rationale: decrease pain and increase tissue extensibility to improve the patients ability to improve ease with sleep   Min Type Additional Details     10 []???  Ice     [x]? ??  heat  []? ??  Ice massage Position: Supine  Location: Lumbar, Post-tx        20 min Therapeutic Exercise:  [x]? ?? See flow sheet : Emphasis placed on improving available lumbopelvic and hip AROM and strength   Rationale: increase ROM and increase strength to improve the patients ability to improve ease with functional ADLs     28 min Neuromuscular Re-education:  [x]? ??  See flow sheet : Emphasis placed on improving activation and recruitment of the anterior abdominal and gluteal musculature and improving LE proprioceptive and kinesthetic awareness   Rationale: increase ROM, increase strength, improve balance and increase proprioception  to improve the patients ability to decrease falls risk.                                              With   [] TE   [] TA   [] neuro   [] other: Patient Education: [x] Review HEP    [] Progressed/Changed HEP based on:   [] positioning   [] body mechanics   [] transfers   [] heat/ice application    [] other:      Other Objective/Functional Measures:   Stairs x4 Steps - Reciprocal gait pattern with bilateral UE assist with ascent with left lateral weight shift with step-to gait pattern with bilateral UE assist with descent     Pain Level (0-10 scale) post treatment: 1    ASSESSMENT/Changes in Function: Observationally with slight improvement in right single-limb stance stability with closed-chain exercises. With assessment of stair management and sit-to-stand performance patient noted to unweight right LE with performance with difficulty obtaining symmetrical LE weightbearing despite verbal cuing to correct. Patient will continue to benefit from skilled PT services to modify and progress therapeutic interventions, address functional mobility deficits, address ROM deficits, address strength deficits, analyze and address soft tissue restrictions, analyze and cue movement patterns, analyze and modify body mechanics/ergonomics, assess and modify postural abnormalities, address imbalance/dizziness and instruct in home and community integration to attain remaining goals. []  See Plan of Care  []  See progress note/recertification  []  See Discharge Summary         Progress towards goals / Updated goals:    Short Term Goals: To be accomplished in 2 weeks:  1. Patient will subjectively report full compliance with prescribed HEP. Eval: HEP provided  Current: Met, HEP performance reported as prescribed, 4/14/2021  2. Patient will demonstrate supine right hip flexion MMT 5/5 (non-fatigable) to improve ease with curb management. Eval: Supine Right Hip Flexion MMT = 4+/5 (non-fatigable)  Current: Remains, Supine Right Hip Flexion MMT = 4+/5 (non-fatigable), 4/19/2021  3. Patient will demonstrate left/right hip abduction MMT >/= 4/5 to improve safety with ambulation on uneven surfaces.   Eval: Left Hip Abduction MMT 3/5, Right Hip Abduction MMT 2+/5     Long Term Goals: To be accomplished in 4 weeks:  1. Patient will demonstrate a significant functional improvement as demonstrated by a score of >/= 50 on FOTO. Eval: FOTO = 42       2. Patient will demonstrate left/right SLS >/= 15 seconds to demonstrate a reduced falls risk. Eval: Left SLS < 2 sec / Right SLS < 2 sec  Current: Progressing, Left SLS 4 sec / Right SLS < 2 sec, 4/19/2021  3. Patient will demonstrate ability to ascend/descend 12 stairs with a reciprocal gait pattern and single UE assist independently to improve safety with community ADLs.   Eval: NT  Current: Stairs x4 Steps - Reciprocal gait pattern with bilateral UE assist with ascent with left lateral weight shift with step-to gait pattern with bilateral UE assist with descent, 4/21/2021    PLAN  [x]  Upgrade activities as tolerated     [x]  Continue plan of care  []  Update interventions per flow sheet       []  Discharge due to:_  []  Other:_      Shaka Thomas PT 4/21/2021  8:32 AM    Future Appointments   Date Time Provider Amalia Concepcion   4/21/2021 11:45 AM Fadumo Kovacs [facility]   4/26/2021 10:15 AM Anita Rounds, PT [facility]   4/28/2021 11:45 AM Anita Rounds, PT [facility]   5/3/2021 10:15 AM [facility]   5/5/2021  3:30 PM Anita Rounds, PT [facility]

## 2021-04-26 ENCOUNTER — HOSPITAL ENCOUNTER (OUTPATIENT)
Dept: PHYSICAL THERAPY | Age: 81
Discharge: HOME | End: 2021-04-26
Payer: MEDICARE

## 2021-04-26 PROCEDURE — 97110 THERAPEUTIC EXERCISES: CPT

## 2021-04-26 PROCEDURE — 97112 NEUROMUSCULAR REEDUCATION: CPT

## 2021-04-26 NOTE — PROGRESS NOTES
PT DAILY TREATMENT NOTE     Patient Name: Laura Ruvalcaba  Date:2021  : 1940  [x]  Patient  Verified  [place] / Plan: [place] & B / Product Type: Medicare /    In time:1015  Out time:1116  Total Treatment Time (min): 61  Visit #: 6 of 8    Medicare/BCBS Only   Total Timed Codes (min):  51 1:1 Treatment Time:  48       Treatment Area: Right hip pain [M25.551]  Low back pain [M54.5]    SUBJECTIVE  Pain Level (0-10 scale): 1  Any medication changes, allergies to medications, adverse drug reactions, diagnosis change, or new procedure performed?: [x] No    [] Yes (see summary sheet for update)  Subjective functional status/changes:   [] No changes reported  Patient reports generalized fatigue after last treatment session. OBJECTIVE             Modality rationale: decrease pain and increase tissue extensibility to improve the patients ability to improve ease with sleep   Min Type Additional Details     10 []????  Ice     [x]? ???  heat  []????  Ice massage Position: Supine  Location: Lumbar, Post-tx        25 min Therapeutic Exercise:  [x]? ??? See flow sheet : Emphasis placed on improving available lumbopelvic and hip AROM and strength   Rationale: increase ROM and increase strength to improve the patients ability to improve ease with functional ADLs     26 min Neuromuscular Re-education:  [x]? ???  See flow sheet : Emphasis placed on improving activation and recruitment of the anterior abdominal and gluteal musculature and improving LE proprioceptive and kinesthetic awareness   Rationale: increase ROM, increase strength, improve balance and increase proprioception  to improve the patients ability to decrease falls risk.         With   [] TE   [] TA   [] neuro   [] other: Patient Education: [x] Review HEP    [] Progressed/Changed HEP based on:   [] positioning   [] body mechanics   [] transfers   [] heat/ice application    [] other:      Other Objective/Functional Measures: Left Hip Abduction MMT 3+/5, Right Hip Abduction MMT 3/5     Pain Level (0-10 scale) post treatment: 1    ASSESSMENT/Changes in Function: With greater self-balance confidence identified with completion of static and dynamic balance activities within clinic with reduced reliance on UE assistance with exercise performance. Patient with continued tendency to asymmetrically weightbear with diminished weight acceptance through the right LE secondary to perceived weakness and diminished stability. With continued prominent proximal right hip weakness objectively. Patient will continue to benefit from skilled PT services to modify and progress therapeutic interventions, address functional mobility deficits, address ROM deficits, address strength deficits, analyze and address soft tissue restrictions, analyze and cue movement patterns, analyze and modify body mechanics/ergonomics, assess and modify postural abnormalities, address imbalance/dizziness and instruct in home and community integration to attain remaining goals. []  See Plan of Care  []  See progress note/recertification  []  See Discharge Summary         Progress towards goals / Updated goals:    Short Term Goals: To be accomplished in 2 weeks:  1. Patient will subjectively report full compliance with prescribed HEP. Eval: HEP provided  Current: Met, HEP performance reported as prescribed, 4/14/2021  2. Patient will demonstrate supine right hip flexion MMT 5/5 (non-fatigable) to improve ease with curb management. Eval: Supine Right Hip Flexion MMT = 4+/5 (non-fatigable)  Current: Remains, Supine Right Hip Flexion MMT = 4+/5 (non-fatigable), 4/19/2021  3. Patient will demonstrate left/right hip abduction MMT >/= 4/5 to improve safety with ambulation on uneven surfaces.   Eval: Left Hip Abduction MMT 3/5, Right Hip Abduction MMT 2+/5  Current: Progressing, Left Hip Abduction MMT 3+/5, Right Hip Abduction MMT 3/5, 4/26/2021     Long Term Goals: To be accomplished in 4 weeks:  1. Patient will demonstrate a significant functional improvement as demonstrated by a score of >/= 50 on FOTO. Eval: FOTO = 42       2. Patient will demonstrate left/right SLS >/= 15 seconds to demonstrate a reduced falls risk. Eval: Left SLS < 2 sec / Right SLS < 2 sec  Current: Progressing, Left SLS 4 sec / Right SLS < 2 sec, 4/19/2021  3. Patient will demonstrate ability to ascend/descend 12 stairs with a reciprocal gait pattern and single UE assist independently to improve safety with community ADLs.   Eval: NT  Current: Stairs x4 Steps - Reciprocal gait pattern with bilateral UE assist with ascent with left lateral weight shift with step-to gait pattern with bilateral UE assist with descent, 4/21/2021    PLAN  [x]  Upgrade activities as tolerated     [x]  Continue plan of care  []  Update interventions per flow sheet       []  Discharge due to:_  []  Other:_      Annalee Crowell PT 4/26/2021  7:01 AM    Future Appointments   Date Time Provider Amalia Concepcion   4/26/2021 10:15 AM Jana Be, PT [facility]   4/28/2021 11:45 AM J[facility] Be, [facility]   5/3/2021 10:15 AM Jana Be, PT [facility]   5/5/2021  3:30 PM Jana Be, PT [facility]

## 2021-04-28 ENCOUNTER — APPOINTMENT (OUTPATIENT)
Dept: PHYSICAL THERAPY | Age: 81
End: 2021-04-28
Payer: MEDICARE

## 2021-05-03 ENCOUNTER — HOSPITAL ENCOUNTER (OUTPATIENT)
Dept: PHYSICAL THERAPY | Age: 81
Discharge: HOME | End: 2021-05-03
Payer: MEDICARE

## 2021-05-03 PROCEDURE — 97112 NEUROMUSCULAR REEDUCATION: CPT

## 2021-05-03 PROCEDURE — 97110 THERAPEUTIC EXERCISES: CPT

## 2021-05-03 NOTE — PROGRESS NOTES
PT DAILY TREATMENT NOTE     Patient Name: Juno Herrera  Date:5/3/2021  : 1940  [x]  Patient  Verified  [place] / Plan: [place] & B / Product Type: Medicare /    In time:1015  Out time:1110  Total Treatment Time (min): 55  Visit #: 7 of 8    Medicare/BCBS Only   Total Timed Codes (min):  45 1:1 Treatment Time:  45       Treatment Area: Right hip pain [M25.551]  Low back pain [M54.5]    SUBJECTIVE  Pain Level (0-10 scale): 0  Any medication changes, allergies to medications, adverse drug reactions, diagnosis change, or new procedure performed?: [x] No    [] Yes (see summary sheet for update)  Subjective functional status/changes:   [] No changes reported  Patient reports non-attendance to last treatment session secondary to removal of skin cancer from face with patient currently with stiches. OBJECTIVE             Modality rationale: decrease pain and increase tissue extensibility to improve the patients ability to improve ease with sleep   Min Type Additional Details     10 []?????  Ice     [x]?????  heat  []?????  Ice massage Position: Supine  Location: Lumbar, Post-tx        20 min Therapeutic Exercise:  [x]? ???? See flow sheet : Emphasis placed on improving available lumbopelvic and hip AROM and strength   Rationale: increase ROM and increase strength to improve the patients ability to improve ease with functional ADLs     25 min Neuromuscular Re-education:  [x]? ????  See flow sheet : Emphasis placed on improving activation and recruitment of the anterior abdominal and gluteal musculature and improving LE proprioceptive and kinesthetic awareness   Rationale: increase ROM, increase strength, improve balance and increase proprioception  to improve the patients ability to decrease falls risk.         With   [] TE   [] TA   [] neuro   [] other: Patient Education: [x] Review HEP    [] Progressed/Changed HEP based on:   [] positioning   [] body mechanics   [] transfers   [] heat/ice application    [] other:      Other Objective/Functional Measures: See goals below. Pain Level (0-10 scale) post treatment: 4    ASSESSMENT/Changes in Function: Objectively since start of care patient has demonstrated a significant improvement in proximal right hip strength and weightbearing stability but continues to demonstrate a prominent trendelenberg gait pattern with ambulation without assistive device with patient at continued increased falls risk. Patient observationally noted to demonstrate greater self-balance confidence but with continued reduced weightbearing acceptance through the right LE. Despite overall subjective and objective improvement since start of care clinician with continued concern regarding presence of non-painful proximal right LE weakness. Patient will continue to benefit from skilled PT services to modify and progress therapeutic interventions, address functional mobility deficits, address ROM deficits, address strength deficits, analyze and address soft tissue restrictions, analyze and cue movement patterns, analyze and modify body mechanics/ergonomics, assess and modify postural abnormalities, address imbalance/dizziness and instruct in home and community integration to attain remaining goals. []  See Plan of Care  [x]  See progress note/recertification  []  See Discharge Summary         Progress towards goals / Updated goals:    Short Term Goals: To be accomplished in 2 weeks:  1. Patient will subjectively report full compliance with prescribed HEP. Eval: HEP provided  Current: Met, HEP performance reported as prescribed, 4/14/2021  2. Patient will demonstrate supine right hip flexion MMT 5/5 (non-fatigable) to improve ease with curb management. Eval: Supine Right Hip Flexion MMT = 4+/5 (non-fatigable)  Current: Remains, Supine Right Hip Flexion MMT = 4+/5 (non-fatigable), 4/19/2021  3.  Patient will demonstrate left/right hip abduction MMT >/= 4/5 to improve safety with ambulation on uneven surfaces. Eval: Left Hip Abduction MMT 3/5, Right Hip Abduction MMT 2+/5  Current: Progressing, Left Hip Abduction MMT 3+/5, Right Hip Abduction MMT 3/5, 4/26/2021     Long Term Goals: To be accomplished in 4 weeks:  1. Patient will demonstrate a significant functional improvement as demonstrated by a score of >/= 50 on FOTO. Eval: FOTO = 42       Current: Remains, FOTO = 43, 5/3/2021  2. Patient will demonstrate left/right SLS >/= 15 seconds to demonstrate a reduced falls risk. Eval: Left SLS < 2 sec / Right SLS < 2 sec  Current: Progressing, Left SLS 4 sec / Right SLS < 2 sec, 4/19/2021  3. Patient will demonstrate ability to ascend/descend 12 stairs with a reciprocal gait pattern and single UE assist independently to improve safety with community ADLs.   Eval: NT  Current: Progressing, Stairs x4 Steps - Reciprocal gait pattern with bilateral UE assist with ascent with left lateral weight shift with step-to gait pattern with bilateral UE assist with descent, 4/21/2021    PLAN  []  Upgrade activities as tolerated     []  Continue plan of care  []  Update interventions per flow sheet       []  Discharge due to:_  []  Other:_      Singh Marie PT 5/3/2021  7:01 AM    Future Appointments   Date Time Provider Amalia Concepcion   5/3/2021 10:15 AM Harper Kovacs [address] [facility]   5/5/2021  3:30 PM Radha Kovacs[address] [facility]   5/10/2021  8:45 AM Joselin Rutherford, PT [facility]   5/14/2021  1:15 PM Joselin Rutherford, PT [facility]   5/18/2021 11:45 AM Joselin Rutherford, PT [facility]   5/20/2021 10:15 AM Amairani Kovacs, PT [facility]

## 2021-05-03 NOTE — PROGRESS NOTES
[facility]              [address]        [place], [address] [place]   [phone] fax    Continued Plan of Care/ Re-certification for Physical Therapy Services    Patient name: Bárbara Vela Start of Care: 2021   Referral source: Urban Renteria MD : 1940   Medical/Treatment Diagnosis: Right hip pain [M25.551]  Low back pain [M54.5]  Payor: Violeta Mitchell / Plan: [place] & B / Product Type: Medicare /  Onset Date:Chronic, Worsening 2021     Prior Hospitalization: see medical history Provider#: [id number]   Medications: Verified on Patient Summary List    Comorbidities: Former Smoker, Arthritis, HTN, Anxiety, Carotid Artery Stenosis, Pre-Diabetic, Osteoporosis   Prior Level of Function: Fall History, (I) Self-Care ADLs, (I) Functional ADLs, Ambulation without AD, (I) Driving  Visits from Start of Care: 7    Missed Visits: 1    The Plan of Care and following information is based on the patient's current status:    Short Term Goals: To be accomplished in 2 weeks:  1. Patient will subjectively report full compliance with prescribed HEP. Eval: HEP provided  Current: Met, HEP performance reported as prescribed  2. Patient will demonstrate supine right hip flexion MMT 5/5 (non-fatigable) to improve ease with curb management. Eval: Supine Right Hip Flexion MMT = 4+/5 (non-fatigable)  Current: Remains, Supine Right Hip Flexion MMT = 4+/5 (non-fatigable)  3. Patient will demonstrate left/right hip abduction MMT >/= 4/5 to improve safety with ambulation on uneven surfaces. Eval: Left Hip Abduction MMT 3/5, Right Hip Abduction MMT 2+/5  Current: Progressing, Left Hip Abduction MMT 3+/5, Right Hip Abduction MMT 3/5     Long Term Goals: To be accomplished in 4 weeks:  1. Patient will demonstrate a significant functional improvement as demonstrated by a score of >/= 50 on FOTO. Eval: FOTO = 42       Current: Remains, FOTO = 43  2.  Patient will demonstrate left/right SLS >/= 15 seconds to demonstrate a reduced falls risk. Eval: Left SLS < 2 sec / Right SLS < 2 sec  Current: Progressing, Left SLS 4 sec / Right SLS < 2 sec  3. Patient will demonstrate ability to ascend/descend 12 stairs with a reciprocal gait pattern and single UE assist independently to improve safety with community ADLs. Eval: NT  Current: Stairs x4 Steps - Reciprocal gait pattern with bilateral UE assist with ascent with left lateral weight shift with step-to gait pattern with bilateral UE assist with descent    Key functional changes: See goals above. Problems/ barriers to goal attainment: Continued increased falls risk. Problem List: pain affecting function, decrease ROM, decrease strength, impaired gait/ balance, decrease ADL/ functional abilitiies, decrease activity tolerance, decrease flexibility/ joint mobility and decrease transfer abilities    Treatment Plan: Therapeutic exercise, Therapeutic activities, Neuromuscular re-education, Physical agent/modality, Gait/balance training, Manual therapy, Patient education, Self Care training, Functional mobility training, Home safety training and Stair training     Patient Goal (s) has been updated and includes: \"Have better balance. Goals for this certification period to be accomplished in 4 weeks: Continue with unmet goals above. Frequency / Duration: Patient to be seen 2 times per week for 4 weeks:    Assessment / Recommendations:    Objectively since start of care patient has demonstrated a significant improvement in proximal right hip strength and weightbearing stability but continues to demonstrate a prominent trendelenberg gait pattern with ambulation without assistive device with patient at continued increased falls risk. Patient observationally noted to demonstrate greater self-balance confidence but with continued reduced weightbearing acceptance through the right LE.  Despite overall subjective and objective improvement since start of care clinician with continued concern regarding presence of non-painful proximal right LE weakness.      Patient will continue to benefit from skilled PT services to modify and progress therapeutic interventions, address functional mobility deficits, address ROM deficits, address strength deficits, analyze and address soft tissue restrictions, analyze and cue movement patterns, analyze and modify body mechanics/ergonomics, assess and modify postural abnormalities, address imbalance/dizziness and instruct in home and community integration to attain remaining goals. Certification Period: 5/5/2021 - 6/3/2021    Justine Vilchis, PT 5/3/2021 7:02 AM    ________________________________________________________________________  I certify that the above Therapy Services are being furnished while the patient is under my care. I agree with the treatment plan and certify that this therapy is necessary. [] I have read the above and request that my patient continue as recommended.   [] I have read the above report and request that my patient continue therapy with the following changes/special instructions: _______________________________________  [] I have read the above report and request that my patient be discharged from therapy    Physician's Signature:____________Date:_________TIME:________     Manda Cam MD  ** Signature, Date and Time must be completed for valid certification **  Please sign and return to [facility] - [facility]              [address], [address] [place]   [phone] fax

## 2021-05-05 ENCOUNTER — HOSPITAL ENCOUNTER (OUTPATIENT)
Dept: PHYSICAL THERAPY | Age: 81
Discharge: HOME | End: 2021-05-05
Payer: MEDICARE

## 2021-05-05 PROCEDURE — 97112 NEUROMUSCULAR REEDUCATION: CPT

## 2021-05-05 PROCEDURE — 97110 THERAPEUTIC EXERCISES: CPT

## 2021-05-05 NOTE — PROGRESS NOTES
PT DAILY TREATMENT NOTE     Patient Name: Magdalena Main  Date:2021  : 1940  [x]  Patient  Verified  [place] / Plan: [place] & B / Product Type: Medicare /    In time:331  Out time:439  Total Treatment Time (min): 76  Visit #: 1 of 8    Medicare/BCBS Only   Total Timed Codes (min):  58 1:1 Treatment Time:  62       Treatment Area: Right hip pain [M25.551]  Low back pain [M54.5]    SUBJECTIVE  Pain Level (0-10 scale): 3  Any medication changes, allergies to medications, adverse drug reactions, diagnosis change, or new procedure performed?: [x] No    [] Yes (see summary sheet for update)  Subjective functional status/changes:   [] No changes reported  Patient reports follow up with MD with recommended continuation of physical therapy services.     OBJECTIVE             Modality rationale: decrease pain and increase tissue extensibility to improve the patients ability to improve ease with sleep   Min Type Additional Details     10 []??????  Ice     [x]??????  heat  []??????  Ice massage Position: Supine  Location: Lumbar, Post-tx        24 min Therapeutic Exercise:  [x]?????? See flow sheet : Emphasis placed on improving available lumbopelvic and hip AROM and strength   Rationale: increase ROM and increase strength to improve the patients ability to improve ease with functional ADLs     34 min Neuromuscular Re-education:  [x]??????  See flow sheet : Emphasis placed on improving activation and recruitment of the anterior abdominal and gluteal musculature and improving LE proprioceptive and kinesthetic awareness   Rationale: increase ROM, increase strength, improve balance and increase proprioception  to improve the patients ability to decrease falls risk.        With   [] TE   [] TA   [] neuro   [] other: Patient Education: [x] Review HEP    [] Progressed/Changed HEP based on:   [] positioning   [] body mechanics   [] transfers   [] heat/ice application    [] other:      Other Objective/Functional Measures:   Supine Right Hip Flexion MMT = 5/5 (non-fatigable)     Pain Level (0-10 scale) post treatment: 4    ASSESSMENT/Changes in Function: With improved self-balance confidence demonstrated but with continued diminished right proximal hip weakness objectively. With further progression of weightbearing exercises to improve static and dynamic stability and reduce falls risk. Patient will continue to benefit from skilled PT services to modify and progress therapeutic interventions, address functional mobility deficits, address ROM deficits, address strength deficits, analyze and address soft tissue restrictions, analyze and cue movement patterns, analyze and modify body mechanics/ergonomics, assess and modify postural abnormalities, address imbalance/dizziness and instruct in home and community integration to attain remaining goals. []  See Plan of Care  []  See progress note/recertification  []  See Discharge Summary         Progress towards goals / Updated goals:     Short Term Goals: To be accomplished in 2 weeks:  1. Patient will subjectively report full compliance with prescribed HEP. At PN: Met, HEP performance reported as prescribed, 4/14/2021  2. Patient will demonstrate supine right hip flexion MMT 5/5 (non-fatigable) to improve ease with curb management. At PN: Remains, Supine Right Hip Flexion MMT = 4+/5 (non-fatigable), 4/19/2021  Current: Met, Supine Right Hip Flexion MMT = 5/5 (non-fatigable), 5/5/2021  3. Patient will demonstrate left/right hip abduction MMT >/= 4/5 to improve safety with ambulation on uneven surfaces. At PN: Progressing, Left Hip Abduction MMT 3+/5, Right Hip Abduction MMT 3/5, 4/26/2021     Long Term Goals: To be accomplished in 4 weeks:  1. Patient will demonstrate a significant functional improvement as demonstrated by a score of >/= 50 on FOTO. At PN: Remains, FOTO = 43, 5/3/2021  2.  Patient will demonstrate left/right SLS >/= 15 seconds to demonstrate a reduced falls risk. At PN: Progressing, Left SLS 4 sec / Right SLS < 2 sec, 4/19/2021  3. Patient will demonstrate ability to ascend/descend 12 stairs with a reciprocal gait pattern and single UE assist independently to improve safety with community ADLs.   At PN: Progressing, Stairs x4 Steps - Reciprocal gait pattern with bilateral UE assist with ascent with left lateral weight shift with step-to gait pattern with bilateral UE assist with descent, 4/21/2021    PLAN  [x]  Upgrade activities as tolerated     [x]  Continue plan of care  []  Update interventions per flow sheet       []  Discharge due to:_  []  Other:_      Rashard Jones, PT 5/5/2021  8:35 AM    Future Appointments   Date Time Provider Amalia Concepcion   5/5/2021  3:30 PM Dylon Gustafson [facility]   5/10/2021  8:45 AM Carlos Townsend, PT [facility]   5/14/2021  1:15 PM Carlos Townsend PT [facility]   5/18/2021 11:45 AM Carlos Townsend, PT [facility]   5/20/2021 10:15 AM Do Kovacs, PT [facility]

## 2021-05-10 ENCOUNTER — HOSPITAL ENCOUNTER (OUTPATIENT)
Dept: PHYSICAL THERAPY | Age: 81
Discharge: HOME | End: 2021-05-10
Payer: MEDICARE

## 2021-05-10 PROCEDURE — 97110 THERAPEUTIC EXERCISES: CPT

## 2021-05-10 PROCEDURE — 97112 NEUROMUSCULAR REEDUCATION: CPT

## 2021-05-10 NOTE — PROGRESS NOTES
PT DAILY TREATMENT NOTE     Patient Name: Rupert Garibay  Date:5/10/2021  : 1940  [x]  Patient  Verified  [place] / Plan: [place] & B / Product Type: Medicare /    In time:849  Out time:948  Total Treatment Time (min): 59  Visit #: 2 of 8    Medicare/BCBS Only   Total Timed Codes (min):  49 1:1 Treatment Time:  38       Treatment Area: Right hip pain [M25.551]  Low back pain [M54.5]    SUBJECTIVE  Pain Level (0-10 scale): 1  Any medication changes, allergies to medications, adverse drug reactions, diagnosis change, or new procedure performed?: [x] No    [] Yes (see summary sheet for update)  Subjective functional status/changes:   [] No changes reported  Patient reports that her pain is at the best in the AM. Patient reports no adverse response to last treatment session.      OBJECTIVE             Modality rationale: decrease pain and increase tissue extensibility to improve the patients ability to improve ease with sleep   Min Type Additional Details     10 []???????  Ice     [x]???????  heat  []???????  Ice massage Position: Supine  Location: Lumbar, Post-tx        24 min Therapeutic Exercise:  [x]??????? See flow sheet : Emphasis placed on improving available lumbopelvic and hip AROM and strength   Rationale: increase ROM and increase strength to improve the patients ability to improve ease with functional ADLs     25 min Neuromuscular Re-education:  [x]???????  See flow sheet : Emphasis placed on improving activation and recruitment of the anterior abdominal and gluteal musculature and improving LE proprioceptive and kinesthetic awareness   Rationale: increase ROM, increase strength, improve balance and increase proprioception  to improve the patients ability to decrease falls risk.         With   [] TE   [] TA   [] neuro   [] other: Patient Education: [x] Review HEP    [] Progressed/Changed HEP based on:   [] positioning   [] body mechanics   [] transfers   [] heat/ice application    [] other:      Other Objective/Functional Measures:   Left SLS 8 sec / Right SLS 6 sec     Pain Level (0-10 scale) post treatment: 1    ASSESSMENT/Changes in Function: With ability to progress static and dynamic balance exercises with good tolerance but noted fatigue upon repetition on the right LE with patient benefiting from verbal cuing to increase right foot clearance with elevation. With performance of  Bilateral squat on unstable surface patient noted to weight bear primarily through forefoot. Patient will continue to benefit from skilled PT services to modify and progress therapeutic interventions, address functional mobility deficits, address ROM deficits, address strength deficits, analyze and address soft tissue restrictions, analyze and cue movement patterns, analyze and modify body mechanics/ergonomics, assess and modify postural abnormalities, address imbalance/dizziness and instruct in home and community integration to attain remaining goals. []  See Plan of Care  []  See progress note/recertification  []  See Discharge Summary         Progress towards goals / Updated goals:    Short Term Goals: To be accomplished in 2 weeks:  1. Patient will subjectively report full compliance with prescribed HEP. At PN: Met, HEP performance reported as prescribed, 4/14/2021  2. Patient will demonstrate supine right hip flexion MMT 5/5 (non-fatigable) to improve ease with curb management. At PN: Remains, Supine Right Hip Flexion MMT = 4+/5 (non-fatigable), 4/19/2021  Current: Met, Supine Right Hip Flexion MMT = 5/5 (non-fatigable), 5/5/2021  3. Patient will demonstrate left/right hip abduction MMT >/= 4/5 to improve safety with ambulation on uneven surfaces. At PN: Progressing, Left Hip Abduction MMT 3+/5, Right Hip Abduction MMT 3/5, 4/26/2021     Long Term Goals: To be accomplished in 4 weeks:  1.  Patient will demonstrate a significant functional improvement as demonstrated by a score of >/= 50 on FOTO. At PN: Remains, FOTO = 43, 5/3/2021  2. Patient will demonstrate left/right SLS >/= 15 seconds to demonstrate a reduced falls risk. At PN: Progressing, Left SLS 4 sec / Right SLS < 2 sec, 4/19/2021  Current: Progressing, Left SLS 8 sec / Right SLS 6 sec, 5/10/2021  3. Patient will demonstrate ability to ascend/descend 12 stairs with a reciprocal gait pattern and single UE assist independently to improve safety with community ADLs.   At PN: Progressing, Stairs x4 Steps - Reciprocal gait pattern with bilateral UE assist with ascent with left lateral weight shift with step-to gait pattern with bilateral UE assist with descent, 4/21/2021       PLAN  [x]  Upgrade activities as tolerated     [x]  Continue plan of care  []  Update interventions per flow sheet       []  Discharge due to:_  []  Other:_      Sal Gregory, PT 5/10/2021  6:57 AM    Future Appointments   Date Time Provider Amalia Concepcion   5/10/2021  8:45 AM Promise Nunn, PT [facility]   5/14/2021  1:15 PM Dylon Vasquez [facility]   5/18/2021 11:45 AM Promise Nunn PT [facility]   5/20/2021 10:15 AM Favio Kovacs, PT [facility]

## 2021-05-14 ENCOUNTER — HOSPITAL ENCOUNTER (OUTPATIENT)
Dept: PHYSICAL THERAPY | Age: 81
Discharge: HOME | End: 2021-05-14
Payer: MEDICARE

## 2021-05-14 PROCEDURE — 97112 NEUROMUSCULAR REEDUCATION: CPT

## 2021-05-14 PROCEDURE — 97110 THERAPEUTIC EXERCISES: CPT

## 2021-05-14 NOTE — PROGRESS NOTES
PT DAILY TREATMENT NOTE     Patient Name: Leah Jarvis  Date:2021  : 1940  [x]  Patient  Verified  Payor: Amanda Engle / Plan: [place] & B / Product Type: Medicare /    In time:115  Out time:219  Total Treatment Time (min): 64  Visit #: 3 of 8    Medicare/BCBS Only   Total Timed Codes (min):  54 1:1 Treatment Time:  45       Treatment Area: Right hip pain [M25.551]  Low back pain [M54.5]    SUBJECTIVE  Pain Level (0-10 scale): 2  Any medication changes, allergies to medications, adverse drug reactions, diagnosis change, or new procedure performed?: [x] No    [] Yes (see summary sheet for update)  Subjective functional status/changes:   [] No changes reported  Patient reports noting that back pain progresses throughout the day and appears to be primarily activity-related with patient reporting in AM usually waking with minimal to no pain.      OBJECTIVE           Modality rationale: decrease pain and increase tissue extensibility to improve the patients ability to improve ease with sleep   Min Type Additional Details     10 []????????  Ice     [x]????????  heat  []????????  Ice massage Position: Supine  Location: Lumbar, Post-tx        24 min Therapeutic Exercise:  [x]???????? See flow sheet : Emphasis placed on improving available lumbopelvic and hip AROM and strength   Rationale: increase ROM and increase strength to improve the patients ability to improve ease with functional ADLs     30 min Neuromuscular Re-education:  [x]????????  See flow sheet : Emphasis placed on improving activation and recruitment of the anterior abdominal and gluteal musculature and improving LE proprioceptive and kinesthetic awareness   Rationale: increase ROM, increase strength, improve balance and increase proprioception  to improve the patients ability to decrease falls risk.         With   [] TE   [] TA   [] neuro   [] other: Patient Education: [x] Review HEP    [] Progressed/Changed HEP based on: [] positioning   [] body mechanics   [] transfers   [] heat/ice application    [] other:      Other Objective/Functional Measures:   Left SLS 8 sec / Right SLS 6 sec     Pain Level (0-10 scale) post treatment: 1    ASSESSMENT/Changes in Function: With overall objective improvement in LE strength and stability but continued lower back pain, without significant objective improvement since start of care with symptoms continuing to be primarily activity-dependent. With progression of functional trunk stability exercises to improve ease with functional lifting with patient reporting limitations with ADL performance. Patient will continue to benefit from skilled PT services to modify and progress therapeutic interventions, address functional mobility deficits, address ROM deficits, address strength deficits, analyze and address soft tissue restrictions, analyze and cue movement patterns, analyze and modify body mechanics/ergonomics, assess and modify postural abnormalities, address imbalance/dizziness and instruct in home and community integration to attain remaining goals. []  See Plan of Care  []  See progress note/recertification  []  See Discharge Summary         Progress towards goals / Updated goals:    Short Term Goals: To be accomplished in 2 weeks:  1. Patient will subjectively report full compliance with prescribed HEP. At PN: Met, HEP performance reported as prescribed, 4/14/2021  2. Patient will demonstrate supine right hip flexion MMT 5/5 (non-fatigable) to improve ease with curb management. At PN: Remains, Supine Right Hip Flexion MMT = 4+/5 (non-fatigable), 4/19/2021  Current: Met, Supine Right Hip Flexion MMT = 5/5 (non-fatigable), 5/5/2021  3. Patient will demonstrate left/right hip abduction MMT >/= 4/5 to improve safety with ambulation on uneven surfaces.   At PN: Progressing, Left Hip Abduction MMT 3+/5, Right Hip Abduction MMT 3/5, 4/26/2021     Long Term Goals: To be accomplished in 4 weeks:  1. Patient will demonstrate a significant functional improvement as demonstrated by a score of >/= 50 on FOTO. At PN: Remains, FOTO = 43, 5/3/2021  2. Patient will demonstrate left/right SLS >/= 15 seconds to demonstrate a reduced falls risk. At PN: Progressing, Left SLS 4 sec / Right SLS < 2 sec, 4/19/2021  Current: Progressing, Left SLS 8 sec / Right SLS 6 sec, 5/14/2021  3. Patient will demonstrate ability to ascend/descend 12 stairs with a reciprocal gait pattern and single UE assist independently to improve safety with community ADLs.   At PN: Progressing, Stairs x4 Steps - Reciprocal gait pattern with bilateral UE assist with ascent with left lateral weight shift with step-to gait pattern with bilateral UE assist with descent, 4/21/2021    PLAN  [x]  Upgrade activities as tolerated     [x]  Continue plan of care  []  Update interventions per flow sheet       []  Discharge due to:_  []  Other:_      Bernerd Hammans, PT 5/14/2021  7:47 AM    Future Appointments   Date Time Provider Amalia Concepcion   5/14/2021  1:15 PM Ephraim Scales, PT [facility]   5/18/2021 11:45 AM Ephraim Scales, PT [facility]   5/20/2021 10:15 AM Ephraim Scales, PT [facility]

## 2021-05-18 ENCOUNTER — HOSPITAL ENCOUNTER (OUTPATIENT)
Dept: PHYSICAL THERAPY | Age: 81
Discharge: HOME | End: 2021-05-18
Payer: MEDICARE

## 2021-05-18 PROCEDURE — 97112 NEUROMUSCULAR REEDUCATION: CPT

## 2021-05-18 PROCEDURE — 97110 THERAPEUTIC EXERCISES: CPT

## 2021-05-18 NOTE — PROGRESS NOTES
PT DAILY TREATMENT NOTE     Patient Name: Nickie Ogden  Date:2021  : 1940  [x]  Patient  Verified  [place] / Plan: [place] & B / Product Type: Medicare /    In JCSP:[id number]  Out time:1241  Total Treatment Time (min): 48  Visit #: 4 of 8    Medicare/BCBS Only   Total Timed Codes (min):  38 1:1 Treatment Time:  38       Treatment Area: Right hip pain [M25.551]  Low back pain [M54.5]    SUBJECTIVE  Pain Level (0-10 scale): 0  Any medication changes, allergies to medications, adverse drug reactions, diagnosis change, or new procedure performed?: [x] No    [] Yes (see summary sheet for update)  Subjective functional status/changes:   [] No changes reported  Patient reports feeling off-balanced but denies pain.      OBJECTIVE             Modality rationale: decrease pain and increase tissue extensibility to improve the patients ability to improve ease with sleep   Min Type Additional Details     10 []?????????  Ice     [x]?????????  heat  []?????????  Ice massage Position: Supine  Location: Lumbar, Post-tx        12 min Therapeutic Exercise:  [x]????????? See flow sheet : Emphasis placed on improving available lumbopelvic and hip AROM and strength   Rationale: increase ROM and increase strength to improve the patients ability to improve ease with functional ADLs     26 min Neuromuscular Re-education:  [x]?????????  See flow sheet : Emphasis placed on improving activation and recruitment of the anterior abdominal and gluteal musculature and improving LE proprioceptive and kinesthetic awareness   Rationale: increase ROM, increase strength, improve balance and increase proprioception  to improve the patients ability to decrease falls risk.         With   [] TE   [] TA   [] neuro   [] other: Patient Education: [x] Review HEP    [] Progressed/Changed HEP based on:   [] positioning   [] body mechanics   [] transfers   [] heat/ice application    [] other:      Other Objective/Functional Measures:   Stairs x12 Steps - Reciprocal gait pattern with single UE assist with ascent/descent     Pain Level (0-10 scale) post treatment: 0    ASSESSMENT/Changes in Function: Significant observational improvement in ease and safety with stair management with a reciprocal gait pattern independently with reduction in trendelenberg gait pattern observed with stair management. Further progression of rotational and diagonal trunk strengthening to improve tolerance with performance of functional household lifting. Patient will continue to benefit from skilled PT services to modify and progress therapeutic interventions, address functional mobility deficits, address ROM deficits, address strength deficits, analyze and address soft tissue restrictions, analyze and cue movement patterns, analyze and modify body mechanics/ergonomics, assess and modify postural abnormalities, address imbalance/dizziness and instruct in home and community integration to attain remaining goals. []  See Plan of Care  []  See progress note/recertification  []  See Discharge Summary         Progress towards goals / Updated goals:    Short Term Goals: To be accomplished in 2 weeks:  1. Patient will subjectively report full compliance with prescribed HEP. At PN: Met, HEP performance reported as prescribed, 4/14/2021  2. Patient will demonstrate supine right hip flexion MMT 5/5 (non-fatigable) to improve ease with curb management. At PN: Remains, Supine Right Hip Flexion MMT = 4+/5 (non-fatigable), 4/19/2021  Current: Met, Supine Right Hip Flexion MMT = 5/5 (non-fatigable), 5/5/2021  3. Patient will demonstrate left/right hip abduction MMT >/= 4/5 to improve safety with ambulation on uneven surfaces. At PN: Progressing, Left Hip Abduction MMT 3+/5, Right Hip Abduction MMT 3/5, 4/26/2021     Long Term Goals: To be accomplished in 4 weeks:  1.  Patient will demonstrate a significant functional improvement as demonstrated by a score of >/= 50 on FOTO. At PN: Remains, FOTO = 43, 5/3/2021  2. Patient will demonstrate left/right SLS >/= 15 seconds to demonstrate a reduced falls risk. At PN: Progressing, Left SLS 4 sec / Right SLS < 2 sec, 4/19/2021  Current: Progressing, Left SLS 8 sec / Right SLS 6 sec, 5/14/2021  3. Patient will demonstrate ability to ascend/descend 12 stairs with a reciprocal gait pattern and single UE assist independently to improve safety with community ADLs.   At PN: Progressing, Stairs x4 Steps - Reciprocal gait pattern with bilateral UE assist with ascent with left lateral weight shift with step-to gait pattern with bilateral UE assist with descent, 4/21/2021  Current: Progressing, Stairs x12 Steps - Reciprocal gait pattern with single UE assist with ascent/descent, 5/18/2021       PLAN  [x]  Upgrade activities as tolerated     [x]  Continue plan of care  []  Update interventions per flow sheet       []  Discharge due to:_  []  Other:_      Amanda Hernandez PT 5/18/2021  6:59 AM    Future Appointments   Date Time Provider Amalia Concepcion   5/18/2021 11:45 AM Monika Farley, PT [facility]   5/20/2021 10:15 AM Monika Farley, PT [facility]   5/26/2021  8:45 AM Monika Farley, PT [facility]   6/2/2021  3:30 PM Monika Farley, PT [facility]

## 2021-05-20 ENCOUNTER — HOSPITAL ENCOUNTER (OUTPATIENT)
Dept: PHYSICAL THERAPY | Age: 81
Discharge: HOME | End: 2021-05-20
Payer: MEDICARE

## 2021-05-20 PROCEDURE — 97112 NEUROMUSCULAR REEDUCATION: CPT

## 2021-05-20 PROCEDURE — 97110 THERAPEUTIC EXERCISES: CPT

## 2021-05-20 NOTE — PROGRESS NOTES
PT DAILY TREATMENT NOTE     Patient Name: Boogie Steiner  Date:2021  : 1940  [x]  Patient  Verified  [place] / Plan: [place] & B / Product Type: Medicare /    In time:1020  Out time:1115  Total Treatment Time (min): 55  Visit #: 5 of 8    Medicare/BCBS Only   Total Timed Codes (min):  45 1:1 Treatment Time:  45       Treatment Area: Right hip pain [M25.551]  Low back pain [M54.5]    SUBJECTIVE  Pain Level (0-10 scale): 0  Any medication changes, allergies to medications, adverse drug reactions, diagnosis change, or new procedure performed?: [x] No    [] Yes (see summary sheet for update)  Subjective functional status/changes:   [] No changes reported  Patient reports plan to receive right foot injection 2021. Patient reports no adverse response to last treatment session.     OBJECTIVE            Modality rationale: decrease pain and increase tissue extensibility to improve the patients ability to improve ease with sleep   Min Type Additional Details     10 []??????????  Ice     [x]??????????  heat  []??????????  Ice massage Position: Supine  Location: Lumbar, Post-tx        20 min Therapeutic Exercise:  [x]?????????? See flow sheet : Emphasis placed on improving available lumbopelvic and hip AROM and strength   Rationale: increase ROM and increase strength to improve the patients ability to improve ease with functional ADLs     25 min Neuromuscular Re-education:  [x]??????????  See flow sheet : Emphasis placed on improving activation and recruitment of the anterior abdominal and gluteal musculature and improving LE proprioceptive and kinesthetic awareness   Rationale: increase ROM, increase strength, improve balance and increase proprioception  to improve the patients ability to decrease falls risk.         With   [] TE   [] TA   [] neuro   [] other: Patient Education: [x] Review HEP    [] Progressed/Changed HEP based on:   [] positioning   [] body mechanics   [] transfers   [] heat/ice application    [] other:      Other Objective/Functional Measures:   Left Hip Abduction MMT 3+/5, Right Hip Abduction MMT 3/5     Pain Level (0-10 scale) post treatment: 1    ASSESSMENT/Changes in Function: With overall objective improvement in available right proximal hip strength but with continued fatigable weakness noted with repetition of exercise. With performance of squat on airex with unweighting of the right LE noted with bilateral anterior weightshift with loss of balance with cuing to posteriorly shift weight. Patient will continue to benefit from skilled PT services to modify and progress therapeutic interventions, address functional mobility deficits, address ROM deficits, address strength deficits, analyze and address soft tissue restrictions, analyze and cue movement patterns, analyze and modify body mechanics/ergonomics, assess and modify postural abnormalities, address imbalance/dizziness and instruct in home and community integration to attain remaining goals. []  See Plan of Care  []  See progress note/recertification  []  See Discharge Summary         Progress towards goals / Updated goals:    Short Term Goals: To be accomplished in 2 weeks:  1. Patient will subjectively report full compliance with prescribed HEP. At Last PN: Met, HEP performance reported as prescribed, 4/14/2021  2. Patient will demonstrate supine right hip flexion MMT 5/5 (non-fatigable) to improve ease with curb management. At Last PN: Remains, Supine Right Hip Flexion MMT = 4+/5 (non-fatigable), 4/19/2021  Current: Met, Supine Right Hip Flexion MMT = 5/5 (non-fatigable), 5/5/2021  3. Patient will demonstrate left/right hip abduction MMT >/= 4/5 to improve safety with ambulation on uneven surfaces.   At Last PN: Progressing, Left Hip Abduction MMT 3+/5, Right Hip Abduction MMT 3/5, 4/26/2021  Current: Remains, Left Hip Abduction MMT 3+/5, Right Hip Abduction MMT 3/5, 5/20/2021     Long Term Goals: To be accomplished in 4 weeks:  1. Patient will demonstrate a significant functional improvement as demonstrated by a score of >/= 50 on FOTO. At Last PN: Remains, FOTO = 43, 5/3/2021  2. Patient will demonstrate left/right SLS >/= 15 seconds to demonstrate a reduced falls risk. At Last PN: Progressing, Left SLS 4 sec / Right SLS < 2 sec, 4/19/2021  Current: Progressing, Left SLS 8 sec / Right SLS 6 sec, 5/14/2021  3. Patient will demonstrate ability to ascend/descend 12 stairs with a reciprocal gait pattern and single UE assist independently to improve safety with community ADLs.   At Last PN: Progressing, Stairs x4 Steps - Reciprocal gait pattern with bilateral UE assist with ascent with left lateral weight shift with step-to gait pattern with bilateral UE assist with descent, 4/21/2021  Current: Progressing, Stairs x12 Steps - Reciprocal gait pattern with single UE assist with ascent/descent, 5/18/2021       PLAN  [x]  Upgrade activities as tolerated     [x]  Continue plan of care  []  Update interventions per flow sheet       []  Discharge due to:_  []  Other:_      Jennifer Mac, PT 5/20/2021  9:51 AM    Future Appointments   Date Time Provider Amalia Concepcion   6/2/2021  3:30 PM Selvin Guaman, PT [facility]

## 2021-05-26 ENCOUNTER — APPOINTMENT (OUTPATIENT)
Dept: PHYSICAL THERAPY | Age: 81
End: 2021-05-26
Payer: MEDICARE

## 2021-06-02 ENCOUNTER — HOSPITAL ENCOUNTER (OUTPATIENT)
Dept: PHYSICAL THERAPY | Age: 81
Discharge: HOME | End: 2021-06-02
Payer: MEDICARE

## 2021-06-02 PROCEDURE — 97112 NEUROMUSCULAR REEDUCATION: CPT

## 2021-06-02 PROCEDURE — 97110 THERAPEUTIC EXERCISES: CPT

## 2021-06-02 NOTE — PROGRESS NOTES
[facility] - [facility]              [address]        [place], [address]   [phone] fax    Continued Plan of Care/ Re-certification for Physical Therapy Services    Patient name: Fritz Torres Start of Care: 2021   Referral source: Miguel Perry MD : 1940   Medical/Treatment Diagnosis: Right hip pain [M25.551]  Low back pain [M54.5]  Payor: Nereyda Holloway / Plan: [place] & B / Product Type: Medicare /  Onset Date:Chronic, Worsening 2021     Prior Hospitalization: see medical history Provider#: [id number]   Medications: Verified on Patient Summary List    Comorbidities: Former Smoker, Arthritis, HTN, Anxiety, Carotid Artery Stenosis, Pre-Diabetic, Osteoporosis   Prior Level of Function: Fall History, (I) Self-Care ADLs, (I) Functional ADLs, Ambulation without AD, (I) Driving  Visits from Start of Care: 13    Missed Visits: 1    The Plan of Care and following information is based on the patient's current status:    Short Term Goals: To be accomplished in 2 weeks:  1. Patient will subjectively report full compliance with prescribed HEP. At Last PN: Met, HEP performance reported as prescribed  2. Patient will demonstrate supine right hip flexion MMT 5/5 (non-fatigable) to improve ease with curb management. At Last PN: Remains, Supine Right Hip Flexion MMT = 4+/5 (non-fatigable)  Current: Met, Supine Right Hip Flexion MMT = 5/5 (non-fatigable)  3. Patient will demonstrate left/right hip abduction MMT >/= 4/5 to improve safety with ambulation on uneven surfaces. At Last PN: Progressing, Left Hip Abduction MMT 3+/5, Right Hip Abduction MMT 3/5  Current: Remains, Left Hip Abduction MMT 3+/5, Right Hip Abduction MMT 3/5     Long Term Goals: To be accomplished in 4 weeks:  1. Patient will demonstrate a significant functional improvement as demonstrated by a score of >/= 50 on FOTO.   At Last PN: Remains, FOTO = 43  Current: Progressing, FOTO = 44  2. Patient will demonstrate left/right SLS >/= 15 seconds to demonstrate a reduced falls risk. At Last PN: Progressing, Left SLS 4 sec / Right SLS < 2 sec  Current: Progressing, Left SLS 8 sec / Right SLS 6 sec  3. Patient will demonstrate ability to ascend/descend 12 stairs with a reciprocal gait pattern and single UE assist independently to improve safety with community ADLs. At Last PN: Progressing, Stairs x4 Steps - Reciprocal gait pattern with bilateral UE assist with ascent with left lateral weight shift with step-to gait pattern with bilateral UE assist with descent  Current: Met, Stairs x12 Steps - Reciprocal gait pattern with single UE assist with ascent/descent    Key functional changes: See goals above. Problems/ barriers to goal attainment: Relative plateau in progression having been demonstrated without significant objective improvement in low back pain since start of care. Problem List: pain affecting function, decrease ROM, decrease strength, impaired gait/ balance, decrease ADL/ functional abilitiies, decrease activity tolerance, decrease flexibility/ joint mobility and decrease transfer abilities    Treatment Plan: Therapeutic exercise, Therapeutic activities, Neuromuscular re-education, Physical agent/modality, Gait/balance training, Manual therapy, Patient education, Self Care training, Functional mobility training, Home safety training and Stair training     Patient Goal (s) has been updated and includes: \"Feel more safe with walking. \"     Goals for this certification period to be accomplished in 4 weeks: To continue with unmet goals above if determination made to continue with skilled PT services. Frequency / Duration: Patient to be placed on 30 day hold with referral back to physician secondary to relative plateau having been demonstrated.  If determination made to continue with skilled PT services, patient to be seen 2 times per week for 4 weeks:    Assessment / Recommendations: With overall objective improvement in available right proximal hip strength but with continued fatigable weakness demonstrated within session with repetition of exercise with trendelenberg gait pattern with ambulation without AD. Significant observational improvement in ease and safety with stair management with a reciprocal gait pattern independently but with continued requirement to utilize [facility] with community-distance ambulation secondary to falls risk. Despite significant objective improvement in proximal right hip strength with continued subjective complaint of lower back pain without significant improvement since start of care with symptoms continuing to be primarily activity-dependent. At this time patient to be placed on 30 day hold with referral back to physician secondary to relative objective and subjective plateau having been demonstrated.      Patient will continue to benefit from skilled PT services to modify and progress therapeutic interventions, address functional mobility deficits, address ROM deficits, address strength deficits, analyze and address soft tissue restrictions, analyze and cue movement patterns, analyze and modify body mechanics/ergonomics, assess and modify postural abnormalities, address imbalance/dizziness and instruct in home and community integration to attain remaining goals. Certification Period: 6/7/2021 - 7/6/2021    Bernerd Hammans, PT 6/2/2021 8:41 AM    ________________________________________________________________________  I certify that the above Therapy Services are being furnished while the patient is under my care. I agree with the treatment plan and certify that this therapy is necessary. [] I have read the above and request that my patient continue as recommended.   [] I have read the above report and request that my patient continue therapy with the following changes/special instructions: _______________________________________  [] I have read the above report and request that my patient be discharged from therapy    Physician's Signature:____________Date:_________TIME:________     Deandre Camejo MD  ** Signature, Date and Time must be completed for valid certification **  Please sign and return to [facility] - [address], [address] [place]   [phone] fax

## 2021-06-02 NOTE — PROGRESS NOTES
PT DAILY TREATMENT NOTE     Patient Name: Boogie Steiner  Date:2021  : 1940  [x]  Patient  Verified  [place] / Plan: [place] & B / Product Type: Medicare /    In time:332  Out time:413  Total Treatment Time (min): 41  Visit #: 6 of 8    Medicare/BCBS Only   Total Timed Codes (min):  31 1:1 Treatment Time:  31       Treatment Area: Right hip pain [M25.551]  Low back pain [M54.5]    SUBJECTIVE  Pain Level (0-10 scale): 0  Any medication changes, allergies to medications, adverse drug reactions, diagnosis change, or new procedure performed?: [x] No    [] Yes (see summary sheet for update)  Subjective functional status/changes:   [] No changes reported  Patient subjectively reports agreement with placement on 30 day hold with patient scheduled to receive second opinion 2021.      OBJECTIVE             Modality rationale: decrease pain and increase tissue extensibility to improve the patients ability to improve ease with sleep   Min Type Additional Details     10 []???????????  Ice     [x]???????????  heat  []???????????  Ice massage Position: Supine  Location: Lumbar, Post-tx        20 min Therapeutic Exercise:  [x]??????????? See flow sheet : Emphasis placed on improving available lumbopelvic and hip AROM and strength   Rationale: increase ROM and increase strength to improve the patients ability to improve ease with functional ADLs     11 min Neuromuscular Re-education:  [x]???????????  See flow sheet : Emphasis placed on improving activation and recruitment of the anterior abdominal and gluteal musculature and improving LE proprioceptive and kinesthetic awareness   Rationale: increase ROM, increase strength, improve balance and increase proprioception  to improve the patients ability to decrease falls risk.         With   [] TE   [] TA   [] neuro   [] other: Patient Education: [x] Review HEP    [] Progressed/Changed HEP based on:   [] positioning   [] body mechanics [] transfers   [] heat/ice application    [] other:      Other Objective/Functional Measures: See goals below. **Modification of exercises required secondary to right foot pain s/p injection 5/25/2021    Pain Level (0-10 scale) post treatment: 0    ASSESSMENT/Changes in Function: With overall objective improvement in available right proximal hip strength but with continued fatigable weakness demonstrated within session with repetition of exercise with trendelenberg gait pattern with ambulation without AD. Significant observational improvement in ease and safety with stair management with a reciprocal gait pattern independently but with continued requirement to utilize [facility] with community-distance ambulation secondary to falls risk. Despite significant objective improvement in proximal right hip strength with continued subjective complaint of lower back pain without significant improvement since start of care with symptoms continuing to be primarily activity-dependent. At this time patient to be placed on 30 day hold with referral back to physician secondary to relative objective and subjective plateau having been demonstrated. Patient will continue to benefit from skilled PT services to modify and progress therapeutic interventions, address functional mobility deficits, address ROM deficits, address strength deficits, analyze and address soft tissue restrictions, analyze and cue movement patterns, analyze and modify body mechanics/ergonomics, assess and modify postural abnormalities, address imbalance/dizziness and instruct in home and community integration to attain remaining goals. []  See Plan of Care  [x]  See progress note/recertification  []  See Discharge Summary         Progress towards goals / Updated goals:    Short Term Goals: To be accomplished in 2 weeks:  1. Patient will subjectively report full compliance with prescribed HEP.   At Last PN: Met, HEP performance reported as prescribed, 4/14/2021  2. Patient will demonstrate supine right hip flexion MMT 5/5 (non-fatigable) to improve ease with curb management. At Last PN: Remains, Supine Right Hip Flexion MMT = 4+/5 (non-fatigable), 4/19/2021  Current: Met, Supine Right Hip Flexion MMT = 5/5 (non-fatigable), 5/5/2021  3. Patient will demonstrate left/right hip abduction MMT >/= 4/5 to improve safety with ambulation on uneven surfaces. At Last PN: Progressing, Left Hip Abduction MMT 3+/5, Right Hip Abduction MMT 3/5, 4/26/2021  Current: Remains, Left Hip Abduction MMT 3+/5, Right Hip Abduction MMT 3/5, 5/20/2021     Long Term Goals: To be accomplished in 4 weeks:  1. Patient will demonstrate a significant functional improvement as demonstrated by a score of >/= 50 on FOTO. At Last PN: Remains, FOTO = 43, 5/3/2021  Current: Progressing, FOTO = 44, 6/2/2021  2. Patient will demonstrate left/right SLS >/= 15 seconds to demonstrate a reduced falls risk. At Last PN: Progressing, Left SLS 4 sec / Right SLS < 2 sec, 4/19/2021  Current: Progressing, Left SLS 8 sec / Right SLS 6 sec, 5/14/2021  3. Patient will demonstrate ability to ascend/descend 12 stairs with a reciprocal gait pattern and single UE assist independently to improve safety with community ADLs.   At Last PN: Progressing, Stairs x4 Steps - Reciprocal gait pattern with bilateral UE assist with ascent with left lateral weight shift with step-to gait pattern with bilateral UE assist with descent, 4/21/2021  Current: Met, Stairs x12 Steps - Reciprocal gait pattern with single UE assist with ascent/descent, 5/18/2021    PLAN  []  Upgrade activities as tolerated     []  Continue plan of care  []  Update interventions per flow sheet       []  Discharge due to:_  [x]  Other:_ Hold x30 days with referral back to MD Jennifer Mac, PT 6/2/2021  8:40 AM    Future Appointments   Date Time Provider Amalia Concepcion   6/2/2021  3:30 PM Selvin Guaman, PT [facility]

## 2021-07-06 NOTE — PROGRESS NOTES
[facility] - [facility]              [address]        [place], [address] [place]   [phone] fax      Discharge Summary  Patient name: Sandy Wilcox Start of Care: 2021   Referral source: Lore Frederick MD : 1940   Medical/Treatment Diagnosis: Right hip pain [M25.551]  Low back pain [M54.5]  Payor: [employer] / Plan: [place] & B / Product Type: Medicare /  Onset Date:Chronic, Worsening 2021     Prior Hospitalization: see medical history Provider#: [id number]   Medications: Verified on Patient Summary List    Comorbidities: Former Smoker, Arthritis, HTN, Anxiety, Carotid Artery Stenosis, Pre-Diabetic, Osteoporosis   Prior Level of Function: Fall History, (I) Self-Care ADLs, (I) Functional ADLs, Ambulation without AD, (I) Driving  Visits from Start of Care: 13    Missed Visits: 2  Reporting Period : 5/3/2021 to 2021      Summary of Care:    Short Term Goals: To be accomplished in 2 weeks:  1. Patient will subjectively report full compliance with prescribed HEP. At Last PN: Met, HEP performance reported as prescribed  2. Patient will demonstrate supine right hip flexion MMT 5/5 (non-fatigable) to improve ease with curb management. At Last PN: Remains, Supine Right Hip Flexion MMT = 4+/5 (non-fatigable)  Current: Met, Supine Right Hip Flexion MMT = 5/5 (non-fatigable)  3. Patient will demonstrate left/right hip abduction MMT >/= 4/5 to improve safety with ambulation on uneven surfaces. At Last PN: Progressing, Left Hip Abduction MMT 3+/5, Right Hip Abduction MMT 3/5  Current: Remains, Left Hip Abduction MMT 3+/5, Right Hip Abduction MMT 3/5     Long Term Goals: To be accomplished in 4 weeks:  1. Patient will demonstrate a significant functional improvement as demonstrated by a score of >/= 50 on FOTO. At Last PN: Remains, FOTO = 43  Current: Progressing, FOTO = 44  2.  Patient will demonstrate left/right SLS >/= 15 seconds to demonstrate a reduced falls risk. At Last PN: Progressing, Left SLS 4 sec / Right SLS < 2 sec  Current: Progressing, Left SLS 8 sec / Right SLS 6 sec  3. Patient will demonstrate ability to ascend/descend 12 stairs with a reciprocal gait pattern and single UE assist independently to improve safety with community ADLs. At Last PN: Progressing, Stairs x4 Steps - Reciprocal gait pattern with bilateral UE assist with ascent with left lateral weight shift with step-to gait pattern with bilateral UE assist with descent  Current: Met, Stairs x12 Steps - Reciprocal gait pattern with single UE assist with ascent/descent    ASSESSMENT/RECOMMENDATIONS:    At this time patient to be discharged in accordance with clinic 30 day policy with patient having last been seen within clinic 6/2/2021. At time of last scheduled appointment with placement of patient on 30 day hold with referral back to physician secondary to relative objective and subjective plateau having been demonstrated. Patient without further contact with clinic over 30 day period.     [x]Discontinue therapy: []Patient has reached or is progressing toward set goals      [x]Patient discharged per 30 day hold policy secondary to relative plateau in progress      []Due to lack of appreciable progress towards set goals    Shaka Thomas, PT 7/6/2021 8:43 AM

## 2022-07-11 NOTE — TELEPHONE ENCOUNTER
Pt went to pharmacy to  her 2 RX'S and the Dicoflenac rx was not there. Pharmacy searched for it and they said they didn't have an order for that. Can you call pharmacy or re-send RX to them? Please call patient back at [phone] once you have an answer.
Spoke with pharmacist, they needed to know exactly how many grams to use, verbal order given to use 4 grams to affected area daily.  They will get it ready for her
asymptomatic